# Patient Record
Sex: FEMALE | Race: WHITE | NOT HISPANIC OR LATINO | Employment: FULL TIME | URBAN - METROPOLITAN AREA
[De-identification: names, ages, dates, MRNs, and addresses within clinical notes are randomized per-mention and may not be internally consistent; named-entity substitution may affect disease eponyms.]

---

## 2017-04-17 ENCOUNTER — OFFICE VISIT (OUTPATIENT)
Dept: FAMILY MEDICINE | Facility: CLINIC | Age: 27
End: 2017-04-17
Payer: COMMERCIAL

## 2017-04-17 VITALS
TEMPERATURE: 98.1 F | RESPIRATION RATE: 16 BRPM | SYSTOLIC BLOOD PRESSURE: 121 MMHG | BODY MASS INDEX: 27.49 KG/M2 | HEIGHT: 64 IN | DIASTOLIC BLOOD PRESSURE: 83 MMHG | HEART RATE: 86 BPM | WEIGHT: 161 LBS

## 2017-04-17 DIAGNOSIS — Z23 NEED FOR TDAP VACCINATION: ICD-10-CM

## 2017-04-17 DIAGNOSIS — F41.9 ANXIETY: ICD-10-CM

## 2017-04-17 DIAGNOSIS — Z00.00 ROUTINE GENERAL MEDICAL EXAMINATION AT A HEALTH CARE FACILITY: Primary | ICD-10-CM

## 2017-04-17 DIAGNOSIS — G43.109 MIGRAINE WITH AURA AND WITHOUT STATUS MIGRAINOSUS, NOT INTRACTABLE: ICD-10-CM

## 2017-04-17 DIAGNOSIS — F32.0 MAJOR DEPRESSIVE DISORDER, SINGLE EPISODE, MILD (H): ICD-10-CM

## 2017-04-17 LAB
ANION GAP SERPL CALCULATED.3IONS-SCNC: 7 MMOL/L (ref 3–14)
BASOPHILS # BLD AUTO: 0 10E9/L (ref 0–0.2)
BASOPHILS NFR BLD AUTO: 0.1 %
BUN SERPL-MCNC: 10 MG/DL (ref 7–30)
CALCIUM SERPL-MCNC: 9.3 MG/DL (ref 8.5–10.1)
CHLORIDE SERPL-SCNC: 107 MMOL/L (ref 94–109)
CO2 SERPL-SCNC: 27 MMOL/L (ref 20–32)
CREAT SERPL-MCNC: 0.77 MG/DL (ref 0.52–1.04)
DIFFERENTIAL METHOD BLD: NORMAL
EOSINOPHIL # BLD AUTO: 0.1 10E9/L (ref 0–0.7)
EOSINOPHIL NFR BLD AUTO: 1.1 %
ERYTHROCYTE [DISTWIDTH] IN BLOOD BY AUTOMATED COUNT: 12.8 % (ref 10–15)
GFR SERPL CREATININE-BSD FRML MDRD: 89 ML/MIN/1.7M2
GLUCOSE SERPL-MCNC: 96 MG/DL (ref 70–99)
HCT VFR BLD AUTO: 39.2 % (ref 35–47)
HGB BLD-MCNC: 13.9 G/DL (ref 11.7–15.7)
LYMPHOCYTES # BLD AUTO: 1.7 10E9/L (ref 0.8–5.3)
LYMPHOCYTES NFR BLD AUTO: 20.7 %
MCH RBC QN AUTO: 30.8 PG (ref 26.5–33)
MCHC RBC AUTO-ENTMCNC: 35.5 G/DL (ref 31.5–36.5)
MCV RBC AUTO: 87 FL (ref 78–100)
MONOCYTES # BLD AUTO: 0.6 10E9/L (ref 0–1.3)
MONOCYTES NFR BLD AUTO: 6.6 %
NEUTROPHILS # BLD AUTO: 6 10E9/L (ref 1.6–8.3)
NEUTROPHILS NFR BLD AUTO: 71.5 %
PLATELET # BLD AUTO: 162 10E9/L (ref 150–450)
POTASSIUM SERPL-SCNC: 3.7 MMOL/L (ref 3.4–5.3)
RBC # BLD AUTO: 4.52 10E12/L (ref 3.8–5.2)
SODIUM SERPL-SCNC: 141 MMOL/L (ref 133–144)
WBC # BLD AUTO: 8.3 10E9/L (ref 4–11)

## 2017-04-17 PROCEDURE — 80048 BASIC METABOLIC PNL TOTAL CA: CPT | Performed by: FAMILY MEDICINE

## 2017-04-17 PROCEDURE — 90715 TDAP VACCINE 7 YRS/> IM: CPT | Performed by: FAMILY MEDICINE

## 2017-04-17 PROCEDURE — 99213 OFFICE O/P EST LOW 20 MIN: CPT | Mod: 25 | Performed by: FAMILY MEDICINE

## 2017-04-17 PROCEDURE — 36415 COLL VENOUS BLD VENIPUNCTURE: CPT | Performed by: FAMILY MEDICINE

## 2017-04-17 PROCEDURE — 90471 IMMUNIZATION ADMIN: CPT | Performed by: FAMILY MEDICINE

## 2017-04-17 PROCEDURE — 85025 COMPLETE CBC W/AUTO DIFF WBC: CPT | Performed by: FAMILY MEDICINE

## 2017-04-17 PROCEDURE — 99385 PREV VISIT NEW AGE 18-39: CPT | Mod: 25 | Performed by: FAMILY MEDICINE

## 2017-04-17 RX ORDER — SUMATRIPTAN 25 MG/1
25-50 TABLET, FILM COATED ORAL
Qty: 18 TABLET | Refills: 3 | Status: SHIPPED | OUTPATIENT
Start: 2017-04-17 | End: 2019-05-31

## 2017-04-17 RX ORDER — CITALOPRAM HYDROBROMIDE 20 MG/1
TABLET ORAL
Qty: 30 TABLET | Refills: 0 | Status: SHIPPED | OUTPATIENT
Start: 2017-04-17 | End: 2017-05-19

## 2017-04-17 ASSESSMENT — ANXIETY QUESTIONNAIRES
2. NOT BEING ABLE TO STOP OR CONTROL WORRYING: SEVERAL DAYS
7. FEELING AFRAID AS IF SOMETHING AWFUL MIGHT HAPPEN: SEVERAL DAYS
3. WORRYING TOO MUCH ABOUT DIFFERENT THINGS: SEVERAL DAYS
IF YOU CHECKED OFF ANY PROBLEMS ON THIS QUESTIONNAIRE, HOW DIFFICULT HAVE THESE PROBLEMS MADE IT FOR YOU TO DO YOUR WORK, TAKE CARE OF THINGS AT HOME, OR GET ALONG WITH OTHER PEOPLE: SOMEWHAT DIFFICULT
5. BEING SO RESTLESS THAT IT IS HARD TO SIT STILL: SEVERAL DAYS
GAD7 TOTAL SCORE: 9
6. BECOMING EASILY ANNOYED OR IRRITABLE: MORE THAN HALF THE DAYS
1. FEELING NERVOUS, ANXIOUS, OR ON EDGE: MORE THAN HALF THE DAYS

## 2017-04-17 ASSESSMENT — PATIENT HEALTH QUESTIONNAIRE - PHQ9: 5. POOR APPETITE OR OVEREATING: SEVERAL DAYS

## 2017-04-17 NOTE — PATIENT INSTRUCTIONS
Follow up in 1 month or sooner if needed    Preventive Health Recommendations  Female Ages 26 - 39  Yearly exam:   See your health care provider every year in order to    Review health changes.     Discuss preventive care.      Review your medicines if you your doctor has prescribed any.    Until age 30: Get a Pap test every three years (more often if you have had an abnormal result).    After age 30: Talk to your doctor about whether you should have a Pap test every 3 years or have a Pap test with HPV screening every 5 years.   You do not need a Pap test if your uterus was removed (hysterectomy) and you have not had cancer.  You should be tested each year for STDs (sexually transmitted diseases), if you're at risk.   Talk to your provider about how often to have your cholesterol checked.  If you are at risk for diabetes, you should have a diabetes test (fasting glucose).  Shots: Get a flu shot each year. Get a tetanus shot every 10 years.   Nutrition:     Eat at least 5 servings of fruits and vegetables each day.    Eat whole-grain bread, whole-wheat pasta and brown rice instead of white grains and rice.    Talk to your provider about Calcium and Vitamin D.     Lifestyle    Exercise at least 150 minutes a week (30 minutes a day, 5 days of the week). This will help you control your weight and prevent disease.    Limit alcohol to one drink per day.    No smoking.     Wear sunscreen to prevent skin cancer.    See your dentist every six months for an exam and cleaning.

## 2017-04-17 NOTE — NURSING NOTE
"Chief Complaint   Patient presents with     Physical     PE/PAP--pt is fasting this AM       Initial /83 (BP Location: Right arm, Patient Position: Chair, Cuff Size: Adult Regular)  Pulse 86  Temp 98.1  F (36.7  C) (Oral)  Resp 16  Ht 5' 4\" (1.626 m)  Wt 161 lb (73 kg)  LMP 04/05/2017  Breastfeeding? No  BMI 27.64 kg/m2 Estimated body mass index is 27.64 kg/(m^2) as calculated from the following:    Height as of this encounter: 5' 4\" (1.626 m).    Weight as of this encounter: 161 lb (73 kg).  Medication Reconciliation: complete     Ginna Polo/JUAN CARLOS  [place]---[facility]        "

## 2017-04-17 NOTE — LETTER
My Migraine Action Plan      Date: 4/17/2017     My Name: Christina Ornelas   YOB: 1990  My Pharmacy:    [facility] [address] [place] [facility] [address] [facility] [address] [place] [facility] [address]  [facility] [address] [place], [place] - [address]       My (Preventative) Control Medicine: N/A        My Rescue Medicine: imitrex   My Doctor: Jeannette Reddy     My Clinic: [address] [address] [phone]        GREEN ZONE = Good Control    My headache plan is working.   I can do what I need to do.           I WILL:     ? Keep managing my triggers.  ? Write in my migraine diary each time I have a headache.  ? Keep taking my preventive (controller) medicine daily.  ? Take my relief and rescue medicine as needed.             YELLOW ZONE = Not Enough Control    My headache plan isn t always working.   My headaches keep me from doing   some of the things I need to do.       I WILL:     ? Set goals to control my triggers and act on them.  ? Write in my migraine diary each time I have a headache and review it for                      patterns or new triggers.  ? Keep taking my preventive (controller) medicine daily.  ? Take my relief and rescue medicine as needed.  ? Call my doctor or clinic at if I stay in the Yellow Zone.             RED ZONE = Poor or No Control    My headache plan has  failed. I can t do anything  when I have one. My  medicines aren t working.           I WILL:   ? Set goals to control my triggers and act on them.  ? Write in my migraine diary each time I have a headache and review it for                      patterns or new triggers.  ? Keep taking my preventive (controller) medicine daily.  ? Take my relief and rescue medicine as needed.  ? Call my doctor or clinic or go to urgent care or an ER if I m having the worst                  headache of my  life.  ? Call my doctor or clinic or go to urgent care or an ER if my medicine doesn t work.  ? Let my doctor or clinic know within 2 weeks if I have gone to an urgent care or             emergency department.          Provider specific instructions:  ***

## 2017-04-17 NOTE — PROGRESS NOTES
SUBJECTIVE:     CC: Christina Ornelas is an 26 year old woman who presents for preventive health visit.     Healthy Habits:    Do you get at least three servings of calcium containing foods daily (dairy, green leafy vegetables, etc.)? yes    Amount of exercise or daily activities, outside of work: 0-1 day(s) per week    Problems taking medications regularly not applicable    Medication side effects: No    Have you had an eye exam in the past two years? yes    Do you see a dentist twice per year? yes    Do you have sleep apnea, excessive snoring or daytime drowsiness?no    Other:  IUD- 1 month ago.   Last pap- sometime last year and it was [facility].     Anxiety/depression  Per patient, used to take citalopram and lorazepam to help with her mood.   Has not needed it in a few years but feels like she needs to get back on it.   Currently helping take care of her boyfriend's kids and states that along with work is making her more anxious.   Has a history of ADHD and used to be on Adderall - last filled in 2014 and states she would like to get back on it as sometimes her boss wants her to get projects done very quickly.          Today's PHQ-2 Score:   PHQ-2 ( 1999 Pfizer) 3/3/2014 12/31/2012   Q1: Little interest or pleasure in doing things 0 0   Q2: Feeling down, depressed or hopeless 0 0   PHQ-2 Score 0 0       Abuse: Current or Past(Physical, Sexual or Emotional)- No  Do you feel safe in your environment - Yes    Social History   Substance Use Topics     Smoking status: Former Smoker     Types: Cigarettes     Quit date: 6/1/2011     Smokeless tobacco: Never Used     Alcohol use No      Comment: once A WEEK, 2-3 beers at times      The patient does not drink >3 drinks per day nor >7 drinks per week.    Recent Labs   Lab Test  11/14/11   1211  11/23/09   1204   CHOL  189  166   HDL  63  57   LDL  99  92   TRIG  136  84   CHOLHDLRATIO  3.0  2.9       Reviewed orders with patient.  Reviewed health  "maintenance and updated orders accordingly - Yes    Mammo Decision Support:  Mammogram not appropriate for this patient based on age.    Pertinent mammograms are reviewed under the imaging tab.  History of abnormal Pap smear: NO - age 21-29 PAP every 3 years recommended    Reviewed and updated as needed this visit by clinical staff  Tobacco  Allergies  Med Hx  Surg Hx  Fam Hx  Soc Hx        Reviewed and updated as needed this visit by Provider            ROS:  C: NEGATIVE for fever, chills, change in weight  I: NEGATIVE for worrisome rashes, moles or lesions  E: NEGATIVE for vision changes or irritation  ENT: NEGATIVE for ear, mouth and throat problems  R: NEGATIVE for significant cough or SOB  B: NEGATIVE for masses, tenderness or discharge  CV: NEGATIVE for chest pain, palpitations or peripheral edema  GI: NEGATIVE for nausea, abdominal pain, heartburn, or change in bowel habits  : NEGATIVE for unusual urinary or vaginal symptoms. Periods are regular.  M: NEGATIVE for significant arthralgias or myalgia  N: NEGATIVE for weakness, dizziness or paresthesias  PSYCHIATRIC: POSITIVE forHx anxiety and Hx depression    Problem list, Medication list, Allergies, and Medical/Social/Surgical histories reviewed in [facility] and updated as appropriate.  OBJECTIVE:     /83 (BP Location: Right arm, Patient Position: Chair, Cuff Size: Adult Regular)  Pulse 86  Temp 98.1  F (36.7  C) (Oral)  Resp 16  Ht 5' 4\" (1.626 m)  Wt 161 lb (73 kg)  LMP 04/05/2017  Breastfeeding? No  BMI 27.64 kg/m2  EXAM:  GENERAL: healthy, alert and no distress  EYES: Eyes grossly normal to inspection, PERRL and conjunctivae and sclerae normal  HENT: ear canals and TM's normal, nose and mouth without ulcers or lesions  RESP: lungs clear to auscultation - no rales, rhonchi or wheezes  CV: regular rate and rhythm, normal S1 S2, no S3 or S4, no murmur, click or rub, no peripheral edema and peripheral pulses strong  ABDOMEN: soft, nontender, no " "hepatosplenomegaly, no masses and bowel sounds normal   (female): deferred  MS: no gross musculoskeletal defects noted, no edema  NEURO: Normal strength and tone, mentation intact and speech normal  PSYCH: mentation appears normal, affect normal/bright    ASSESSMENT/PLAN:     1. Routine general medical examination at a health care facility  - up to date on pap.   - CBC with platelets differential  - Basic metabolic panel    2. Major depressive disorder, single episode, mild (H)  - will restart on celexa. Will start on 10 mg and titrate up. Patient to follow up in 1 month or sooner if needed. Discussed CBT but patient not ready for that at this time.   - citalopram (CELEXA) 20 MG tablet; Take 1/2 tablet (10 mg) for 1-2 weeks, then increase to 1 tablet orally daily  Dispense: 30 tablet; Refill: 0    3. Anxiety  - see above   - citalopram (CELEXA) 20 MG tablet; Take 1/2 tablet (10 mg) for 1-2 weeks, then increase to 1 tablet orally daily  Dispense: 30 tablet; Refill: 0    4. Migraine with aura and without status migrainosus, not intractable  - will refill imitrex   - SUMAtriptan (IMITREX) 25 MG tablet; Take 1-2 tablets (25-50 mg) by mouth at onset of headache for migraine May repeat dose in 2 hours.  Do not exceed 200 mg in 24 hours  Dispense: 18 tablet; Refill: 3    5. Need for Tdap vaccination  - TDAP VACCINE (ADACEL)    COUNSELING:   Reviewed preventive health counseling, as reflected in patient instructions       Regular exercise       Healthy diet/nutrition         reports that she quit smoking about 5 years ago. Her smoking use included Cigarettes. She has never used smokeless tobacco.    Estimated body mass index is 27.64 kg/(m^2) as calculated from the following:    Height as of this encounter: 5' 4\" (1.626 m).    Weight as of this encounter: 161 lb (73 kg).   Weight management plan: Discussed healthy diet and exercise guidelines and patient will follow up in 12 months in clinic to re-evaluate.    Counseling " Resources:  ATP IV Guidelines  Pooled Cohorts Equation Calculator  Breast Cancer Risk Calculator  FRAX Risk Assessment  ICSI Preventive Guidelines  Dietary Guidelines for Americans, 2010  USDA's MyPlate  ASA Prophylaxis  Lung CA Screening    Sheila Ruff MD  [facility]

## 2017-04-17 NOTE — MR AVS SNAPSHOT
After Visit Summary   4/17/2017    Christina Ornelas    MRN: [id number]           Patient Information     Date Of Birth          1990        Visit Information        Provider Department      4/17/2017 8:30 AM Sheila Ruff MD [facility]        Today's Diagnoses     Major depressive disorder, single episode, mild (H)    -  1    Routine general medical examination at a health care facility        Anxiety        Migraine with aura and without status migrainosus, not intractable          Care Instructions      Follow up in 1 month or sooner if needed    Preventive Health Recommendations  Female Ages 26 - 39  Yearly exam:   See your health care provider every year in order to    Review health changes.     Discuss preventive care.      Review your medicines if you your doctor has prescribed any.    Until age 30: Get a Pap test every three years (more often if you have had an abnormal result).    After age 30: Talk to your doctor about whether you should have a Pap test every 3 years or have a Pap test with HPV screening every 5 years.   You do not need a Pap test if your uterus was removed (hysterectomy) and you have not had cancer.  You should be tested each year for STDs (sexually transmitted diseases), if you're at risk.   Talk to your provider about how often to have your cholesterol checked.  If you are at risk for diabetes, you should have a diabetes test (fasting glucose).  Shots: Get a flu shot each year. Get a tetanus shot every 10 years.   Nutrition:     Eat at least 5 servings of fruits and vegetables each day.    Eat whole-grain bread, whole-wheat pasta and brown rice instead of white grains and rice.    Talk to your provider about Calcium and Vitamin D.     Lifestyle    Exercise at least 150 minutes a week (30 minutes a day, 5 days of the week). This will help you control your weight and prevent disease.    Limit alcohol to one drink per day.    No  "smoking.     Wear sunscreen to prevent skin cancer.    See your dentist every six months for an exam and cleaning.          Follow-ups after your visit        Who to contact     If you have questions or need follow up information about today's clinic visit or your schedule please contact [facility] directly at [phone].  Normal or non-critical lab and imaging results will be communicated to you by MyChart, letter or phone within 4 business days after the clinic has received the results. If you do not hear from us within 7 days, please contact the clinic through [employer] or phone. If you have a critical or abnormal lab result, we will notify you by phone as soon as possible.  Submit refill requests through [email or web address] or call your pharmacy and they will forward the refill request to us. Please allow 3 business days for your refill to be completed.          Additional Information About Your Visit        MyChart Information     [email or web address] lets you send messages to your doctor, view your test results, renew your prescriptions, schedule appointments and more. To sign up, go to [email or web address] . Click on \"Log in\" on the left side of the screen, which will take you to the Welcome page. Then click on \"Sign up Now\" on the right side of the page.     You will be asked to enter the access code listed below, as well as some personal information. Please follow the directions to create your username and password.     Your access code is: [email or web address]  Expires: 2017  9:08 AM     Your access code will  in 90 days. If you need help or a new code, please call your [facility] or [phone].        Care EveryWhere ID     This is your Care EveryWhere ID. This could be used by other organizations to access your [facility] medical records  [id number]        Your Vitals Were     Pulse Temperature Respirations Height Last Period Breastfeeding?    86 98.1  F (36.7  C) (Oral) 16 5' 4\" (1.626 m) " 04/05/2017 No    BMI (Body Mass Index)                   27.64 kg/m2            Blood Pressure from Last 3 Encounters:   04/17/17 121/83   03/03/14 108/70   03/16/13 120/80    Weight from Last 3 Encounters:   04/17/17 161 lb (73 kg)   03/03/14 152 lb (68.9 kg)   03/16/13 163 lb (73.9 kg)              We Performed the Following     Basic metabolic panel     CBC with platelets differential          Today's Medication Changes          These changes are accurate as of: 4/17/17  9:08 AM.  If you have any questions, ask your nurse or doctor.               Start taking these medicines.        Dose/Directions    citalopram 20 MG tablet   Commonly known as:  celeXA   Used for:  Major depressive disorder, single episode, mild (H), Anxiety   Started by:  Sheila Ruff MD        Take 1/2 tablet (10 mg) for 1-2 weeks, then increase to 1 tablet orally daily   Quantity:  30 tablet   Refills:  0            Where to get your medicines      These medications were sent to [facility] [address] [place] [address] 42  AT [address] [facility] [address]     Phone:  [phone]     citalopram 20 MG tablet    SUMAtriptan 25 MG tablet                Primary Care Provider Office Phone # Fax #    Jeannette Reddy [phone]       [address] [address]        Thank you!     Thank you for choosing [facility]  for your care. Our goal is always to provide you with excellent care. Hearing back from our patients is one way we can continue to improve our services. Please take a few minutes to complete the written survey that you may receive in the mail after your visit with us. Thank you!             Your Updated Medication List - Protect others around you: Learn how to safely use, store and throw away your medicines at www.disposemymeds.org.          This list is accurate as of: 4/17/17  9:08 AM.  Always  use your most recent med list.                   Brand Name Dispense Instructions for use    amphetamine-dextroamphetamine 20 MG per tablet    ADDERALL    31 tablet    Take 1 tablet (20 mg) by mouth daily (with breakfast)       citalopram 20 MG tablet    celeXA    30 tablet    Take 1/2 tablet (10 mg) for 1-2 weeks, then increase to 1 tablet orally daily       levonorgestrel 20 MCG/24HR IUD    MIRENA    1 each    1 each (20 mcg) by Intrauterine route once for 1 dose       SUMAtriptan 25 MG tablet    IMITREX    18 tablet    Take 1-2 tablets (25-50 mg) by mouth at onset of headache for migraine May repeat dose in 2 hours.  Do not exceed 200 mg in 24 hours

## 2017-04-17 NOTE — LETTER
[facility]  [address] [place], [address]  [phone]      April 18, 2017    Christina Ornelas  [address] [facility] [address] [address]          Dear Christina,    The results of your recent tests were normal.  Enclosed is a copy of the results.  It was a pleasure to see you at your last appointment.  Results for orders placed or performed in visit on 04/17/17   CBC with platelets differential   Result Value Ref Range    WBC 8.3 4.0 - 11.0 10e9/L    RBC Count 4.52 3.8 - 5.2 10e12/L    Hemoglobin 13.9 11.7 - 15.7 g/dL    Hematocrit 39.2 35.0 - 47.0 %    MCV 87 78 - 100 fl    MCH 30.8 26.5 - 33.0 pg    MCHC 35.5 31.5 - 36.5 g/dL    RDW 12.8 10.0 - 15.0 %    Platelet Count 162 150 - 450 10e9/L    Diff Method Automated Method     % Neutrophils 71.5 %    % Lymphocytes 20.7 %    % Monocytes 6.6 %    % Eosinophils 1.1 %    % Basophils 0.1 %    Absolute Neutrophil 6.0 1.6 - 8.3 10e9/L    Absolute Lymphocytes 1.7 0.8 - 5.3 10e9/L    Absolute Monocytes 0.6 0.0 - 1.3 10e9/L    Absolute Eosinophils 0.1 0.0 - 0.7 10e9/L    Absolute Basophils 0.0 0.0 - 0.2 10e9/L   Basic metabolic panel   Result Value Ref Range    Sodium 141 133 - 144 mmol/L    Potassium 3.7 3.4 - 5.3 mmol/L    Chloride 107 94 - 109 mmol/L    Carbon Dioxide 27 20 - 32 mmol/L    Anion Gap 7 3 - 14 mmol/L    Glucose 96 70 - 99 mg/dL    Urea Nitrogen 10 7 - 30 mg/dL    Creatinine 0.77 0.52 - 1.04 mg/dL    GFR Estimate 89 >60 mL/min/1.7m2    GFR Estimate If Black >90   GFR Calc   >60 mL/min/1.7m2    Calcium 9.3 8.5 - 10.1 mg/dL       If you have any questions or concerns, please call myself or my nurse at [phone].          Sincerely,    Sheila Ruff MD  [id number]

## 2017-04-17 NOTE — Clinical Note
Please abstract the following data from this visit with this patient into the appropriate field in Epic:  Pap smear done on this date: 2016 (approximately), by this group: [facility], results were wnl.

## 2017-04-18 ASSESSMENT — PATIENT HEALTH QUESTIONNAIRE - PHQ9: SUM OF ALL RESPONSES TO PHQ QUESTIONS 1-9: 3

## 2017-04-18 ASSESSMENT — ANXIETY QUESTIONNAIRES: GAD7 TOTAL SCORE: 9

## 2017-04-18 NOTE — PROGRESS NOTES
Please send patient a letter to let them know results are normal.    Labs from your recent visit are all normal.   For further questions or concerns please let us know.     Sincerely,    Dr. Driver

## 2017-05-19 ENCOUNTER — OFFICE VISIT (OUTPATIENT)
Dept: FAMILY MEDICINE | Facility: CLINIC | Age: 27
End: 2017-05-19
Payer: COMMERCIAL

## 2017-05-19 VITALS
DIASTOLIC BLOOD PRESSURE: 74 MMHG | SYSTOLIC BLOOD PRESSURE: 122 MMHG | HEIGHT: 64 IN | RESPIRATION RATE: 20 BRPM | HEART RATE: 72 BPM | TEMPERATURE: 98.3 F | WEIGHT: 160 LBS | BODY MASS INDEX: 27.31 KG/M2

## 2017-05-19 DIAGNOSIS — F41.9 ANXIETY: ICD-10-CM

## 2017-05-19 DIAGNOSIS — F32.0 MAJOR DEPRESSIVE DISORDER, SINGLE EPISODE, MILD (H): ICD-10-CM

## 2017-05-19 PROCEDURE — 99213 OFFICE O/P EST LOW 20 MIN: CPT | Performed by: FAMILY MEDICINE

## 2017-05-19 RX ORDER — CITALOPRAM HYDROBROMIDE 20 MG/1
TABLET ORAL
Qty: 30 TABLET | Refills: 3 | Status: SHIPPED | OUTPATIENT
Start: 2017-05-19 | End: 2018-05-11

## 2017-05-19 ASSESSMENT — ANXIETY QUESTIONNAIRES
GAD7 TOTAL SCORE: 3
1. FEELING NERVOUS, ANXIOUS, OR ON EDGE: SEVERAL DAYS
2. NOT BEING ABLE TO STOP OR CONTROL WORRYING: NOT AT ALL
5. BEING SO RESTLESS THAT IT IS HARD TO SIT STILL: NOT AT ALL
IF YOU CHECKED OFF ANY PROBLEMS ON THIS QUESTIONNAIRE, HOW DIFFICULT HAVE THESE PROBLEMS MADE IT FOR YOU TO DO YOUR WORK, TAKE CARE OF THINGS AT HOME, OR GET ALONG WITH OTHER PEOPLE: SOMEWHAT DIFFICULT
7. FEELING AFRAID AS IF SOMETHING AWFUL MIGHT HAPPEN: NOT AT ALL
6. BECOMING EASILY ANNOYED OR IRRITABLE: SEVERAL DAYS
3. WORRYING TOO MUCH ABOUT DIFFERENT THINGS: SEVERAL DAYS

## 2017-05-19 ASSESSMENT — PATIENT HEALTH QUESTIONNAIRE - PHQ9: 5. POOR APPETITE OR OVEREATING: NOT AT ALL

## 2017-05-19 NOTE — NURSING NOTE
"Chief Complaint   Patient presents with     Depression     f/u meds, depression/anxiety       Initial /74 (BP Location: Right arm, Patient Position: Chair, Cuff Size: Adult Regular)  Pulse 72  Temp 98.3  F (36.8  C) (Oral)  Resp 20  Ht 5' 4\" (1.626 m)  Wt 160 lb (72.6 kg)  Breastfeeding? No  BMI 27.46 kg/m2 Estimated body mass index is 27.46 kg/(m^2) as calculated from the following:    Height as of this encounter: 5' 4\" (1.626 m).    Weight as of this encounter: 160 lb (72.6 kg).  Medication Reconciliation: complete     Ginna Polo/JUAN CARLOS  [place]---[facility]      "

## 2017-05-19 NOTE — MR AVS SNAPSHOT
"              After Visit Summary   2017    Christina Ornelas    MRN: [id number]           Patient Information     Date Of Birth          1990        Visit Information        Provider Department      2017 10:00 AM Sheila Ruff MD [facility]        Today's Diagnoses     Major depressive disorder, single episode, mild (H)        Anxiety          Care Instructions    Follow up in 3 months or sooner if needed        Follow-ups after your visit        Who to contact     If you have questions or need follow up information about today's clinic visit or your schedule please contact [facility] directly at [phone].  Normal or non-critical lab and imaging results will be communicated to you by MyChart, letter or phone within 4 business days after the clinic has received the results. If you do not hear from us within 7 days, please contact the clinic through MyChart or phone. If you have a critical or abnormal lab result, we will notify you by phone as soon as possible.  Submit refill requests through [employer] or call your pharmacy and they will forward the refill request to us. Please allow 3 business days for your refill to be completed.          Additional Information About Your Visit        MyChart Information     [employer] lets you send messages to your doctor, view your test results, renew your prescriptions, schedule appointments and more. To sign up, go to [email or web address] . Click on \"Log in\" on the left side of the screen, which will take you to the Welcome page. Then click on \"Sign up Now\" on the right side of the page.     You will be asked to enter the access code listed below, as well as some personal information. Please follow the directions to create your username and password.     Your access code is: [email or web address]  Expires: 2017  9:08 AM     Your access code will  in 90 days. If you need help or a new code, please call " "your [facility] or [phone].        Care EveryWhere ID     This is your Care EveryWhere ID. This could be used by other organizations to access your [place] medical records  [id number]        Your Vitals Were     Pulse Temperature Respirations Height Breastfeeding? BMI (Body Mass Index)    72 98.3  F (36.8  C) (Oral) 20 5' 4\" (1.626 m) No 27.46 kg/m2       Blood Pressure from Last 3 Encounters:   05/19/17 122/74   04/17/17 121/83   03/03/14 108/70    Weight from Last 3 Encounters:   05/19/17 160 lb (72.6 kg)   04/17/17 161 lb (73 kg)   03/03/14 152 lb (68.9 kg)              Today, you had the following     No orders found for display         Today's Medication Changes          These changes are accurate as of: 5/19/17 10:16 AM.  If you have any questions, ask your nurse or doctor.               These medicines have changed or have updated prescriptions.        Dose/Directions    citalopram 20 MG tablet   Commonly known as:  celeXA   This may have changed:  additional instructions   Used for:  Major depressive disorder, single episode, mild (H), Anxiety   Changed by:  Sheila Ruff MD        1 tablet orally daily   Quantity:  30 tablet   Refills:  3            Where to get your medicines      These medications were sent to [facility] [address] [place] [address] [address] [facility] [address]     Phone:  [phone]     citalopram 20 MG tablet                Primary Care Provider Office Phone # Fax #    Sheila Ruff [phone]       [facility] [address]  [facility] [address]        Thank you!     Thank you for choosing [facility]  for your care. Our goal is always to provide you with excellent care. Hearing back from our patients is one way we can continue to improve our services. Please take a few minutes to complete the written survey that " you may receive in the mail after your visit with us. Thank you!             Your Updated Medication List - Protect others around you: Learn how to safely use, store and throw away your medicines at www.disposemymeds.org.          This list is accurate as of: 5/19/17 10:16 AM.  Always use your most recent med list.                   Brand Name Dispense Instructions for use    citalopram 20 MG tablet    celeXA    30 tablet    1 tablet orally daily       levonorgestrel 20 MCG/24HR IUD    MIRENA    1 each    1 each (20 mcg) by Intrauterine route once for 1 dose       SUMAtriptan 25 MG tablet    IMITREX    18 tablet    Take 1-2 tablets (25-50 mg) by mouth at onset of headache for migraine May repeat dose in 2 hours.  Do not exceed 200 mg in 24 hours

## 2017-05-19 NOTE — PROGRESS NOTES
SUBJECTIVE:                                                    Christina Ornelas is a 26 year old female who presents to clinic today for the following health issues:        Depression and Anxiety Follow-Up    Status since last visit: going well     Other associated symptoms:None    Complicating factors:     Significant life event: No     Current substance abuse: None    PHQ-9 SCORE 11/23/2009 11/14/2011 4/17/2017   Total Score 1 0 -   Total Score - - 3     ITALO-7 SCORE 4/17/2017   Total Score 9        PHQ-9  English      PHQ-9   Any Language     GAD7       Patient states med is going well. Denies any side effects. Would like to continue with this medication.     Problem list and histories reviewed & adjusted, as indicated.  Additional history: as documented    Patient Active Problem List   Diagnosis     LGSIL on Pap smear     Temporomandibular joint disorder     Attention deficit disorder     CARDIOVASCULAR SCREENING; LDL GOAL LESS THAN 160     Contraception     Dysmenorrhea     Past Surgical History:   Procedure Laterality Date     HC TOOTH EXTRACTION W/FORCEP      wisdom teeth      SURGICAL HISTORY OF -   2003    rt elbow surgery x2 for fracture       Social History   Substance Use Topics     Smoking status: Former Smoker     Types: Cigarettes     Quit date: 6/1/2011     Smokeless tobacco: Never Used     Alcohol use No      Comment: once A WEEK, 2-3 beers at times      Family History   Problem Relation Age of Onset     Hypertension Mother      Lipids Mother      Depression Mother      Hypertension Father      CANCER Maternal Grandmother      ovarian cancer, dxed in her 60's -70's ?     Hypertension Maternal Grandmother      Arthritis Maternal Grandmother      DIABETES Maternal Grandfather      Hypertension Maternal Grandfather      HEART DISEASE Maternal Grandfather      not sure about details     Breast Cancer No family hx of      Cancer - colorectal No family hx of            Reviewed and updated as needed  "this visit by clinical staff  Tobacco  Allergies  Meds       Reviewed and updated as needed this visit by Provider         ROS:  Constitutional, psych systems are negative, except as otherwise noted.    OBJECTIVE:                                                    /74 (BP Location: Right arm, Patient Position: Chair, Cuff Size: Adult Regular)  Pulse 72  Temp 98.3  F (36.8  C) (Oral)  Resp 20  Ht 5' 4\" (1.626 m)  Wt 160 lb (72.6 kg)  Breastfeeding? No  BMI 27.46 kg/m2  Body mass index is 27.46 kg/(m^2).  GENERAL: healthy, alert and no distress  RESP: lungs clear to auscultation - no rales, rhonchi or wheezes  CV: regular rate and rhythm, normal S1 S2, no S3 or S4, no murmur, click or rub, no peripheral edema and peripheral pulses strong  PSYCH: mentation appears normal, affect normal/bright    Diagnostic Test Results:  none      ASSESSMENT/PLAN:                                                        1. Major depressive disorder, single episode, mild (H)  - will continue with celexa at 20 mg and follow up in 3 months. Patient does not want to start counseling at this time.   - citalopram (CELEXA) 20 MG tablet; 1 tablet orally daily  Dispense: 30 tablet; Refill: 3    2. Anxiety  - see above   - citalopram (CELEXA) 20 MG tablet; 1 tablet orally daily  Dispense: 30 tablet; Refill: 3    See Patient Instructions    Sheila Ruff MD  [facility]    "

## 2017-05-20 ASSESSMENT — ANXIETY QUESTIONNAIRES: GAD7 TOTAL SCORE: 3

## 2017-05-20 ASSESSMENT — PATIENT HEALTH QUESTIONNAIRE - PHQ9: SUM OF ALL RESPONSES TO PHQ QUESTIONS 1-9: 3

## 2017-06-07 ENCOUNTER — TELEPHONE (OUTPATIENT)
Dept: FAMILY MEDICINE | Facility: CLINIC | Age: 27
End: 2017-06-07

## 2017-06-07 NOTE — TELEPHONE ENCOUNTER
Panel Management Review      Patient has the following on her problem list:     Depression / Dysthymia review  PHQ-9 SCORE 11/14/2011 4/17/2017 5/19/2017   Total Score 0 - -   Total Score - 3 3      Patient is due for:  None      Composite cancer screening  Chart review shows that this patient is due/due soon for the following None  Summary:    Patient is due/failing the following:   Problem List- needed Depression and Anxiety Added    Action needed:   No patient contact needed, chart updated.    Type of outreach:    See above    Questions for provider review:    None                                                                                                                                    Marilou Laughlin CMA       Chart Closed

## 2018-01-05 DIAGNOSIS — F41.9 ANXIETY: ICD-10-CM

## 2018-01-05 DIAGNOSIS — F32.0 MAJOR DEPRESSIVE DISORDER, SINGLE EPISODE, MILD (H): ICD-10-CM

## 2018-01-05 NOTE — TELEPHONE ENCOUNTER
Requested Prescriptions   Pending Prescriptions Disp Refills     citalopram (CELEXA) 20 MG tablet [Pharmacy Med Name: CITALOPRAM 20MG TABLETS]  Last Written Prescription Date: 5/19/17  Last Fill Quantity: 30,  # refills: 3   Last Office Visit with [facility], [facility] or [facility] prescribing provider:  Delma 5/19/17   Future Office Visit:    30 tablet 0     Sig: TAKE 1 TABLET BY MOUTH DAILY    SSRIs Protocol Failed    1/5/2018 11:25 AM   .    Failed - PHQ-9 score less than 5 in past 6 months    The PHQ-9 criteria is meant to fail. It requires a PHQ-9 score review         Failed - Recent (6 mo) or future visit with authorizing provider's specialty    Patient had office visit in the last 6 months or has a visit in the next 30 days with authorizing provider.  See chart review.            Passed - Patient is age 18 or older       Passed - No active pregnancy on record       Passed - No positive pregnancy test in last 12 months

## 2018-01-05 NOTE — LETTER
[facility]  [address] [place], [address]  [phone]        January 11, 2018    Christina Ornelas                                                                                                                           [address]  [facility] [address]            We recently received a fax from your pharmacy requesting a refill of Citalopram.  I tried to contact you by phone but have not heard back from you.  Your refill has been declined until we see you for a med check.  Please call the clinic at [phone] to schedule your appointment.     Taking care of your health is important to us and ongoing visits with your provider are vital to your care.  We look forward to seeing you in the near future.     Sincerely,     [facility]

## 2018-01-08 RX ORDER — CITALOPRAM HYDROBROMIDE 20 MG/1
TABLET ORAL
Qty: 14 TABLET | Refills: 0 | OUTPATIENT
Start: 2018-01-08

## 2018-01-08 NOTE — TELEPHONE ENCOUNTER
"Dr. Spear-3 month visit was due 8/19/17.  4 month RX 5/19/17 so not taking as ordered.  Sent to provider.  Please advise.  Natasha Mitchell RN    5/19/17  ASSESSMENT/PLAN:            1. Major depressive disorder, single episode, mild (H)  - will continue with celexa at 20 mg and follow up in 3 months. Patient does not want to start counseling at this time.   - citalopram (CELEXA) 20 MG tablet; 1 tablet orally daily  Dispense: 30 tablet; Refill: 3     2. Anxiety  - see above   - citalopram (CELEXA) 20 MG tablet; 1 tablet orally daily  Dispense: 30 tablet; Refill: 3     See Patient Instructions     Sheila Ruff MD  [facility]         Nursing Note   Ginna Polo CMA (Medical Assistant)           Chief Complaint   Patient presents with     Depression       f/u meds, depression/anxiety         Initial /74 (BP Location: Right arm, Patient Position: Chair, Cuff Size: Adult Regular)  Pulse 72  Temp 98.3  F (36.8  C) (Oral)  Resp 20  Ht 5' 4\" (1.626 m)  Wt 160 lb (72.6 kg)  Breastfeeding? No  BMI 27.46 kg/m2 Estimated body mass index is 27.46 kg/(m^2) as calculated from the following:    Height as of this encounter: 5' 4\" (1.626 m).    Weight as of this encounter: 160 lb (72.6 kg).  Medication Reconciliation: complete      Ginna Polo/JUAN CARLOS  [place]---[facility]            Instructions   Follow up in 3 months or sooner if needed          "

## 2018-01-09 NOTE — TELEPHONE ENCOUNTER
Will call patient after 8 am.  Natasha Mitchell RN    Refused by: Travis Spear MD  Refusal reason: Patient needs appointment

## 2018-01-17 NOTE — TELEPHONE ENCOUNTER
Christina Ornelas is a 27 year old female returning call.  Informed.    We asked if she has been taking daily?  She replied yes, last Rx filled one month ago.  Asks why we would stop it now? States she was not told needs follow up.   We reviewed Dr. OROZCO 5/19/17 visit note with the patient:   1. Major depressive disorder, single episode, mild (H)  - will continue with celexa at 20 mg and follow up in 3 months. Patient does not want to start counseling at this time.   - citalopram (CELEXA) 20 MG tablet; 1 tablet orally daily  Dispense: 30 tablet; Refill: 3  We offered to schedule an appointment.  Declined.  She asks for telephone visit.  Informed OV recommended.   Pt will consider her options.     After the call we called [facility] pharmacist.   Last dispensed #30 on 11/7/17   The previous dispense was in August , pharm yan appear pt taking sporadically.   Reid Cintron, RN

## 2018-05-11 ENCOUNTER — OFFICE VISIT (OUTPATIENT)
Dept: FAMILY MEDICINE | Facility: CLINIC | Age: 28
End: 2018-05-11
Payer: COMMERCIAL

## 2018-05-11 VITALS
HEIGHT: 64 IN | BODY MASS INDEX: 30.13 KG/M2 | TEMPERATURE: 98.2 F | SYSTOLIC BLOOD PRESSURE: 119 MMHG | WEIGHT: 176.5 LBS | HEART RATE: 71 BPM | OXYGEN SATURATION: 97 % | DIASTOLIC BLOOD PRESSURE: 80 MMHG

## 2018-05-11 DIAGNOSIS — F41.9 ANXIETY: ICD-10-CM

## 2018-05-11 DIAGNOSIS — G47.00 PERSISTENT INSOMNIA: ICD-10-CM

## 2018-05-11 DIAGNOSIS — Z00.00 ROUTINE GENERAL MEDICAL EXAMINATION AT A HEALTH CARE FACILITY: Primary | ICD-10-CM

## 2018-05-11 DIAGNOSIS — L70.9 ACNE, UNSPECIFIED ACNE TYPE: ICD-10-CM

## 2018-05-11 DIAGNOSIS — E66.09 CLASS 1 OBESITY DUE TO EXCESS CALORIES WITHOUT SERIOUS COMORBIDITY WITH BODY MASS INDEX (BMI) OF 31.0 TO 31.9 IN ADULT: ICD-10-CM

## 2018-05-11 DIAGNOSIS — E66.811 CLASS 1 OBESITY DUE TO EXCESS CALORIES WITHOUT SERIOUS COMORBIDITY WITH BODY MASS INDEX (BMI) OF 31.0 TO 31.9 IN ADULT: ICD-10-CM

## 2018-05-11 DIAGNOSIS — F32.0 MAJOR DEPRESSIVE DISORDER, SINGLE EPISODE, MILD (H): ICD-10-CM

## 2018-05-11 PROCEDURE — 99395 PREV VISIT EST AGE 18-39: CPT | Performed by: PHYSICIAN ASSISTANT

## 2018-05-11 PROCEDURE — G0145 SCR C/V CYTO,THINLAYER,RESCR: HCPCS | Performed by: PHYSICIAN ASSISTANT

## 2018-05-11 PROCEDURE — 99213 OFFICE O/P EST LOW 20 MIN: CPT | Mod: 25 | Performed by: PHYSICIAN ASSISTANT

## 2018-05-11 RX ORDER — CLINDAMYCIN PHOSPHATE 10 UG/ML
LOTION TOPICAL 2 TIMES DAILY
Qty: 60 ML | Refills: 1 | Status: ON HOLD | OUTPATIENT
Start: 2018-05-11 | End: 2020-11-13

## 2018-05-11 RX ORDER — PHENTERMINE HYDROCHLORIDE 30 MG/1
30 CAPSULE ORAL EVERY MORNING
Qty: 30 CAPSULE | Refills: 1 | Status: SHIPPED | OUTPATIENT
Start: 2018-05-11 | End: 2018-07-11

## 2018-05-11 RX ORDER — CITALOPRAM HYDROBROMIDE 20 MG/1
TABLET ORAL
Qty: 90 TABLET | Refills: 3 | Status: SHIPPED | OUTPATIENT
Start: 2018-05-11 | End: 2018-09-28

## 2018-05-11 RX ORDER — TRAZODONE HYDROCHLORIDE 50 MG/1
50-100 TABLET, FILM COATED ORAL
Qty: 60 TABLET | Refills: 3 | Status: SHIPPED | OUTPATIENT
Start: 2018-05-11 | End: 2018-09-28

## 2018-05-11 NOTE — PATIENT INSTRUCTIONS
(Z00.00) Routine general medical examination at a health care facility  (primary encounter diagnosis)  Comment:   Plan:     (F32.0) Major depressive disorder, single episode, mild (H)  Comment:   Plan: citalopram (CELEXA) 20 MG tablet            (F41.9) Anxiety  Comment:   Plan: citalopram (CELEXA) 20 MG tablet, OFFICE/OUTPT         VISIT,EST,LEVL III            (G47.00) Persistent insomnia  Comment:   Plan: traZODone (DESYREL) 50 MG tablet, OFFICE/OUTPT         VISIT,EST,LEVL III            (L70.9) Acne, unspecified acne type  Comment:   Plan: clindamycin (CLEOCIN-T) 1 % lotion,         OFFICE/OUTPT VISIT,EST,LEVL III                        Preventive Health Recommendations  Female Ages 26 - 39  Yearly exam:   See your health care provider every year in order to    Review health changes.     Discuss preventive care.      Review your medicines if you your doctor has prescribed any.    Until age 30: Get a Pap test every three years (more often if you have had an abnormal result).    After age 30: Talk to your doctor about whether you should have a Pap test every 3 years or have a Pap test with HPV screening every 5 years.   You do not need a Pap test if your uterus was removed (hysterectomy) and you have not had cancer.  You should be tested each year for STDs (sexually transmitted diseases), if you're at risk.   Talk to your provider about how often to have your cholesterol checked.  If you are at risk for diabetes, you should have a diabetes test (fasting glucose).  Shots: Get a flu shot each year. Get a tetanus shot every 10 years.   Nutrition:     Eat at least 5 servings of fruits and vegetables each day.    Eat whole-grain bread, whole-wheat pasta and brown rice instead of white grains and rice.    Talk to your provider about Calcium and Vitamin D.     Lifestyle    Exercise at least 150 minutes a week (30 minutes a day, 5 days of the week). This will help you control your weight and prevent disease.    Limit  alcohol to one drink per day.    No smoking.     Wear sunscreen to prevent skin cancer.    See your dentist every six months for an exam and cleaning.

## 2018-05-11 NOTE — LETTER
May 17, 2018      Christina ELIA Johnson  [address]  [place] [address]    Dear ,      I am happy to inform you that your recent cervical cancer screening test (PAP smear) was normal.      Preventative screenings such as this help to ensure your health for years to come. You should repeat a pap smear in 3 years, unless otherwise directed.      You will still need to return to the clinic every year for your annual exam and other preventive tests.     Please contact the clinic at [phone] if you have further questions.       Sincerely,      Ramona Ann Aaseby-Aguilera, PA-C/dima

## 2018-05-11 NOTE — MR AVS SNAPSHOT
After Visit Summary   5/11/2018    Christina Ornelas    MRN: [id number]           Patient Information     Date Of Birth          1990        Visit Information        Provider Department      5/11/2018 2:30 PM Aaseby-Aguilera, Ramona Ann, PA-C [facility]        Today's Diagnoses     Routine general medical examination at a health care facility    -  1    Major depressive disorder, single episode, mild (H)        Anxiety        Persistent insomnia        Acne, unspecified acne type        Class 1 obesity due to excess calories without serious comorbidity with body mass index (BMI) of 31.0 to 31.9 in adult          Care Instructions      (Z00.00) Routine general medical examination at a health care facility  (primary encounter diagnosis)  Comment:   Plan:     (F32.0) Major depressive disorder, single episode, mild (H)  Comment:   Plan: citalopram (CELEXA) 20 MG tablet            (F41.9) Anxiety  Comment:   Plan: citalopram (CELEXA) 20 MG tablet, OFFICE/OUTPT         VISIT,EST,LEVL III            (G47.00) Persistent insomnia  Comment:   Plan: traZODone (DESYREL) 50 MG tablet, OFFICE/OUTPT         VISIT,EST,LEVL III            (L70.9) Acne, unspecified acne type  Comment:   Plan: clindamycin (CLEOCIN-T) 1 % lotion,         OFFICE/OUTPT VISIT,EST,LEVL III                        Preventive Health Recommendations  Female Ages 26 - 39  Yearly exam:   See your health care provider every year in order to    Review health changes.     Discuss preventive care.      Review your medicines if you your doctor has prescribed any.    Until age 30: Get a Pap test every three years (more often if you have had an abnormal result).    After age 30: Talk to your doctor about whether you should have a Pap test every 3 years or have a Pap test with HPV screening every 5 years.   You do not need a Pap test if your uterus was removed (hysterectomy) and you have not had cancer.  You should be tested each  "year for STDs (sexually transmitted diseases), if you're at risk.   Talk to your provider about how often to have your cholesterol checked.  If you are at risk for diabetes, you should have a diabetes test (fasting glucose).  Shots: Get a flu shot each year. Get a tetanus shot every 10 years.   Nutrition:     Eat at least 5 servings of fruits and vegetables each day.    Eat whole-grain bread, whole-wheat pasta and brown rice instead of white grains and rice.    Talk to your provider about Calcium and Vitamin D.     Lifestyle    Exercise at least 150 minutes a week (30 minutes a day, 5 days of the week). This will help you control your weight and prevent disease.    Limit alcohol to one drink per day.    No smoking.     Wear sunscreen to prevent skin cancer.    See your dentist every six months for an exam and cleaning.            Follow-ups after your visit        Who to contact     If you have questions or need follow up information about today's clinic visit or your schedule please contact [facility] directly at [phone].  Normal or non-critical lab and imaging results will be communicated to you by MyChart, letter or phone within 4 business days after the clinic has received the results. If you do not hear from us within 7 days, please contact the clinic through Taggoh[employer] or phone. If you have a critical or abnormal lab result, we will notify you by phone as soon as possible.  Submit refill requests through Weebly or call your pharmacy and they will forward the refill request to us. Please allow 3 business days for your refill to be completed.          Additional Information About Your Visit        Weebly Information     Weebly lets you send messages to your doctor, view your test results, renew your prescriptions, schedule appointments and more. To sign up, go to [email or web address] . Click on \"Log in\" on the left side of the screen, which will take you to the Welcome page. Then click " "on \"Sign up Now\" on the right side of the page.     You will be asked to enter the access code listed below, as well as some personal information. Please follow the directions to create your username and password.     Your access code is: [email or web address]  Expires: 2018  3:09 PM     Your access code will  in 90 days. If you need help or a new code, please call your [place] clinic or [phone].        Care EveryWhere ID     This is your Care EveryWhere ID. This could be used by other organizations to access your [place] medical records  [id number]        Your Vitals Were     Pulse Temperature Height Pulse Oximetry BMI (Body Mass Index)       71 98.2  F (36.8  C) (Oral) 5' 4\" (1.626 m) 97% 30.3 kg/m2        Blood Pressure from Last 3 Encounters:   18 119/80   17 122/74   17 121/83    Weight from Last 3 Encounters:   18 176 lb 8 oz (80.1 kg)   17 160 lb (72.6 kg)   17 161 lb (73 kg)              We Performed the Following     OFFICE/OUTPT VISIT,EST,LEVL III          Today's Medication Changes          These changes are accurate as of 18  3:09 PM.  If you have any questions, ask your nurse or doctor.               Start taking these medicines.        Dose/Directions    clindamycin 1 % lotion   Commonly known as:  CLEOCIN-T   Used for:  Acne, unspecified acne type   Started by:  Aaseby-Aguilera, Ramona Ann, PA-C        Apply topically 2 times daily   Quantity:  60 mL   Refills:  1       phentermine 30 MG capsule   Used for:  Class 1 obesity due to excess calories without serious comorbidity with body mass index (BMI) of 31.0 to 31.9 in adult   Started by:  Aaseby-Aguilera, Ramona Ann, PA-C        Dose:  30 mg   Take 1 capsule (30 mg) by mouth every morning   Quantity:  30 capsule   Refills:  1       traZODone 50 MG tablet   Commonly known as:  DESYREL   Used for:  Persistent insomnia   Started by:  Aaseby-Aguilera, Ramona Ann, PA-C        Dose:   mg   Take 1-2 " tablets ( mg) by mouth nightly as needed for sleep   Quantity:  60 tablet   Refills:  3            Where to get your medicines      These medications were sent to [facility] [id number] [facility] [address]  [address] [facility] [address] [facility] [facility] [address]     Phone:  [phone]     citalopram 20 MG tablet    clindamycin 1 % lotion    traZODone 50 MG tablet         Some of these will need a paper prescription and others can be bought over the counter.  Ask your nurse if you have questions.     Bring a paper prescription for each of these medications     phentermine 30 MG capsule                Primary Care Provider Office Phone # Fax #    Ramona Ann Aaseby-Aguilera, PA-C [phone] [id number]  [facility] [address]        Equal Access to Services     CARMELINA CARPENTER : Hadii raina ocasio hadasho Soomaali, waaxda luqadaha, qaybta kaalmada adeegyada, waxay dannielle hayhola gipson . So [facility] [phone].    ATENCIÓN: Si habla español, tiene a fox disposición servicios gratuitos de asistencia lingüística. Uriel al [phone].    We comply with applicable federal civil rights laws and [place] laws. We do not discriminate on the basis of race, color, national origin, age, disability, sex, sexual orientation, or gender identity.            Thank you!     Thank you for choosing [facility]  for your care. Our goal is always to provide you with excellent care. Hearing back from our patients is one way we can continue to improve our services. Please take a few minutes to complete the written survey that you may receive in the mail after your visit with us. Thank you!             Your Updated Medication List - Protect others around you: Learn how to safely use, store and throw away your medicines at www.disposemymeds.org.          This list is accurate as of 5/11/18  3:09 PM.  Always use your most recent med list.                    Brand Name Dispense Instructions for use Diagnosis    citalopram 20 MG tablet    celeXA    90 tablet    1 tablet orally daily    Major depressive disorder, single episode, mild (H), Anxiety       clindamycin 1 % lotion    CLEOCIN-T    60 mL    Apply topically 2 times daily    Acne, unspecified acne type       levonorgestrel 20 MCG/24HR IUD    MIRENA    1 each    1 each (20 mcg) by Intrauterine route once for 1 dose        phentermine 30 MG capsule     30 capsule    Take 1 capsule (30 mg) by mouth every morning    Class 1 obesity due to excess calories without serious comorbidity with body mass index (BMI) of 31.0 to 31.9 in adult       SUMAtriptan 25 MG tablet    IMITREX    18 tablet    Take 1-2 tablets (25-50 mg) by mouth at onset of headache for migraine May repeat dose in 2 hours.  Do not exceed 200 mg in 24 hours    Migraine with aura and without status migrainosus, not intractable       traZODone 50 MG tablet    DESYREL    60 tablet    Take 1-2 tablets ( mg) by mouth nightly as needed for sleep    Persistent insomnia

## 2018-05-11 NOTE — PROGRESS NOTES
SUBJECTIVE:   CC: Christina Ornelas is an 27 year old woman who presents for preventive health visit.     Physical   Annual:     Getting at least 3 servings of Calcium per day::  Yes    Bi-annual eye exam::  Yes    Dental care twice a year::  Yes    Sleep apnea or symptoms of sleep apnea::  Daytime drowsiness    Diet::  Regular (no restrictions)    Frequency of exercise::  None    Taking medications regularly::  Yes    Medication side effects::  Not applicable    Additional concerns today::  No            Also, she has additional complaints of has a difficult time staying asleep.  No issues getting to sleep.  Wakes up around 3 and cant get back to sleep.  .  Also, she has additional complaints of acne: had tried different over the counter medications and not helpful. Is getting marries and would like to get face cleared up.   .  Also, she has additional complaints of overwieght: would like to try phentermine for weightloss. .          Today's PHQ-2 Score:   PHQ-2 ( 1999 Pfizer) 5/11/2018   Q1: Little interest or pleasure in doing things 0   Q2: Feeling down, depressed or hopeless 0   PHQ-2 Score 0   Q1: Little interest or pleasure in doing things Not at all   Q2: Feeling down, depressed or hopeless Not at all   PHQ-2 Score 0       Abuse: Current or Past(Physical, Sexual or Emotional)- No  Do you feel safe in your environment - Yes    Social History   Substance Use Topics     Smoking status: Former Smoker     Types: Cigarettes     Quit date: 6/1/2011     Smokeless tobacco: Never Used     Alcohol use No      Comment: once A WEEK, 2-3 beers at times      Alcohol Use 5/11/2018   If you drink alcohol do you typically have greater than 3 drinks per day OR greater than 7 drinks per week? No       Reviewed orders with patient.  Reviewed health maintenance and updated orders accordingly - Yes  BP Readings from Last 3 Encounters:   05/11/18 119/80   05/19/17 122/74   04/17/17 121/83    Wt Readings from Last 3 Encounters:    05/11/18 176 lb 8 oz (80.1 kg)   05/19/17 160 lb (72.6 kg)   04/17/17 161 lb (73 kg)                  Current Outpatient Prescriptions   Medication Sig Dispense Refill     citalopram (CELEXA) 20 MG tablet 1 tablet orally daily 90 tablet 3     clindamycin (CLEOCIN-T) 1 % lotion Apply topically 2 times daily 60 mL 1     levonorgestrel (MIRENA) 20 MCG/24HR IUD 1 each (20 mcg) by Intrauterine route once for 1 dose 1 each 0     phentermine 30 MG capsule Take 1 capsule (30 mg) by mouth every morning 30 capsule 1     SUMAtriptan (IMITREX) 25 MG tablet Take 1-2 tablets (25-50 mg) by mouth at onset of headache for migraine May repeat dose in 2 hours.  Do not exceed 200 mg in 24 hours 18 tablet 3     traZODone (DESYREL) 50 MG tablet Take 1-2 tablets ( mg) by mouth nightly as needed for sleep 60 tablet 3     Recent Labs   Lab Test  04/17/17   0912  11/14/11   1211   LDL   --   99   HDL   --   63   TRIG   --   136   CR  0.77   --    GFRESTIMATED  89   --    GFRESTBLACK  >90   GFR Calc     --    POTASSIUM  3.7   --    TSH   --   1.06        Mammogram not appropriate for this patient based on age.    Pertinent mammograms are reviewed under the imaging tab.  History of abnormal Pap smear: NO - age 21-29 PAP every 3 years recommended    Reviewed and updated as needed this visit by clinical staff         Reviewed and updated as needed this visit by Provider            Review of Systems  CONSTITUTIONAL: NEGATIVE for fever, chills, change in weight  INTEGUMENTARU/SKIN: NEGATIVE for worrisome rashes, moles or lesions  EYES: NEGATIVE for vision changes or irritation  ENT: NEGATIVE for ear, mouth and throat problems  RESP: NEGATIVE for significant cough or SOB  BREAST: NEGATIVE for masses, tenderness or discharge  CV: NEGATIVE for chest pain, palpitations or peripheral edema  GI: NEGATIVE for nausea, abdominal pain, heartburn, or change in bowel habits  : NEGATIVE for unusual urinary or vaginal symptoms. Periods  are regular.  MUSCULOSKELETAL: NEGATIVE for significant arthralgias or myalgia  NEURO: NEGATIVE for weakness, dizziness or paresthesias  PSYCHIATRIC: NEGATIVE for changes in mood or affect     OBJECTIVE:   There were no vitals taken for this visit.  Physical Exam  GENERAL: healthy, alert and no distress  EYES: Eyes grossly normal to inspection, PERRL and conjunctivae and sclerae normal  HENT: ear canals and TM's normal, nose and mouth without ulcers or lesions  NECK: no adenopathy, no asymmetry, masses, or scars and thyroid normal to palpation  RESP: lungs clear to auscultation - no rales, rhonchi or wheezes  BREAST: normal without masses, tenderness or nipple discharge and no palpable axillary masses or adenopathy  CV: regular rate and rhythm, normal S1 S2, no S3 or S4, no murmur, click or rub, no peripheral edema and peripheral pulses strong  ABDOMEN: soft, nontender, no hepatosplenomegaly, no masses and bowel sounds normal   (female): normal female external genitalia, normal urethral meatus, vaginal mucosa pink, moist, well rugated, and normal cervix/adnexa/uterus without masses or discharge  MS: no gross musculoskeletal defects noted, no edema  SKIN: no suspicious lesions or rashes: acne on face and back   NEURO: Normal strength and tone, mentation intact and speech normal  PSYCH: mentation appears normal, affect normal/bright    ASSESSMENT/PLAN:   1. Routine general medical examination at a health care facility    - Pap imaged thin layer screen reflex to HPV if ASCUS - recommend age 25 - 29    2. Major depressive disorder, single episode, mild (H)    - citalopram (CELEXA) 20 MG tablet; 1 tablet orally daily  Dispense: 90 tablet; Refill: 3    3. Anxiety    - citalopram (CELEXA) 20 MG tablet; 1 tablet orally daily  Dispense: 90 tablet; Refill: 3  - OFFICE/OUTPT VISIT,EST,LEVL III    4. Persistent insomnia    - traZODone (DESYREL) 50 MG tablet; Take 1-2 tablets ( mg) by mouth nightly as needed for sleep   "Dispense: 60 tablet; Refill: 3  - OFFICE/OUTPT VISIT,EST,LEVL III    5. Acne, unspecified acne type    - clindamycin (CLEOCIN-T) 1 % lotion; Apply topically 2 times daily  Dispense: 60 mL; Refill: 1  - OFFICE/OUTPT VISIT,EST,LEVL III    6. Class 1 obesity due to excess calories without serious comorbidity with body mass index (BMI) of 31.0 to 31.9 in adult    - phentermine 30 MG capsule; Take 1 capsule (30 mg) by mouth every morning  Dispense: 30 capsule; Refill: 1    COUNSELING:  Reviewed preventive health counseling, as reflected in patient instructions       Regular exercise       Healthy diet/nutrition       Vision screening       Hearing screening         reports that she quit smoking about 6 years ago. Her smoking use included Cigarettes. She has never used smokeless tobacco.    Estimated body mass index is 27.46 kg/(m^2) as calculated from the following:    Height as of 5/19/17: 5' 4\" (1.626 m).    Weight as of 5/19/17: 160 lb (72.6 kg).       Counseling Resources:  ATP IV Guidelines  Pooled Cohorts Equation Calculator  Breast Cancer Risk Calculator  FRAX Risk Assessment  ICSI Preventive Guidelines  Dietary Guidelines for Americans, 2010  [employer]'s MyPlate  ASA Prophylaxis  Lung CA Screening    Ramona Ann Aaseby-Aguilera, PA-C  [facility]  Answers for HPI/ROS submitted by the patient on 5/11/2018   PHQ-2 Score: 0      Patient Instructions     (Z00.00) Routine general medical examination at a health care facility  (primary encounter diagnosis)  Comment:   Plan:     (F32.0) Major depressive disorder, single episode, mild (H)  Comment: stab;le  Plan: citalopram (CELEXA) 20 MG tablet            (F41.9) Anxiety  Comment: stable  Plan: citalopram (CELEXA) 20 MG tablet, OFFICE/OUTPT         VISIT,EST,LEVL III            (G47.00) Persistent insomnia  Comment: well try trazodone: 1-2 tablets approximately 1 hour before bedtime. Please follow-up if symptoms fail to resolve or worsen    Plan: traZODone " (DESYREL) 50 MG tablet, OFFICE/OUTPT         VISIT,EST,LEVL III            (L70.9) Acne, unspecified acne type  Comment: does not want an oral medication,  Well try topical.  Please follow-up if symptoms fail to resolve or worsen    Plan: clindamycin (CLEOCIN-T) 1 % lotion,         OFFICE/OUTPT VISIT,EST,LEVL III            Obeseity: wants to try phentermine: Risks, benefits, side effects and intended purposes discussed.    Advised 1 month and then follow-up.               Preventive Health Recommendations  Female Ages 26 - 39  Yearly exam:   See your health care provider every year in order to    Review health changes.     Discuss preventive care.      Review your medicines if you your doctor has prescribed any.    Until age 30: Get a Pap test every three years (more often if you have had an abnormal result).    After age 30: Talk to your doctor about whether you should have a Pap test every 3 years or have a Pap test with HPV screening every 5 years.   You do not need a Pap test if your uterus was removed (hysterectomy) and you have not had cancer.  You should be tested each year for STDs (sexually transmitted diseases), if you're at risk.   Talk to your provider about how often to have your cholesterol checked.  If you are at risk for diabetes, you should have a diabetes test (fasting glucose).  Shots: Get a flu shot each year. Get a tetanus shot every 10 years.   Nutrition:     Eat at least 5 servings of fruits and vegetables each day.    Eat whole-grain bread, whole-wheat pasta and brown rice instead of white grains and rice.    Talk to your provider about Calcium and Vitamin D.     Lifestyle    Exercise at least 150 minutes a week (30 minutes a day, 5 days of the week). This will help you control your weight and prevent disease.    Limit alcohol to one drink per day.    No smoking.     Wear sunscreen to prevent skin cancer.    See your dentist every six months for an exam and cleaning.

## 2018-05-12 ASSESSMENT — PATIENT HEALTH QUESTIONNAIRE - PHQ9: SUM OF ALL RESPONSES TO PHQ QUESTIONS 1-9: 5

## 2018-05-15 DIAGNOSIS — F98.8 ATTENTION DEFICIT DISORDER: ICD-10-CM

## 2018-05-15 LAB
COPATH REPORT: NORMAL
PAP: NORMAL

## 2018-05-15 NOTE — TELEPHONE ENCOUNTER
Controlled Substance Refill Request for   Problem List Complete:  No     PROVIDER TO CONSIDER COMPLETION OF PROBLEM LIST AND OVERVIEW/CONTROLLED SUBSTANCE AGREEMENT    Last Written Prescription Date:  Patient states last summer but I do not see this  Last Fill Quantity:  ,   # refills:     Last Office Visit with [facility] primary care provider: 05/11/2018    Future Office visit:     Controlled substance agreement on file: No.     Processing:  Patient will  in clinic   checked in past 6 months?  No, route to VANESSA Bonds

## 2018-05-16 RX ORDER — DEXTROAMPHETAMINE SACCHARATE, AMPHETAMINE ASPARTATE, DEXTROAMPHETAMINE SULFATE AND AMPHETAMINE SULFATE 5; 5; 5; 5 MG/1; MG/1; MG/1; MG/1
TABLET ORAL
Qty: 31 TABLET | Refills: 0 | OUTPATIENT
Start: 2018-05-16

## 2018-07-11 DIAGNOSIS — E66.09 CLASS 1 OBESITY DUE TO EXCESS CALORIES WITHOUT SERIOUS COMORBIDITY WITH BODY MASS INDEX (BMI) OF 31.0 TO 31.9 IN ADULT: ICD-10-CM

## 2018-07-11 DIAGNOSIS — E66.811 CLASS 1 OBESITY DUE TO EXCESS CALORIES WITHOUT SERIOUS COMORBIDITY WITH BODY MASS INDEX (BMI) OF 31.0 TO 31.9 IN ADULT: ICD-10-CM

## 2018-07-11 NOTE — TELEPHONE ENCOUNTER
Controlled Substance Refill Request for phentermine 30 MG capsule  Problem List Complete:  No     PROVIDER TO CONSIDER COMPLETION OF PROBLEM LIST AND OVERVIEW/CONTROLLED SUBSTANCE AGREEMENT    Last Written Prescription Date:  05/11/2018  Last Fill Quantity: 30 capsule,   # refills: 1    Last Office Visit with [facility] primary care provider: 05/11/2018    Future Office visit:     Controlled substance agreement on file: No.     Processing:  Fax Rx to [facility] pharmacy   checked in past 3 months?  No, route to VANESSA HEREDIA

## 2018-07-11 NOTE — TELEPHONE ENCOUNTER
[place] reviewed:  reviewed- no concerns    [place]  [email or web address]

## 2018-07-12 RX ORDER — PHENTERMINE HYDROCHLORIDE 30 MG/1
CAPSULE ORAL
Qty: 30 CAPSULE | Refills: 0 | Status: SHIPPED | OUTPATIENT
Start: 2018-07-12 | End: 2018-09-28

## 2018-07-12 NOTE — TELEPHONE ENCOUNTER
Rx approved, fax to the [facility] will notified patient when prescription is ready to be picked up.   Kassandra Nesbitt

## 2018-09-28 ENCOUNTER — OFFICE VISIT (OUTPATIENT)
Dept: PEDIATRICS | Facility: CLINIC | Age: 28
End: 2018-09-28
Payer: COMMERCIAL

## 2018-09-28 VITALS
SYSTOLIC BLOOD PRESSURE: 123 MMHG | HEART RATE: 95 BPM | TEMPERATURE: 98.6 F | DIASTOLIC BLOOD PRESSURE: 77 MMHG | BODY MASS INDEX: 27.48 KG/M2 | WEIGHT: 160.1 LBS

## 2018-09-28 DIAGNOSIS — Z30.432 ENCOUNTER FOR IUD REMOVAL: Primary | ICD-10-CM

## 2018-09-28 PROCEDURE — 58301 REMOVE INTRAUTERINE DEVICE: CPT | Performed by: NURSE PRACTITIONER

## 2018-09-28 RX ORDER — CITALOPRAM HYDROBROMIDE 20 MG/1
TABLET ORAL
Qty: 90 TABLET | Refills: 3 | Status: CANCELLED | OUTPATIENT
Start: 2018-09-28

## 2018-09-28 RX ORDER — PHENTERMINE HYDROCHLORIDE 30 MG/1
CAPSULE ORAL
Qty: 30 CAPSULE | Refills: 0 | Status: CANCELLED | OUTPATIENT
Start: 2018-09-28

## 2018-09-28 RX ORDER — TRAZODONE HYDROCHLORIDE 50 MG/1
50-100 TABLET, FILM COATED ORAL
Qty: 60 TABLET | Refills: 3 | Status: CANCELLED | OUTPATIENT
Start: 2018-09-28

## 2018-09-28 ASSESSMENT — ANXIETY QUESTIONNAIRES
1. FEELING NERVOUS, ANXIOUS, OR ON EDGE: NOT AT ALL
IF YOU CHECKED OFF ANY PROBLEMS ON THIS QUESTIONNAIRE, HOW DIFFICULT HAVE THESE PROBLEMS MADE IT FOR YOU TO DO YOUR WORK, TAKE CARE OF THINGS AT HOME, OR GET ALONG WITH OTHER PEOPLE: NOT DIFFICULT AT ALL
2. NOT BEING ABLE TO STOP OR CONTROL WORRYING: NOT AT ALL
GAD7 TOTAL SCORE: 0
3. WORRYING TOO MUCH ABOUT DIFFERENT THINGS: NOT AT ALL
6. BECOMING EASILY ANNOYED OR IRRITABLE: NOT AT ALL
5. BEING SO RESTLESS THAT IT IS HARD TO SIT STILL: NOT AT ALL
7. FEELING AFRAID AS IF SOMETHING AWFUL MIGHT HAPPEN: NOT AT ALL

## 2018-09-28 ASSESSMENT — PATIENT HEALTH QUESTIONNAIRE - PHQ9: 5. POOR APPETITE OR OVEREATING: NOT AT ALL

## 2018-09-28 NOTE — MR AVS SNAPSHOT
"              After Visit Summary   2018    Christina Ornelas    MRN: [id number]           Patient Information     Date Of Birth          1990        Visit Information        Provider Department      2018 9:00 AM Marilyn Harmon APRN CNP [facility]        Today's Diagnoses     Encounter for IUD removal    -  1       Follow-ups after your visit        Follow-up notes from your care team     Return in about 6 months (around 3/28/2019) for Annual Preventative Exam.      Who to contact     If you have questions or need follow up information about today's clinic visit or your schedule please contact [facility] directly at [phone].  Normal or non-critical lab and imaging results will be communicated to you by MyChart, letter or phone within 4 business days after the clinic has received the results. If you do not hear from us within 7 days, please contact the clinic through MyChart or phone. If you have a critical or abnormal lab result, we will notify you by phone as soon as possible.  Submit refill requests through [employer] or call your pharmacy and they will forward the refill request to us. Please allow 3 business days for your refill to be completed.          Additional Information About Your Visit        MyChart Information     [employer] lets you send messages to your doctor, view your test results, renew your prescriptions, schedule appointments and more. To sign up, go to [email or web address] . Click on \"Log in\" on the left side of the screen, which will take you to the Welcome page. Then click on \"Sign up Now\" on the right side of the page.     You will be asked to enter the access code listed below, as well as some personal information. Please follow the directions to create your username and password.     Your access code is: [email or web address]  Expires: 2018 10:19 AM     Your access code will  in 90 days. If you need help or a new code, please call " your [place] clinic or [phone].        Care EveryWhere ID     This is your Care EveryWhere ID. This could be used by other organizations to access your [place] medical records  [id number]        Your Vitals Were     Pulse Temperature BMI (Body Mass Index)             95 98.6  F (37  C) (Tympanic) 27.48 kg/m2          Blood Pressure from Last 3 Encounters:   09/28/18 123/77   05/11/18 119/80   05/19/17 122/74    Weight from Last 3 Encounters:   09/28/18 160 lb 1.6 oz (72.6 kg)   05/11/18 176 lb 8 oz (80.1 kg)   05/19/17 160 lb (72.6 kg)              We Performed the Following     REMOVE INTRAUTERINE DEVICE        Primary Care Provider Office Phone # Fax #    Ramona Ann Aaseby-Aguilera, PA-C [phone] [address] [address] [facility] [address]        Equal Access to Services     CARMELINA CARPENTER : Hadii aad ku hadasho Soomaali, waaxda luqadaha, qaybta kaalmada adeegyada, waxay dannielle gipson . So [facility] [phone].    ATENCIÓN: Si umala espdaisy, tiene a fox disposición servicios gratuitos de asistencia lingüística. Llame al [phone].    We comply with applicable federal civil rights laws and [place] laws. We do not discriminate on the basis of race, color, national origin, age, disability, sex, sexual orientation, or gender identity.            Thank you!     Thank you for choosing [facility]  for your care. Our goal is always to provide you with excellent care. Hearing back from our patients is one way we can continue to improve our services. Please take a few minutes to complete the written survey that you may receive in the mail after your visit with us. Thank you!             Your Updated Medication List - Protect others around you: Learn how to safely use, store and throw away your medicines at www.disposemymeds.org.          This list is accurate as of 9/28/18 10:19 AM.  Always use your most recent med list.                   Brand Name Dispense  Instructions for use Diagnosis    clindamycin 1 % lotion    CLEOCIN-T    60 mL    Apply topically 2 times daily    Acne, unspecified acne type       SUMAtriptan 25 MG tablet    IMITREX    18 tablet    Take 1-2 tablets (25-50 mg) by mouth at onset of headache for migraine May repeat dose in 2 hours.  Do not exceed 200 mg in 24 hours    Migraine with aura and without status migrainosus, not intractable

## 2018-09-28 NOTE — PROGRESS NOTES
SUBJECTIVE:  Christina Ornelas, a 27 year old female scheduled an appointment to discuss the following issues:  Encounter for IUD removal  Has had mirena in place x 1 yr, getting  in 2 month, pregnancy seeking    Medical, social, surgical, and family histories reviewed.      OBJECTIVE:  /77  Pulse 95  Temp 98.6  F (37  C) (Tympanic)  Wt 160 lb 1.6 oz (72.6 kg)  BMI 27.48 kg/m2  EXAM:  GENERAL APPEARANCE: healthy, alert and no distress  : normal cervix, adnexae, and uterus without masses or discharge and IUD strings visualized in os    ASSESSMENT / PLAN:  (Z30.432) Encounter for IUD removal  (primary encounter diagnosis)  Comment: ring forceps used to remove IUD intact without resistance. Encouraged prenatal vit, and take home UPT if no menses in 1 month.   Plan: REMOVE INTRAUTERINE DEVICE              There are no Patient Instructions on file for this visit.

## 2018-09-29 ASSESSMENT — ANXIETY QUESTIONNAIRES: GAD7 TOTAL SCORE: 0

## 2018-09-29 ASSESSMENT — PATIENT HEALTH QUESTIONNAIRE - PHQ9: SUM OF ALL RESPONSES TO PHQ QUESTIONS 1-9: 0

## 2018-11-07 ENCOUNTER — OFFICE VISIT (OUTPATIENT)
Dept: URGENT CARE | Facility: URGENT CARE | Age: 28
End: 2018-11-07
Payer: COMMERCIAL

## 2018-11-07 VITALS
DIASTOLIC BLOOD PRESSURE: 60 MMHG | TEMPERATURE: 98.9 F | OXYGEN SATURATION: 99 % | HEART RATE: 75 BPM | SYSTOLIC BLOOD PRESSURE: 112 MMHG

## 2018-11-07 DIAGNOSIS — S61.452A DOG BITE OF LEFT HAND, INITIAL ENCOUNTER: ICD-10-CM

## 2018-11-07 DIAGNOSIS — S61.412A LACERATION OF LEFT HAND WITHOUT FOREIGN BODY, INITIAL ENCOUNTER: Primary | ICD-10-CM
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
## 2018-11-07 PROCEDURE — 99213 OFFICE O/P EST LOW 20 MIN: CPT | Mod: 25 | Performed by: FAMILY MEDICINE

## 2018-11-07 PROCEDURE — 12002 RPR S/N/AX/GEN/TRNK2.6-7.5CM: CPT | Performed by: FAMILY MEDICINE

## 2018-11-07 RX ORDER — TRAZODONE HYDROCHLORIDE 50 MG/1
TABLET, FILM COATED ORAL
Refills: 3 | COMMUNITY
Start: 2018-05-11 | End: 2019-05-31

## 2018-11-07 RX ORDER — CITALOPRAM HYDROBROMIDE 20 MG/1
TABLET ORAL
Refills: 3 | COMMUNITY
Start: 2018-05-11 | End: 2019-05-31

## 2018-11-07 RX ORDER — METRONIDAZOLE 500 MG/1
500 TABLET ORAL 3 TIMES DAILY
Qty: 30 TABLET | Refills: 0 | Status: SHIPPED | OUTPATIENT
Start: 2018-11-07 | End: 2018-11-17

## 2018-11-07 RX ORDER — DOXYCYCLINE 100 MG/1
100 CAPSULE ORAL 2 TIMES DAILY
Qty: 20 CAPSULE | Refills: 0 | Status: SHIPPED | OUTPATIENT
Start: 2018-11-07 | End: 2018-11-17

## 2018-11-07 NOTE — PATIENT INSTRUCTIONS
Take full course of antibiotic for dog bite to prevent infection.  No alcohol with Flagyl antibiotic  Okay for tylenol and ibuprofen for discomfort.  Keep hand elevated    Suture removal in 7-10 days.                     Wound Closure and Wound Care  What is wound closure?   Wounds heal more quickly and with less risk of infection and scarring when the wound is cleaned and the wound edges are held together (closed). Scrapes, scratches, puncture wounds, and shallow cuts may need only cleaning, ointment, and a bandage. Some cuts may need to be closed with tape strips called Steri-Strips or tissue adhesive liquid (skin glue). If a cut or surgical incision is deep, very long, jagged, or under a lot of tension (such as a cut over a joint), stitches (also called sutures) or staples may be needed to close the wound.   How do I take care of my wound and sutures?   If you get an accidental cut, put pressure on the wound with a gauze pad or clean cloth right away to stop the bleeding. Then gently but thoroughly wash it with soap and cool water. Soapy water can be used around, but not in the cut. Try to remove all dirt and debris but do not scrub vigorously. If you decide to get medical treatment, cover the wound and apply pressure as needed to control bleeding while traveling to your healthcare provider's office, urgent care clinic, or emergency room.   After a wound is closed by your healthcare provider, the wound and the area around it must be kept clean and dry. The care of a stapled wound is similar to the care of a sutured wound. There are minor differences in caring for a wound closed with skin glue.   Do not let a wound closed with stitches or staples get wet for the first 24 hours. After 24 hours, you can shower or you can clean the wound with hydrogen peroxide or gently wash it with soap and warm water twice a day.   If your wound was closed with skin glue, keep the wound dry for the first 4 hours after the skin glue  "was put on. After the first 4 hours, you may occasionally and briefly wet the wound in the shower. You can clean the wound with hydrogen peroxide or gently wash it with soap and water twice a day.   If your wound is closed with Steri-Strips, they may be more likely to separate if they get wet. Keep them dry for the first few days while you're in the shower or bath.   Do not soak or scrub the wound. Do not take a bath, go swimming, or use a hot tub.   If recommended by your healthcare provider, you may put a small amount of antibiotic ointment on the wound each time you clean it. This can prevent infection. It will also help keep bandages from sticking to the wound. If a rash appears, stop using the ointment. If your wound is closed with skin glue, do not put liquid, antibiotic ointment, or any other product on the wound while the adhesive is in place. It may loosen the film before the wound is healed.   Make sure the wound is kept dry between washings. After showering or bathing, gently pat the wound dry with a soft towel.   Your healthcare provider may recommend that you cover the wound with gauze or a new, bandage to keep it from getting dirty. Be sure to keep the bandage dry. Put on a new bandage after cleaning the wound of if the old one gets dirty or wet.   Your healthcare provider may recommend leaving the wound \"open to air\" and not covered by a bandage while you sleep to help speed up the healing process. If the wound was closed with skin glue, you do not need a bandage.   For the first 1 or 2 days keep the area propped up higher than your heart. This will help lessen your pain and any swelling.   Protect the wound from repeat injury until the skin has had time to heal.   Protect the wound from a lot of exposure to sunlight or tanning lamps while skin glue is in place. Wounds exposed to the sun can become red, while scars that have not been exposed to the sun usually turn white after a period of time.   Do " not scratch, rub, or pick at your stitches, staples, or skin glue. This may cause them to loosen before the wound is healed.   Avoid activities that will make you sweat a lot until the skin glue has naturally fallen off or the stitches or staples have been removed.   Any wound can become infected. When you are cleaning your wound, look for these signs of infection:   increased redness   red streaks   increased swelling   increased pain or tenderness   pus or other drainage   warmth in the area of the wound   fever.   Contact your provider if you see any signs of infection.   If your wound was accidental, be sure to ask if a tetanus booster is needed. Treatment of accidental wounds may include taking an oral antibiotic to help prevent infection. Be sure to take the medicine until it is completely gone. Do not stop taking it just because the wound looks like it is healing well.   When are stitches, staples, or other types of wound closures removed?   Steri-Strips are usually left on until they fall off. If they have not fallen off after 2 weeks, they should be removed. Skin glue usually falls off on its own in 5 to 10 days.   For deep cuts the first stitches are placed under the skin. These stitches are made of materials that dissolve and do not need to be removed. Sutures or staples on the surface of the skin need to be removed by your healthcare provider 5 to 14 days after they are put in. The length of time depends on where the cut is. Sutures in wounds on the face usually can be removed after 5 to 7 days. In areas of high stress, such as hands, knees, or elbows, the sutures must stay in 10 to 14 days. Your provider will tell you when you should come to the office for removal of your sutures or staples. Do NOT remove sutures or staples yourself unless your provider instructs you to do so. Staples are removed using a special tool. If you don't have the tool, don't try to remove the staples.   When should I call my  healthcare provider?   Some swelling, redness, and pain are common with all wounds and normally go away as the wound heals.   Call your provider right away if:   You start to have any signs or symptoms of infection. These include:   Your skin is redder or more painful.   You have red streaks from the wound going toward your heart.   The wound area is very warm to touch.   You have pus or other fluid coming from the wound area.   You have a fever higher than 101.5? F (38.6? C).   You have chills, nausea, vomiting, or muscle aches.   The wound seems to be opening up or you notice any drainage.   The wound bleeds for more than 10 minutes.   The stitches or staples are loose.   The skin glue is loosening before it is supposed to.   You have any symptoms that worry you.     Published by [employer].  This content is reviewed periodically and is subject to change as new health information becomes available. The information is intended to inform and educate and is not a replacement for medical evaluation, advice, diagnosis or treatment by a healthcare professional.   Written by Benjamin Bell MD.   ? 2010 [employer] and/or its affiliates. All Rights Reserved.   Copyright   [employer] 2011

## 2018-11-07 NOTE — PROGRESS NOTES
SUBJECTIVE:     Chief Complaint   Patient presents with     Urgent Care     Laceration     dog bite x 30 minutes ago on left hand     Christina Ornelas is a 27 year old female who presents to the clinic with a laceration on the left hand sustained 1 hour(s) ago.  This is a non-work related and accidental injury.  Dogs were fighting with each other, she intervene and ended up gotten bitten.  Rabies vaccination UTD.  Patient is getting  next month.  Mechanism of injury: dog bite.    Associated symptoms: Denies numbness, weakness, or loss of function  Last tetanus booster within 10 years: yes, 2017    EXAM:   The patient appears today in alert,no apparent distress distress  VITALS: /60 (BP Location: Right arm, Patient Position: Chair, Cuff Size: Adult Regular)  Pulse 75  Temp 98.9  F (37.2  C) (Tympanic)  SpO2 99%    Size of laceration: 3 centimeters (dorsum hand 1 cm, thenar 2 cm)  Characteristics of the laceration: bleeding- mild, puncture and straight  Tendon function intact: yes  Sensation to light touch intact: yes  Pulses intact: yes  Picture included in patient's chart: no    Assessment:     Dog bite of left hand, initial encounter  Laceration of left hand without foreign body, initial encounter    PLAN:  PROCEDURE NOTE::  Wound was locally injected with 6 cc's of Lidocaine 1% with epinephrine  Good anesthesia was obtained  Prepped and draped in the usual sterile fashion  Wound cleaned with betadine/saline solution  Wound cleaned with Shur-Clens  Laceration was closed using 8 4-0 nylon interrupted sutures (2 dorsum, 6 on thenar)    After care instructions:  Keep wound clean and dry for the next 24-48 hours  Sutures out in 7-10 days  Signs of infection discussed today  Apply anti-bacterial ointment for 5 days  Discussed the probability of scarring    RX Doxycyline 100 mg BID X10 days and RX Flagyl 500 mg TID X10 days given to prevent infection due to bite on hand.  No alcohol while on  Flagyl.    Return to clinic if no resolution of symptoms.    Quinton Cameron MD  November 7, 2018 5:42 PM

## 2018-11-07 NOTE — MR AVS SNAPSHOT
After Visit Summary   11/7/2018    Christina Ornelas    MRN: [id number]           Patient Information     Date Of Birth          1990        Visit Information        Provider Department      11/7/2018 3:50 PM Quinton Cameron MD [facility]        Today's Diagnoses     Laceration of left hand without foreign body, initial encounter    -  1    Dog bite of left hand, initial encounter          Care Instructions    Take full course of antibiotic for dog bite to prevent infection.  No alcohol with Flagyl antibiotic  Okay for tylenol and ibuprofen for discomfort.  Keep hand elevated    Suture removal in 7-10 days.                     Wound Closure and Wound Care  What is wound closure?   Wounds heal more quickly and with less risk of infection and scarring when the wound is cleaned and the wound edges are held together (closed). Scrapes, scratches, puncture wounds, and shallow cuts may need only cleaning, ointment, and a bandage. Some cuts may need to be closed with tape strips called Steri-Strips or tissue adhesive liquid (skin glue). If a cut or surgical incision is deep, very long, jagged, or under a lot of tension (such as a cut over a joint), stitches (also called sutures) or staples may be needed to close the wound.   How do I take care of my wound and sutures?   If you get an accidental cut, put pressure on the wound with a gauze pad or clean cloth right away to stop the bleeding. Then gently but thoroughly wash it with soap and cool water. Soapy water can be used around, but not in the cut. Try to remove all dirt and debris but do not scrub vigorously. If you decide to get medical treatment, cover the wound and apply pressure as needed to control bleeding while traveling to your healthcare provider's office, urgent care clinic, or emergency room.   After a wound is closed by your healthcare provider, the wound and the area around it must be kept clean and dry. The care of a  "stapled wound is similar to the care of a sutured wound. There are minor differences in caring for a wound closed with skin glue.   Do not let a wound closed with stitches or staples get wet for the first 24 hours. After 24 hours, you can shower or you can clean the wound with hydrogen peroxide or gently wash it with soap and warm water twice a day.   If your wound was closed with skin glue, keep the wound dry for the first 4 hours after the skin glue was put on. After the first 4 hours, you may occasionally and briefly wet the wound in the shower. You can clean the wound with hydrogen peroxide or gently wash it with soap and water twice a day.   If your wound is closed with Steri-Strips, they may be more likely to separate if they get wet. Keep them dry for the first few days while you're in the shower or bath.   Do not soak or scrub the wound. Do not take a bath, go swimming, or use a hot tub.   If recommended by your healthcare provider, you may put a small amount of antibiotic ointment on the wound each time you clean it. This can prevent infection. It will also help keep bandages from sticking to the wound. If a rash appears, stop using the ointment. If your wound is closed with skin glue, do not put liquid, antibiotic ointment, or any other product on the wound while the adhesive is in place. It may loosen the film before the wound is healed.   Make sure the wound is kept dry between washings. After showering or bathing, gently pat the wound dry with a soft towel.   Your healthcare provider may recommend that you cover the wound with gauze or a new, bandage to keep it from getting dirty. Be sure to keep the bandage dry. Put on a new bandage after cleaning the wound of if the old one gets dirty or wet.   Your healthcare provider may recommend leaving the wound \"open to air\" and not covered by a bandage while you sleep to help speed up the healing process. If the wound was closed with skin glue, you do not need " a bandage.   For the first 1 or 2 days keep the area propped up higher than your heart. This will help lessen your pain and any swelling.   Protect the wound from repeat injury until the skin has had time to heal.   Protect the wound from a lot of exposure to sunlight or tanning lamps while skin glue is in place. Wounds exposed to the sun can become red, while scars that have not been exposed to the sun usually turn white after a period of time.   Do not scratch, rub, or pick at your stitches, staples, or skin glue. This may cause them to loosen before the wound is healed.   Avoid activities that will make you sweat a lot until the skin glue has naturally fallen off or the stitches or staples have been removed.   Any wound can become infected. When you are cleaning your wound, look for these signs of infection:   increased redness   red streaks   increased swelling   increased pain or tenderness   pus or other drainage   warmth in the area of the wound   fever.   Contact your provider if you see any signs of infection.   If your wound was accidental, be sure to ask if a tetanus booster is needed. Treatment of accidental wounds may include taking an oral antibiotic to help prevent infection. Be sure to take the medicine until it is completely gone. Do not stop taking it just because the wound looks like it is healing well.   When are stitches, staples, or other types of wound closures removed?   Steri-Strips are usually left on until they fall off. If they have not fallen off after 2 weeks, they should be removed. Skin glue usually falls off on its own in 5 to 10 days.   For deep cuts the first stitches are placed under the skin. These stitches are made of materials that dissolve and do not need to be removed. Sutures or staples on the surface of the skin need to be removed by your healthcare provider 5 to 14 days after they are put in. The length of time depends on where the cut is. Sutures in wounds on the face  usually can be removed after 5 to 7 days. In areas of high stress, such as hands, knees, or elbows, the sutures must stay in 10 to 14 days. Your provider will tell you when you should come to the office for removal of your sutures or staples. Do NOT remove sutures or staples yourself unless your provider instructs you to do so. Staples are removed using a special tool. If you don't have the tool, don't try to remove the staples.   When should I call my healthcare provider?   Some swelling, redness, and pain are common with all wounds and normally go away as the wound heals.   Call your provider right away if:   You start to have any signs or symptoms of infection. These include:   Your skin is redder or more painful.   You have red streaks from the wound going toward your heart.   The wound area is very warm to touch.   You have pus or other fluid coming from the wound area.   You have a fever higher than 101.5? F (38.6? C).   You have chills, nausea, vomiting, or muscle aches.   The wound seems to be opening up or you notice any drainage.   The wound bleeds for more than 10 minutes.   The stitches or staples are loose.   The skin glue is loosening before it is supposed to.   You have any symptoms that worry you.     Published by [employer].  This content is reviewed periodically and is subject to change as new health information becomes available. The information is intended to inform and educate and is not a replacement for medical evaluation, advice, diagnosis or treatment by a healthcare professional.   Written by Benjamin Bell MD.   ? 2010 [employer] and/or its affiliates. All Rights Reserved.   Copyright   [employer] 2011                Follow-ups after your visit        Who to contact     If you have questions or need follow up information about today's clinic visit or your schedule please contact [facility] directly at [phone].  Normal or non-critical lab and imaging  "results will be communicated to you by MyChart, letter or phone within 4 business days after the clinic has received the results. If you do not hear from us within 7 days, please contact the clinic through [employer] or phone. If you have a critical or abnormal lab result, we will notify you by phone as soon as possible.  Submit refill requests through [employer] or call your pharmacy and they will forward the refill request to us. Please allow 3 business days for your refill to be completed.          Additional Information About Your Visit        [employer] Information     [employer] lets you send messages to your doctor, view your test results, renew your prescriptions, schedule appointments and more. To sign up, go to [email or web address] . Click on \"Log in\" on the left side of the screen, which will take you to the Welcome page. Then click on \"Sign up Now\" on the right side of the page.     You will be asked to enter the access code listed below, as well as some personal information. Please follow the directions to create your username and password.     Your access code is: [email or web address]  Expires: 2018  9:19 AM     Your access code will  in 90 days. If you need help or a new code, please call your [facility] clinic or [phone].        Care EveryWhere ID     This is your Care EveryWhere ID. This could be used by other organizations to access your [facility] medical records  [id number]        Your Vitals Were     Pulse Temperature Pulse Oximetry             75 98.9  F (37.2  C) (Tympanic) 99%          Blood Pressure from Last 3 Encounters:   18 112/60   18 123/77   18 119/80    Weight from Last 3 Encounters:   18 160 lb 1.6 oz (72.6 kg)   18 176 lb 8 oz (80.1 kg)   17 160 lb (72.6 kg)              We Performed the Following     Between 2.6cm - 7.5 cm          Today's Medication Changes          These changes are accurate as of 18  4:58 PM.  If you have any questions, " ask your nurse or doctor.               Start taking these medicines.        Dose/Directions    doxycycline 100 MG capsule   Commonly known as:  VIBRAMYCIN   Used for:  Dog bite of left hand, initial encounter        Dose:  100 mg   Take 1 capsule (100 mg) by mouth 2 times daily for 10 days   Quantity:  20 capsule   Refills:  0       metroNIDAZOLE 500 MG tablet   Commonly known as:  FLAGYL   Used for:  Dog bite of left hand, initial encounter        Dose:  500 mg   Take 1 tablet (500 mg) by mouth 3 times daily for 10 days   Quantity:  30 tablet   Refills:  0            Where to get your medicines      These medications were sent to [facility] [place] - [place] - [address]   [address]  [address], [place] [address]     Phone:  [phone]     doxycycline 100 MG capsule    metroNIDAZOLE 500 MG tablet                Primary Care Provider Office Phone # Fax #    Alisson Ann Aaseby-Aguilera, PA-C [phone] [address] [address]        Equal Access to Services     [facility]: Hadii raina ku hadasho Soomaali, waaxda luqadaha, qaybta kaalmada adeegyada, waxay mioin hayhola gipson . So [facility] [phone].    ATENCIÓN: Si umala espdaisy, tiene a fox disposición servicios gratuitos de asistencia lingüística. Llame al [phone].    We comply with applicable federal civil rights laws and [place] laws. We do not discriminate on the basis of race, color, national origin, age, disability, sex, sexual orientation, or gender identity.            Thank you!     Thank you for choosing [facility]  for your care. Our goal is always to provide you with excellent care. Hearing back from our patients is one way we can continue to improve our services. Please take a few minutes to complete the written survey that you may receive in the mail after your visit with us. Thank you!             Your Updated Medication List - Protect others  around you: Learn how to safely use, store and throw away your medicines at www.disposemymeds.org.          This list is accurate as of 11/7/18  4:58 PM.  Always use your most recent med list.                   Brand Name Dispense Instructions for use Diagnosis    citalopram 20 MG tablet    celeXA     TK 1 T PO D        clindamycin 1 % lotion    CLEOCIN-T    60 mL    Apply topically 2 times daily    Acne, unspecified acne type       doxycycline 100 MG capsule    VIBRAMYCIN    20 capsule    Take 1 capsule (100 mg) by mouth 2 times daily for 10 days    Dog bite of left hand, initial encounter       metroNIDAZOLE 500 MG tablet    FLAGYL    30 tablet    Take 1 tablet (500 mg) by mouth 3 times daily for 10 days    Dog bite of left hand, initial encounter       SUMAtriptan 25 MG tablet    IMITREX    18 tablet    Take 1-2 tablets (25-50 mg) by mouth at onset of headache for migraine May repeat dose in 2 hours.  Do not exceed 200 mg in 24 hours    Migraine with aura and without status migrainosus, not intractable       traZODone 50 MG tablet    DESYREL

## 2019-05-28 ENCOUNTER — PRENATAL OFFICE VISIT (OUTPATIENT)
Dept: NURSING | Facility: CLINIC | Age: 29
End: 2019-05-28
Payer: COMMERCIAL

## 2019-05-28 VITALS
BODY MASS INDEX: 30.22 KG/M2 | WEIGHT: 177 LBS | DIASTOLIC BLOOD PRESSURE: 62 MMHG | HEART RATE: 76 BPM | HEIGHT: 64 IN | SYSTOLIC BLOOD PRESSURE: 120 MMHG

## 2019-05-28 DIAGNOSIS — Z34.01 ENCOUNTER FOR SUPERVISION OF NORMAL FIRST PREGNANCY IN FIRST TRIMESTER: Primary | ICD-10-CM

## 2019-05-28 LAB
ABO + RH BLD: NORMAL
ABO + RH BLD: NORMAL
BLD GP AB SCN SERPL QL: NORMAL
BLOOD BANK CMNT PATIENT-IMP: NORMAL
ERYTHROCYTE [DISTWIDTH] IN BLOOD BY AUTOMATED COUNT: 12.2 % (ref 10–15)
HCT VFR BLD AUTO: 37.8 % (ref 35–47)
HGB BLD-MCNC: 13.5 G/DL (ref 11.7–15.7)
MCH RBC QN AUTO: 31 PG (ref 26.5–33)
MCHC RBC AUTO-ENTMCNC: 35.7 G/DL (ref 31.5–36.5)
MCV RBC AUTO: 87 FL (ref 78–100)
PLATELET # BLD AUTO: 168 10E9/L (ref 150–450)
RBC # BLD AUTO: 4.35 10E12/L (ref 3.8–5.2)
SPECIMEN EXP DATE BLD: NORMAL
WBC # BLD AUTO: 11.1 10E9/L (ref 4–11)

## 2019-05-28 PROCEDURE — 87591 N.GONORRHOEAE DNA AMP PROB: CPT | Performed by: OBSTETRICS & GYNECOLOGY

## 2019-05-28 PROCEDURE — 87491 CHLMYD TRACH DNA AMP PROBE: CPT | Performed by: OBSTETRICS & GYNECOLOGY

## 2019-05-28 PROCEDURE — 86762 RUBELLA ANTIBODY: CPT | Performed by: OBSTETRICS & GYNECOLOGY

## 2019-05-28 PROCEDURE — 85027 COMPLETE CBC AUTOMATED: CPT | Performed by: OBSTETRICS & GYNECOLOGY

## 2019-05-28 PROCEDURE — 87389 HIV-1 AG W/HIV-1&-2 AB AG IA: CPT | Performed by: OBSTETRICS & GYNECOLOGY

## 2019-05-28 PROCEDURE — 86780 TREPONEMA PALLIDUM: CPT | Performed by: OBSTETRICS & GYNECOLOGY

## 2019-05-28 PROCEDURE — 86900 BLOOD TYPING SEROLOGIC ABO: CPT | Performed by: OBSTETRICS & GYNECOLOGY

## 2019-05-28 PROCEDURE — 86901 BLOOD TYPING SEROLOGIC RH(D): CPT | Performed by: OBSTETRICS & GYNECOLOGY

## 2019-05-28 PROCEDURE — 86850 RBC ANTIBODY SCREEN: CPT | Performed by: OBSTETRICS & GYNECOLOGY

## 2019-05-28 PROCEDURE — 87086 URINE CULTURE/COLONY COUNT: CPT | Performed by: OBSTETRICS & GYNECOLOGY

## 2019-05-28 PROCEDURE — 99207 ZZC NO CHARGE NURSE ONLY: CPT

## 2019-05-28 PROCEDURE — 87340 HEPATITIS B SURFACE AG IA: CPT | Performed by: OBSTETRICS & GYNECOLOGY

## 2019-05-28 PROCEDURE — 36415 COLL VENOUS BLD VENIPUNCTURE: CPT | Performed by: OBSTETRICS & GYNECOLOGY

## 2019-05-28 RX ORDER — CHLORAL HYDRATE 500 MG
1 CAPSULE ORAL DAILY
Qty: 90 CAPSULE | Refills: 3 | COMMUNITY
Start: 2019-05-28 | End: 2021-03-22

## 2019-05-28 RX ORDER — PRENATAL VIT/IRON FUM/FOLIC AC 27MG-0.8MG
1 TABLET ORAL DAILY
Qty: 90 TABLET | Refills: 3 | Status: SHIPPED | OUTPATIENT
Start: 2019-05-28

## 2019-05-28 SDOH — ECONOMIC STABILITY: INCOME INSECURITY: HOW HARD IS IT FOR YOU TO PAY FOR THE VERY BASICS LIKE FOOD, HOUSING, MEDICAL CARE, AND HEATING?: NOT HARD AT ALL

## 2019-05-28 SDOH — ECONOMIC STABILITY: TRANSPORTATION INSECURITY
IN THE PAST 12 MONTHS, HAS LACK OF TRANSPORTATION KEPT YOU FROM MEETINGS, WORK, OR FROM GETTING THINGS NEEDED FOR DAILY LIVING?: NO

## 2019-05-28 SDOH — ECONOMIC STABILITY: TRANSPORTATION INSECURITY
IN THE PAST 12 MONTHS, HAS THE LACK OF TRANSPORTATION KEPT YOU FROM MEDICAL APPOINTMENTS OR FROM GETTING MEDICATIONS?: NO

## 2019-05-28 SDOH — ECONOMIC STABILITY: FOOD INSECURITY: WITHIN THE PAST 12 MONTHS, YOU WORRIED THAT YOUR FOOD WOULD RUN OUT BEFORE YOU GOT MONEY TO BUY MORE.: NEVER TRUE

## 2019-05-28 SDOH — ECONOMIC STABILITY: FOOD INSECURITY: WITHIN THE PAST 12 MONTHS, THE FOOD YOU BOUGHT JUST DIDN'T LAST AND YOU DIDN'T HAVE MONEY TO GET MORE.: NEVER TRUE

## 2019-05-28 ASSESSMENT — MIFFLIN-ST. JEOR: SCORE: 1507.87

## 2019-05-28 NOTE — PROGRESS NOTES
"Chief Complaint   Patient presents with     Prenatal Care     New Prenatal Nurse Visit   8w2d  Estimated Date of Delivery: 2020      Initial /62 (BP Location: Right arm, Cuff Size: Adult Large)   Pulse 76   Ht 1.626 m (5' 4\")   Wt 80.3 kg (177 lb)   LMP 2019 (Exact Date)   BMI 30.38 kg/m   Estimated body mass index is 30.38 kg/m  as calculated from the following:    Height as of this encounter: 1.626 m (5' 4\").    Weight as of this encounter: 80.3 kg (177 lb).  BP completed using cuff size: large    Questioned patient about current smoking habits.  Pt. quit smoking some time ago.    Prenatal book and folder (containing standard educational hand-outs and brochures) given to patient. Information in folder reviewed. Questions answered. Brochure given on optional screening available to assess chromosomal anomalies. Pt advised to call the clinic if she has any questions or concerns related to her pregnancy. Prenatal labs obtained. New prenatal visit scheduled on 19 with Dr Roblero.        Lab Results   Component Value Date    PAP NIL 2018           Patient supplied answers from flow sheet for:  Prenatal OB Questionnaire.  Past Medical History  Diabetes?: No  Hypertension : No  Heart disease, mitral valve prolapse or rheumatic fever?: No  An autoimmune disease such as lupus or rheumatoid arthritis?: No  Kidney disease or urinary tract infection?: No  Epilepsy, seizures or spells?: No  Migraine headaches?: (!) Yes  A stroke or loss of function or sensation?: No  Any other neurological problems?: No  Have you ever been treated for depression?: (!) Yes  Are you having problems with crying spells or loss of self-esteem?: No  Have you ever required psychiatric care?: No  Have you ever had hepatitis, liver disease or jaundice?: No  Have you been treated for blood clots in your veins, deep vein thromosis, inflammation in the veins, thrombosis, phlebitis, pulmonary embolism or " varicosities?: No  Have you had excessive bleeding after surgery or dental work?: No  Do you bleed more than other women after a cut or scratch?: No  Do you have a history of anemia?: No  Have you ever had thyroid problems or taken thyroid medication?: No   Do you have any endocrine problems?: No  Have you ever been in a major accident or suffered serious trauma?: No  Within the last year, has anyone hit, slapped, kicked or otherwise hurt you?: No  In the last year, has anyone forced you to have sex when you didn't want to?: No    Past Medical History 2   Have you ever received a blood transfusion?: No  Would you refuse a blood transfusion if a doctor judged it to be medically necessary?: No   If you answered Yes, would you rather die than receive a blood transfusion?: No  If you answered Yes, is this for Protestant reasons?: No  Does anyone in your home smoke?: (!) Yes(FOB smokes outside the home)  Do you use tobacco products?: No  Do you drink beer, wine or hard liquor?: No  Do you use any of the following: marijuana, speed, cocaine, heroin, hallucinogens or other drugs?: No   Is your blood type Rh negative?: No  Have you ever had abnormal antibodies in your blood?: No  Have you ever had asthma?: No  Have you ever had tuberculosis?: No  Do you have any allergies to drugs or over-the-counter medications?: (!) Yes(PCN)  Allergies: Dust Mites, Aspartame, Ethanol, Venlafaxine, Hydrochloride, Sertraline: No  Have you had any breast problems?: No  Have you ever ?: No  Have you had any gynecological surgical procedures such as cervical conization, a LEEP procedure, laser treatment, cryosurgery of the cervix or a dilation and curettage, etc?: No  Have you ever had any other surgical procedures?: (!) Yes(see surgical history)  Have you been hospitalized for a nonsurgical reason excluding normal delivery?: No  Have you ever had any anesthetic complications?: No  Have you ever had an abnormal pap smear?: (!)  Yes    Past Medical History (Continued)  Do you have a history of abnormalities of the uterus?: No  Did your mother take DRE or any other hormones when she was pregnant with you?: No  Did it take you more than a year to become pregnant?: No  Have you ever been evaluated or treated for infertility?: No  Is there a history of medical problems in your family, which you feel may be important to this pregnancy?: No  Do you have any other problems we have not asked about which you feel may be important to this pregnancy?: No    Symptoms since last menstrual period  Do you have any of the following symptoms: abdominal pain, blood in stools or urine, chest pain, shortness of breath, coughing or vomiting up blood, your heart racing or skipping beats, nausea and vomiting, pain on urination or vaginal discharge or bleed: (!) Yes(nausea, fatigue, scott)  Current medications, including over-the-counter medications, you are using? (If not applicable answer none): see med list  Will the patient be 35 years old or older at the time of delivery?: No    Has the patient, baby's father or anyone in either family had:  Thalassemia (Italian, Greek, Mediterranean or  background only) and an MCV result less than 80?: No  Neural tube defect such as meningomyelocele, spina bifida or anencephaly?: No  Congenital heart defect?: No  Down's Syndrome?: No  Tamir-Sachs disease (Voodoo, Cajun, Emirati-[place])?: No  Sickle cell disease or trait ()?: No  Hemophilia or other inherited problems of blood?: No  Muscular dystrophy?: No  Cystic fibrosis?: No  Bell's chorea?: No  Mental retardation/autism?: No  If yes, was the person tested for fragile X?: No  Any other inherited genetic or chromosomal disorder?: No  Maternal metabolic disorder (e.g Insulin-dependent diabetes, PKU)?: No  A child with birth defects not listed above?: No  Recurrent pregnancy loss or stillbirth?: No   Has the patient had any medications/street drugs/alcohol  since her last menstrual period?: (!) Yes(see med list)  Does the patient or baby's father have any other genetic risks?: No    Infection History   Do you object to being tested for Hepatitis B?: No  Do you object to being tested for HIV?: No   Do you feel that you are at high risk for coming in contact with the AIDS virus?: No  Have you ever been treated for tuberculosis?: No  Have you ever had a positive skin test for tuberculosis?: No  Do you live with someone who has tuberculosis?: No  Have you ever been exposed to tuberculosis?: No  Do you have genital herpes?: No  Does your partner have genital herpes?: (!) Yes  Have you had a viral illness since your last period?: No  Have you ever had gonorrhea, chlamydia, syphilis, venereal warts, trichomoniasis, pelvic inflammatory disease or any other sexually transmitted disease?: No  Do you know if you are a genital group B streptococcus carrier?: Unknown  Have you had chicken pox/varicella?: (!) Yes   Have you been vaccinated against chicken Pox?: No  Have you had any other infectious diseases?: (!) Yes([place])    Eda Rivera RN

## 2019-05-29 LAB
BACTERIA SPEC CULT: NORMAL
C TRACH DNA SPEC QL NAA+PROBE: NEGATIVE
HBV SURFACE AG SERPL QL IA: NONREACTIVE
HIV 1+2 AB+HIV1 P24 AG SERPL QL IA: NONREACTIVE
N GONORRHOEA DNA SPEC QL NAA+PROBE: NEGATIVE
RUBV IGG SERPL IA-ACNC: 24 IU/ML
SPECIMEN SOURCE: NORMAL
T PALLIDUM AB SER QL: NONREACTIVE

## 2019-05-31 ENCOUNTER — OFFICE VISIT (OUTPATIENT)
Dept: OBGYN | Facility: CLINIC | Age: 29
End: 2019-05-31
Payer: COMMERCIAL

## 2019-05-31 VITALS — HEIGHT: 64 IN | WEIGHT: 177 LBS | BODY MASS INDEX: 30.22 KG/M2

## 2019-05-31 DIAGNOSIS — O03.9 MISCARRIAGE: Primary | ICD-10-CM

## 2019-05-31 DIAGNOSIS — Z34.01 ENCOUNTER FOR SUPERVISION OF NORMAL FIRST PREGNANCY IN FIRST TRIMESTER: ICD-10-CM

## 2019-05-31 PROCEDURE — 99203 OFFICE O/P NEW LOW 30 MIN: CPT | Performed by: OBSTETRICS & GYNECOLOGY

## 2019-05-31 ASSESSMENT — MIFFLIN-ST. JEOR: SCORE: 1507.87

## 2019-05-31 NOTE — NURSING NOTE
"Chief Complaint   Patient presents with     Ultrasound     Non viable pregnancy on U/S       Initial Ht 1.626 m (5' 4\")   Wt 80.3 kg (177 lb)   LMP 2019 (Exact Date)   Breastfeeding? No   BMI 30.38 kg/m   Estimated body mass index is 30.38 kg/m  as calculated from the following:    Height as of this encounter: 1.626 m (5' 4\").    Weight as of this encounter: 80.3 kg (177 lb).  BP completed using cuff size: regular    Questioned patient about current smoking habits.  Pt. has never smoked.          The following HM Due: NONE    Qiana Patterson CMA      "

## 2019-05-31 NOTE — PROGRESS NOTES
Early Pregnancy Loss OBGYN Note  H&P    SUBJECTIVE:                                                   Christina Ornelas is a 28 year old female who presents to clinic today for newly diagnosed missed . LMP 3/31/2019. She is sure of this date based on phone application tracking. This was a desired and planned pregnancy. Based on LMP, patient should be 8w5d. She is measuring 7w6d by ultrasound, gestational sac reported to be 3.9 x 1.8 x 3.3cm with no yolk or embryo.  (Final Report pending).     Reassured her that the loss could not have been stopped by her actions or any other persons actions. She has not been experiencing any bleeding, pain or cramping.       Problem list and histories reviewed & adjusted, as indicated.  Additional history: as documented.    Patient Active Problem List   Diagnosis     Temporomandibular joint disorder     Attention deficit disorder     CARDIOVASCULAR SCREENING; LDL GOAL LESS THAN 160     Contraception     Dysmenorrhea     Major depressive disorder, single episode, mild (H)     Anxiety     Migraine with aura and without status migrainosus, not intractable     Obesity     Past Surgical History:   Procedure Laterality Date     HC TOOTH EXTRACTION W/FORCEP      wisdom teeth      SURGICAL HISTORY OF -       rt elbow surgery x2 for fracture      Social History     Tobacco Use     Smoking status: Former Smoker     Types: Cigarettes     Last attempt to quit: 2011     Years since quittin.0     Smokeless tobacco: Never Used   Substance Use Topics     Alcohol use: No     Comment: once A WEEK, 2-3 beers at times       Problem (# of Occurrences) Relation (Name,Age of Onset)    Arthritis (1) Maternal Grandmother    Cancer (1) Maternal Grandmother: ovarian cancer, dxed in her 60's -70's ?    Depression (1) Mother    Diabetes (1) Maternal Grandfather    Heart Disease (1) Maternal Grandfather: not sure about details    Hypertension (4) Mother, Father, Maternal Grandmother,  "Maternal Grandfather    Lipids (1) Mother       Negative family history of: Breast Cancer, Cancer - colorectal              Current Outpatient Medications on File Prior to Visit:  clindamycin (CLEOCIN-T) 1 % lotion Apply topically 2 times daily   fish oil-omega-3 fatty acids 1000 MG capsule Take 1 capsule (1 g) by mouth daily   Prenatal Vit-Fe Fumarate-FA (PRENATAL MULTIVITAMIN W/IRON) 27-0.8 MG tablet Take 1 tablet by mouth daily   citalopram (CELEXA) 20 MG tablet TK 1 T PO D   SUMAtriptan (IMITREX) 25 MG tablet Take 1-2 tablets (25-50 mg) by mouth at onset of headache for migraine May repeat dose in 2 hours.  Do not exceed 200 mg in 24 hours (Patient not taking: Reported on 2019)   traZODone (DESYREL) 50 MG tablet      No current facility-administered medications on file prior to visit.   Allergies   Allergen Reactions     Amoxicillin Rash     Penicillin [Penicillins] Hives       ROS:  12 point ROS negative except as noted above in HPI, including Gen., Resp., CV, GI &  system review.    OBJECTIVE:   Ht 1.626 m (5' 4\")   Wt 80.3 kg (177 lb)   LMP 2019 (Exact Date)   Breastfeeding? No   BMI 30.38 kg/m      Exam:  Constitutional:  Appearance: Well nourished, well developed alert, in no acute distress  Extremities: normal      In-Clinic Test Results:  No results found for this or any previous visit (from the past 24 hour(s)).    ASSESSMENT/PLAN:                                                        ICD-10-CM    1. Miscarriage O03.9    Missed  -- MSD >25mm with no embryo    Discussed miscarriages and expressed condolences. We discussed that pregnancy loss occurs in 10% of all clinically recognized pregnancies. The frequency increases with advanced maternal age. We discussed causes of miscarriage and that >50% are due to fetal chromosome abnormalities.  She understands that nothing can be done to prevent a miscarriage from occuring.     Discussed management options:     Expectant management. " This is a reasonable option for women in their 1st trimester. Discussed that in the absence of signs of infection, waiting 2-4 weeks is a reasonable option and that up to 80% of missed abortions will spontaneously pass by 8 weeks. Limited data that there are higher success rates in symptomatic women. Discussed unpredictable timing.     Medical management with misoprostol. Medical management decreases the time to expulsion  increases the rate of complete expulsion, and makes timing more predictable.  Discussed that about 70% will complete miscarriage within 3 days with a single dose and 84% of women will complete miscarriage with a second dose.       For both options above: Discussed what she could expect in terms of painful cramping and bleeding more significant than a period.     Surgical management. Offered and discussed suction dilation and curettage procedure. Counseled patient that surgical evacuation results in faster and more predictable complete evacuation compared to expectant and medical management. Discussed that it is an outpatient procedure, is associated with less blood loss than other 2 options, is more predictable in terms of planning, physically will be the most comfortable but does have risks of uterine perforation, cervical laceration, Asherman's syndrome, infection. With any procedure, hemorrhage and infection can occur. Plan to administer a single 200-mg dose of doxycycline orally 1 hour before surgical management of early pregnancy loss to prevent postoperative infection. Discussed that pregnancy tissue would be evaluated by pathology. She has concerns about insurance coverage and was encouraged to contact her insurance to see if it would be covered. Discussed that this could be scheduled tomorrow while I am on call if she desires. Patient wants to the surgery ASAP and desires to be scheduled tomorrow AM.        Patient desires surgical management with a Suction Dilation and Curretage.    Blood  type: B. Rhogam is not indicated.    Contraception plan: Desires to try for pregnancy. Will await one normal menses before trying to become pregnant again.       Will await final read of ultrasound prior to proceeding.   30 mins spent with Over 50% of this appointment was spent in counseling regarding missed  and its management.      Laura Oro DO  [facility]

## 2019-06-01 ENCOUNTER — ANESTHESIA (OUTPATIENT)
Dept: SURGERY | Facility: CLINIC | Age: 29
End: 2019-06-01

## 2019-06-01 ENCOUNTER — ANESTHESIA EVENT (OUTPATIENT)
Dept: SURGERY | Facility: CLINIC | Age: 29
End: 2019-06-01

## 2019-06-01 ENCOUNTER — NURSE TRIAGE (OUTPATIENT)
Dept: NURSING | Facility: CLINIC | Age: 29
End: 2019-06-01

## 2019-06-01 DIAGNOSIS — O03.9 MISCARRIAGE: Primary | ICD-10-CM

## 2019-06-01 RX ORDER — MISOPROSTOL 200 UG/1
800 TABLET ORAL EVERY 12 HOURS
Qty: 8 TABLET | Refills: 0 | Status: SHIPPED | OUTPATIENT
Start: 2019-06-01 | End: 2019-06-02

## 2019-06-01 NOTE — TELEPHONE ENCOUNTER
Patient wants the pill rather than surgery for miscarriage. I paged the on call HCP, 7:19 a.m. Dr Laura Oro,  to talk with the patient directly. I advised the patient to call back if they have not heard from the on call HCP within 30 minutes.  Juliet Suh RN-[facility]

## 2019-06-01 NOTE — PROGRESS NOTES
Patient called: She no longer wants to proceed with surgery this AM. She would like medical management.   Discussed the following with Bren regarding medical management with misoprostol. Medical management decreases the time to expulsion  increases the rate of complete expulsion, and makes timing more predictable.  Recommend 800mcg vaginally. Sublingual is also an option however this increases the chance of diarrhea. May repeat 12 hours later (no earlier than 3 hours, no later than 7 days). Discussed that about 70% will complete miscarriage within 3 days with a single dose and 85% of women will complete miscarriage with a second dose.  Discussed what to expect in terms of painful cramping and bleeding more significant than a period. She should expect 2 hours of heavy bleeding, passing tissue and clots. Recommend ibuprofen 800mg q6h for pain and Tylenol. Recommend another adult is home with her when she is bleeding and miscarrying. Counseled patient to seek medical attention if heavy bleeding (overflowing two large pads an hour) persists beyond 2 hours, if severe pain, if fever/chills or foul smelling discharge, or if syncopal.  Discussed that suction D&C may need to be performed if incomplete miscarriage or if excessive bleeding with either expectant management or misoprostol. A follow up ultrasound can be performed to document absence of the gestational sac, although this is not required. Alternatively, following serum HCG levels can be performed. Patient reported symptoms is also appropriate with follow-up urine pregnancy test at home in 2-3 weeks.     Rx sent. Surgery cancelled. HCG levels ordered. Perform Monday or Tues and repeat in one week.    Laura Oro, DO  OB/GYN

## 2019-06-01 NOTE — ANESTHESIA PREPROCEDURE EVALUATION
Anesthesia Pre-Procedure Evaluation    Patient: Christina Ornelas   MRN: [id number] : 1990          Preoperative Diagnosis: missed AB    Procedure(s):  DILATION AND CURETTAGE, UTERUS, USING SUCTION    Past Medical History:   Diagnosis Date     Infectious mononucleosis      LSIL (low grade squamous intraepithelial lesion) on Pap smear 2007    colp - WNL     Migraines      NONSPECIFIC MEDICAL HISTORY     rt elbow fracturem snowboarding     NONSPECIFIC MEDICAL HISTORY 9 yrs old    bilat wrist fractures due to injury     Past Surgical History:   Procedure Laterality Date     HC TOOTH EXTRACTION W/FORCEP      wisdom teeth      SURGICAL HISTORY OF -       rt elbow surgery x2 for fracture     Anesthesia Evaluation     . Pt has had prior anesthetic. Type: General    No history of anesthetic complications          ROS/MED HX    ENT/Pulmonary:  - neg pulmonary ROS     Neurologic:     (+)migraines,     Cardiovascular:  - neg cardiovascular ROS       METS/Exercise Tolerance:     Hematologic:  - neg hematologic  ROS       Musculoskeletal:  - neg musculoskeletal ROS       GI/Hepatic:  - neg GI/hepatic ROS       Renal/Genitourinary:  - ROS Renal section negative       Endo: Comment: Class 1    (+) Obesity, .      Psychiatric:     (+) psychiatric history anxiety and depression      Infectious Disease:  - neg infectious disease ROS       Malignancy:      - no malignancy   Other: Comment: Missed Ab   (+) Possibly pregnant                         Physical Exam  Normal systems: cardiovascular, pulmonary and dental    Airway   Mallampati: II  TM distance: >3 FB  Neck ROM: full    Dental     Cardiovascular   Rhythm and rate: regular and normal      Pulmonary    breath sounds clear to auscultation    Other findings: Lab Test        19                       1125          0912          1211          WBC          11.1*        8.3          7.2           HGB          13.5         13.9        "  13.1          MCV          87           87           87            PLT          168          162          156            Lab Test        04/17/17 11/14/11                       0912          1211          NA           141           --           POTASSIUM    3.7           --           CHLORIDE     107           --           CO2          27            --           BUN          10            --           CR           0.77          --           ANIONGAP     7             --           RANDAL          9.3           --           GLC          96           86                        Lab Results   Component Value Date    WBC 11.1 (H) 05/28/2019    HGB 13.5 05/28/2019    HCT 37.8 05/28/2019     05/28/2019     04/17/2017    POTASSIUM 3.7 04/17/2017    CHLORIDE 107 04/17/2017    CO2 27 04/17/2017    BUN 10 04/17/2017    CR 0.77 04/17/2017    GLC 96 04/17/2017    RANDAL 9.3 04/17/2017    TSH 1.06 11/14/2011       Preop Vitals  BP Readings from Last 3 Encounters:   05/28/19 120/62   11/07/18 112/60   09/28/18 123/77    Pulse Readings from Last 3 Encounters:   05/28/19 76   11/07/18 75   09/28/18 95      Resp Readings from Last 3 Encounters:   05/19/17 20   04/17/17 16    SpO2 Readings from Last 3 Encounters:   11/07/18 99%   05/11/18 97%   03/03/14 97%      Temp Readings from Last 1 Encounters:   11/07/18 98.9  F (37.2  C) (Tympanic)    Ht Readings from Last 1 Encounters:   05/31/19 1.626 m (5' 4\")      Wt Readings from Last 1 Encounters:   05/31/19 80.3 kg (177 lb)    Estimated body mass index is 30.38 kg/m  as calculated from the following:    Height as of this encounter: 1.626 m (5' 4\").    Weight as of this encounter: 80.3 kg (177 lb).       Anesthesia Plan      History & Physical Review  History and physical reviewed and following examination; no interval change.    ASA Status:  2 .    NPO Status:  > 8 hours    Plan for General and LMA with Intravenous induction. Maintenance will be Balanced.    PONV prophylaxis:  " Ondansetron (or other 5HT-3) and Dexamethasone or Solumedrol       Postoperative Care  Postoperative pain management:  IV analgesics, Oral pain medications and Multi-modal analgesia.      Consents  Anesthetic plan, risks, benefits and alternatives discussed with:  Patient.  Use of blood products discussed: No .   .                 Jonathan Koch MD                    .

## 2019-06-03 NOTE — RESULT ENCOUNTER NOTE
Results discussed directly with patient in clinic. Further details documented in the note.     Homa Roblero MD

## 2019-06-04 DIAGNOSIS — O03.9 MISCARRIAGE: ICD-10-CM

## 2019-06-04 LAB — B-HCG SERPL-ACNC: 4231 IU/L (ref 0–5)

## 2019-06-04 PROCEDURE — 84702 CHORIONIC GONADOTROPIN TEST: CPT | Performed by: OBSTETRICS & GYNECOLOGY

## 2019-06-04 PROCEDURE — 36415 COLL VENOUS BLD VENIPUNCTURE: CPT | Performed by: OBSTETRICS & GYNECOLOGY

## 2019-06-11 DIAGNOSIS — O03.9 MISCARRIAGE: ICD-10-CM

## 2019-06-11 LAB — B-HCG SERPL-ACNC: 686 IU/L (ref 0–5)

## 2019-06-11 PROCEDURE — 36415 COLL VENOUS BLD VENIPUNCTURE: CPT | Performed by: OBSTETRICS & GYNECOLOGY

## 2019-06-11 PROCEDURE — 84702 CHORIONIC GONADOTROPIN TEST: CPT | Performed by: OBSTETRICS & GYNECOLOGY

## 2019-06-13 DIAGNOSIS — O03.9 MISCARRIAGE: Primary | ICD-10-CM

## 2019-06-20 DIAGNOSIS — O03.9 MISCARRIAGE: ICD-10-CM

## 2019-06-20 DIAGNOSIS — O03.9 MISCARRIAGE: Primary | ICD-10-CM

## 2019-06-20 LAB — B-HCG SERPL-ACNC: 149 IU/L (ref 0–5)

## 2019-06-20 PROCEDURE — 84702 CHORIONIC GONADOTROPIN TEST: CPT | Performed by: OBSTETRICS & GYNECOLOGY

## 2019-06-20 PROCEDURE — 36415 COLL VENOUS BLD VENIPUNCTURE: CPT | Performed by: OBSTETRICS & GYNECOLOGY

## 2019-07-01 DIAGNOSIS — O03.9 MISCARRIAGE: Primary | ICD-10-CM

## 2019-07-01 DIAGNOSIS — O03.9 MISCARRIAGE: ICD-10-CM

## 2019-07-01 LAB — B-HCG SERPL-ACNC: 39 IU/L (ref 0–5)

## 2019-07-01 PROCEDURE — 36415 COLL VENOUS BLD VENIPUNCTURE: CPT | Performed by: OBSTETRICS & GYNECOLOGY

## 2019-07-01 PROCEDURE — 84702 CHORIONIC GONADOTROPIN TEST: CPT | Performed by: OBSTETRICS & GYNECOLOGY

## 2019-07-08 DIAGNOSIS — O03.9 MISCARRIAGE: ICD-10-CM

## 2019-07-08 DIAGNOSIS — O03.4 INCOMPLETE ABORTION: Primary | ICD-10-CM

## 2019-07-08 LAB — B-HCG SERPL-ACNC: 10 IU/L (ref 0–5)

## 2019-07-08 PROCEDURE — 84702 CHORIONIC GONADOTROPIN TEST: CPT | Performed by: OBSTETRICS & GYNECOLOGY

## 2019-07-08 PROCEDURE — 36415 COLL VENOUS BLD VENIPUNCTURE: CPT | Performed by: OBSTETRICS & GYNECOLOGY

## 2019-07-08 NOTE — RESULT ENCOUNTER NOTE
In reviewing the patient's chart it appears as though this patient has had a spontaneous AB.  She is blood group be Rh+.  Please ask her to repeat a quantitative beta-hCG in 1 week and to abstain from intercourse until her quantitative beta hCGs have returned to negative.  Please ask her to call for any heavy bleeding pain or symptoms of concern  Andrei So MD FACOG

## 2019-07-19 DIAGNOSIS — O03.4 INCOMPLETE ABORTION: ICD-10-CM

## 2019-07-19 LAB — B-HCG SERPL-ACNC: 2 IU/L (ref 0–5)

## 2019-07-19 PROCEDURE — 36415 COLL VENOUS BLD VENIPUNCTURE: CPT | Performed by: OBSTETRICS & GYNECOLOGY

## 2019-07-19 PROCEDURE — 84702 CHORIONIC GONADOTROPIN TEST: CPT | Performed by: OBSTETRICS & GYNECOLOGY

## 2019-07-21 NOTE — RESULT ENCOUNTER NOTE
Natali, all your results are within normal limits.  We can accept the most recent quant B HCG level as negative.  Please let us know if you have any questions  Andrei So M.D.

## 2019-09-30 ENCOUNTER — HEALTH MAINTENANCE LETTER (OUTPATIENT)
Age: 29
End: 2019-09-30

## 2020-01-14 ENCOUNTER — OFFICE VISIT (OUTPATIENT)
Dept: PEDIATRICS | Facility: CLINIC | Age: 30
End: 2020-01-14
Payer: COMMERCIAL

## 2020-01-14 VITALS
RESPIRATION RATE: 12 BRPM | OXYGEN SATURATION: 99 % | TEMPERATURE: 98.1 F | WEIGHT: 173.4 LBS | DIASTOLIC BLOOD PRESSURE: 68 MMHG | SYSTOLIC BLOOD PRESSURE: 112 MMHG | HEART RATE: 94 BPM | BODY MASS INDEX: 29.6 KG/M2 | HEIGHT: 64 IN

## 2020-01-14 DIAGNOSIS — Z00.00 ROUTINE GENERAL MEDICAL EXAMINATION AT A HEALTH CARE FACILITY: Primary | ICD-10-CM

## 2020-01-14 DIAGNOSIS — Z23 NEED FOR PROPHYLACTIC VACCINATION AND INOCULATION AGAINST INFLUENZA: ICD-10-CM

## 2020-01-14 DIAGNOSIS — F32.0 MAJOR DEPRESSIVE DISORDER, SINGLE EPISODE, MILD (H): ICD-10-CM

## 2020-01-14 DIAGNOSIS — N97.9 FEMALE INFERTILITY: ICD-10-CM

## 2020-01-14 DIAGNOSIS — G43.109 MIGRAINE WITH AURA AND WITHOUT STATUS MIGRAINOSUS, NOT INTRACTABLE: ICD-10-CM

## 2020-01-14 DIAGNOSIS — N94.6 DYSMENORRHEA: ICD-10-CM

## 2020-01-14 PROCEDURE — 99395 PREV VISIT EST AGE 18-39: CPT | Mod: 25 | Performed by: NURSE PRACTITIONER

## 2020-01-14 PROCEDURE — 36415 COLL VENOUS BLD VENIPUNCTURE: CPT | Performed by: NURSE PRACTITIONER

## 2020-01-14 PROCEDURE — 80048 BASIC METABOLIC PNL TOTAL CA: CPT | Performed by: NURSE PRACTITIONER

## 2020-01-14 PROCEDURE — 90686 IIV4 VACC NO PRSV 0.5 ML IM: CPT | Performed by: NURSE PRACTITIONER

## 2020-01-14 PROCEDURE — 90471 IMMUNIZATION ADMIN: CPT | Performed by: NURSE PRACTITIONER

## 2020-01-14 PROCEDURE — 84443 ASSAY THYROID STIM HORMONE: CPT | Performed by: NURSE PRACTITIONER

## 2020-01-14 SDOH — HEALTH STABILITY: MENTAL HEALTH: HOW MANY STANDARD DRINKS CONTAINING ALCOHOL DO YOU HAVE ON A TYPICAL DAY?: 1 OR 2

## 2020-01-14 SDOH — HEALTH STABILITY: MENTAL HEALTH: HOW OFTEN DO YOU HAVE A DRINK CONTAINING ALCOHOL?: MONTHLY OR LESS

## 2020-01-14 ASSESSMENT — PATIENT HEALTH QUESTIONNAIRE - PHQ9
5. POOR APPETITE OR OVEREATING: NOT AT ALL
SUM OF ALL RESPONSES TO PHQ QUESTIONS 1-9: 2

## 2020-01-14 ASSESSMENT — ANXIETY QUESTIONNAIRES
7. FEELING AFRAID AS IF SOMETHING AWFUL MIGHT HAPPEN: NOT AT ALL
5. BEING SO RESTLESS THAT IT IS HARD TO SIT STILL: NOT AT ALL
IF YOU CHECKED OFF ANY PROBLEMS ON THIS QUESTIONNAIRE, HOW DIFFICULT HAVE THESE PROBLEMS MADE IT FOR YOU TO DO YOUR WORK, TAKE CARE OF THINGS AT HOME, OR GET ALONG WITH OTHER PEOPLE: NOT DIFFICULT AT ALL
6. BECOMING EASILY ANNOYED OR IRRITABLE: NOT AT ALL
GAD7 TOTAL SCORE: 0
1. FEELING NERVOUS, ANXIOUS, OR ON EDGE: NOT AT ALL
2. NOT BEING ABLE TO STOP OR CONTROL WORRYING: NOT AT ALL
3. WORRYING TOO MUCH ABOUT DIFFERENT THINGS: NOT AT ALL

## 2020-01-14 ASSESSMENT — ENCOUNTER SYMPTOMS
NAUSEA: 0
DIARRHEA: 0
DIZZINESS: 0
PARESTHESIAS: 0
CONSTIPATION: 0
HEMATURIA: 0
PALPITATIONS: 0
CHILLS: 0
SORE THROAT: 0
MYALGIAS: 0
DYSURIA: 0
JOINT SWELLING: 0
SHORTNESS OF BREATH: 0
FEVER: 0
ABDOMINAL PAIN: 0
FREQUENCY: 0
WEAKNESS: 0
ARTHRALGIAS: 0
HEMATOCHEZIA: 0
HEADACHES: 0
EYE PAIN: 0
NERVOUS/ANXIOUS: 0
HEARTBURN: 0
COUGH: 0

## 2020-01-14 ASSESSMENT — MIFFLIN-ST. JEOR: SCORE: 1486.54

## 2020-01-14 NOTE — PROGRESS NOTES
SUBJECTIVE:   CC: Christina Ornelas is an 29 year old woman who presents for preventive health visit.     Healthy Habits:     Getting at least 3 servings of Calcium per day:  Yes    Bi-annual eye exam:  Yes    Dental care twice a year:  Yes    Sleep apnea or symptoms of sleep apnea:  None    Diet:  Regular (no restrictions)    Frequency of exercise:  1 day/week    Duration of exercise:  Less than 15 minutes    Taking medications regularly:  Yes    Medication side effects:  Not applicable and None    PHQ-2 Total Score: 0    Additional concerns today:  Yes    She states her and her  having been trying to conceive for 2 years  IUD removed 18  She miscarried at 8w5d 19  No other pregnancies  She is frustrating as they are hoping to conceive  She tracks ovulation and participates in groups online  She is taking a prenatal  No drug use  No family history of infertility or PCOS  Mom tried for 1 year before she got pregnant with her younger brother   She is hoping I will prescribe clomid  She has never been prescribed this medication before    She has a past history of MDD and anxiety  Was on celexa, has not been on it for over 1 year  Currently in remission    PMH of migraines. Has been over 1 year since last migraine        Today's PHQ-2 Score:   PHQ-2 (  [employer]) 2020   Q1: Little interest or pleasure in doing things 0   Q2: Feeling down, depressed or hopeless 0   PHQ-2 Score 0   Q1: Little interest or pleasure in doing things Not at all   Q2: Feeling down, depressed or hopeless Not at all   PHQ-2 Score 0       Abuse: Current or Past(Physical, Sexual or Emotional)- No  Do you feel safe in your environment? Yes        Social History     Tobacco Use     Smoking status: Former Smoker     Types: Cigarettes     Last attempt to quit: 2011     Years since quittin.6     Smokeless tobacco: Never Used   Substance Use Topics     Alcohol use: Yes     Frequency: Monthly or less     Drinks per  session: 1 or 2     If you drink alcohol do you typically have >3 drinks per day or >7 drinks per week? No    Alcohol Use 2020   Prescreen: >3 drinks/day or >7 drinks/week? No   Prescreen: >3 drinks/day or >7 drinks/week? -       Reviewed orders with patient.  Reviewed health maintenance and updated orders accordingly - Yes  Lab work is in process  Labs reviewed in EPIC  BP Readings from Last 3 Encounters:   20 112/68   19 120/62   18 112/60    Wt Readings from Last 3 Encounters:   20 78.7 kg (173 lb 6.4 oz)   19 80.3 kg (177 lb)   19 80.3 kg (177 lb)                  Patient Active Problem List   Diagnosis     Temporomandibular joint disorder     Attention deficit disorder     CARDIOVASCULAR SCREENING; LDL GOAL LESS THAN 160     Contraception     Dysmenorrhea     Major depressive disorder, single episode, mild (H)     Anxiety     Migraine with aura and without status migrainosus, not intractable     Obesity     Past Surgical History:   Procedure Laterality Date     HC TOOTH EXTRACTION W/FORCEP      wisdom teeth      SURGICAL HISTORY OF -       rt elbow surgery x2 for fracture       Social History     Tobacco Use     Smoking status: Former Smoker     Types: Cigarettes     Last attempt to quit: 2011     Years since quittin.6     Smokeless tobacco: Never Used   Substance Use Topics     Alcohol use: Yes     Frequency: Monthly or less     Drinks per session: 1 or 2     Family History   Problem Relation Age of Onset     Hypertension Mother      Lipids Mother      Depression Mother      Hypertension Father      Diabetes Father         diet controlled after weight loss     Liver Disease Father         non-alcoholic fatty liver     Cancer Maternal Grandmother         ovarian cancer, dxed in her 60's -70's ?     Hypertension Maternal Grandmother      Arthritis Maternal Grandmother      Diabetes Maternal Grandfather      Hypertension Maternal Grandfather      Heart Disease  "Maternal Grandfather         not sure about details     Breast Cancer No family hx of      Cancer - colorectal No family hx of            Mammogram not appropriate for this patient based on age.    Pertinent mammograms are reviewed under the imaging tab.  History of abnormal Pap smear: NO - age 30- 65 PAP every 3 years recommended  PAP / HPV 5/11/2018 3/3/2014 12/31/2012   PAP NIL NIL NIL     Reviewed and updated as needed this visit by clinical staff  Tobacco  Allergies  Meds  Soc Hx        Reviewed and updated as needed this visit by Provider        Past Medical History:   Diagnosis Date     Infectious mononucleosis 5/06     LSIL (low grade squamous intraepithelial lesion) on Pap smear 6/2007    colp - WNL     Migraines      NONSPECIFIC MEDICAL HISTORY     rt elbow fracturem snowboarding     NONSPECIFIC MEDICAL HISTORY 9 yrs old    bilat wrist fractures due to injury        Review of Systems   Constitutional: Negative for chills and fever.   HENT: Negative for congestion, ear pain, hearing loss and sore throat.    Eyes: Negative for pain and visual disturbance.   Respiratory: Negative for cough and shortness of breath.    Cardiovascular: Negative for chest pain, palpitations and peripheral edema.   Gastrointestinal: Negative for abdominal pain, constipation, diarrhea, heartburn, hematochezia and nausea.   Genitourinary: Negative for dysuria, frequency, genital sores, hematuria and urgency.   Musculoskeletal: Negative for arthralgias, joint swelling and myalgias.   Skin: Negative for rash.   Neurological: Negative for dizziness, weakness, headaches and paresthesias.   Psychiatric/Behavioral: Negative for mood changes. The patient is not nervous/anxious.         OBJECTIVE:   /68 (BP Location: Right arm, Patient Position: Chair, Cuff Size: Adult Regular)   Pulse 94   Temp 98.1  F (36.7  C) (Oral)   Resp 12   Ht 1.626 m (5' 4\")   Wt 78.7 kg (173 lb 6.4 oz)   LMP 03/31/2019 (Exact Date)   SpO2 99%   " BMI 29.76 kg/m    Physical Exam  GENERAL: healthy, alert and no distress  EYES: Eyes grossly normal to inspection, PERRL and conjunctivae and sclerae normal  HENT: ear canals and TM's normal, nose and mouth without ulcers or lesions  NECK: no adenopathy, no asymmetry, masses, or scars and thyroid normal to palpation  RESP: lungs clear to auscultation - no rales, rhonchi or wheezes  BREAST: normal without masses, tenderness or nipple discharge and no palpable axillary masses or adenopathy  CV: regular rate and rhythm, normal S1 S2, no S3 or S4, no murmur, click or rub, no peripheral edema and peripheral pulses strong  ABDOMEN: soft, nontender, no hepatosplenomegaly, no masses and bowel sounds normal  MS: no gross musculoskeletal defects noted, no edema  SKIN: no suspicious lesions or rashes  NEURO: Normal strength and tone, mentation intact and speech normal  PSYCH: mentation appears normal, affect normal/bright    Diagnostic Test Results:  Labs reviewed in Epic    ASSESSMENT/PLAN:   1. Routine general medical examination at a health care facility  - Basic metabolic panel  - TSH with free T4 reflex    2. Need for prophylactic vaccination and inoculation against influenza  - INFLUENZA VACCINE IM > 6 MONTHS VALENT IIV4 [00534]  - Vaccine Administration, Initial [27101]    3. Migraine with aura and without status migrainosus, not intractable  - Stable    4. Dysmenorrhea    5. Major depressive disorder, single episode, mild (H)  - Stable. Currently in remission    6. Female infertility  - Needs to see OB or midwife. She was initially upset about this as she wanted me to prescribe clomid. We discussed indication for clomid and that she has not had any testing regarding ovulation and that further insight would come from OB. She later understood  - OB/GYN REFERRAL    COUNSELING:  Reviewed preventive health counseling, as reflected in patient instructions       Regular exercise       Healthy diet/nutrition        "Immunizations    Vaccinated for: Influenza             Family planning    Estimated body mass index is 29.76 kg/m  as calculated from the following:    Height as of this encounter: 1.626 m (5' 4\").    Weight as of this encounter: 78.7 kg (173 lb 6.4 oz).         reports that she quit smoking about 8 years ago. Her smoking use included cigarettes. She has never used smokeless tobacco.      Counseling Resources:  ATP IV Guidelines  Pooled Cohorts Equation Calculator  Breast Cancer Risk Calculator  FRAX Risk Assessment  ICSI Preventive Guidelines  Dietary Guidelines for Americans, 2010  USDA's MyPlate  ASA Prophylaxis  Lung CA Screening    BON[facility]  "

## 2020-01-14 NOTE — LETTER
My Migraine Action Plan      Date: 1/14/2020     My Name: Christina Ornelas   YOB: 1990  My Pharmacy:    [facility] [place] [place] - [address] [id number] - [place], [place] - [address]  AT [address]     My Preventive Medicine(s):  none  My Rescue Medicine(s):  imitrex My Doctor: Aaseby-Aguilera, Ramona Ann     My Clinic: [facility] ([facility]  [address]  [address]  [place] [id number] [phone]        GREEN ZONE = Good Control    My headache plan is working.   I can do what I need to do.         I WILL:     ? Keep managing my triggers.  ? Write in my migraine diary each time I have a headache.  ? Keep taking my preventive medicine daily.  ? Take my rescue medicine as needed.             YELLOW ZONE = Not Enough Control    My headache plan isn t always working.   My headaches keep me from doing   some of the things I need to do.   I WILL:     ? Set goals to control my triggers and act on them.  ? Write in my migraine diary each time I have a headache and review it for                     patterns or new triggers.  ? Keep taking my preventive medicine daily.  ? Take my rescue medicine as needed.  l Make sure I stay hydrated.  ? Call my provider or clinic if my rescue medication did not help after taking it on             at least two separate headache days  ? Call my provider or clinic if I need to use my rescue medicine more than an                  average of 2 times per week over the course of one month.               RED ZONE = Poor or No Control    My headache plan has  failed. I can t do anything  when I have one. My  medicines aren t working.   I WILL:   ? Keep taking my preventive medicine daily.  ? Make sure I stay hydrated.  ? Call my provider or clinic if my medicines are not helping or if I am not able to              keep fluids down because of nausea.  ? Let my provider or clinic know within  2 weeks if I have gone to an urgent care or          emergency department.          Provider specific instructions:      Do not take over the counter pain relievers more than 14 days per month. This includes NSAIDS (Ibuprofen, Motrin, Advil, Naproxen, Aleve, etc), acetaminophen (Tylenol), aspirin, and Excedrin.     If you use a triptan medicine (sumatriptan, rizatriptan, frovatriptan, zolmitriptan, eletriptan etc) take it as soon as you notice the migraine starting. You can take a second dose 2 hours after the first dose if you still have any migraine symptoms.    It is fine to take 600 mg of ibuprofen (Advil) or 440 mg of naproxen (Aleve) with the triptan medicine.  Try not to use triptan medicines more than 2 days a week.    If you use your rescue medicine more than 2 times per week over the course of 1 month, set up an appointment with your provider to talk about starting a medication to prevent migraines.               Other Things I Can Do to Help My Migraines   Track Migraines and Triggers     Keep a headache journal of your symptoms. Try to track how often you have a headache, how long it lasts and what medicines you used. You can also track severity of headache.    You can use a smart phone adis: My Migraine [place]. The information that you track in the adis can be uploaded for your provider through [employer].     You can also track your migraines on paper. The clinic can print a copy of the Migraine Diary for you. Link: [email or web address]      Lifestyle Changes 1. Try to drink enough water each day (48-70 fluid ounces a day)  2. Limit caffeine intact-one caffeinated beverage a day  3. Eat regular meals  4. Try to get cardiovascular exercise (walking, swimming, biking) 4-5 times a week  5. Try to have a routine sleep schedule going to bed at the same time each night and getting up the same time each morning with enough time to sleep in between  6. Sometimes foods can trigger migraines you can try to  eliminate them if you think they make symptoms worse: dairy, gluten, nuts (cashews, almonds), artificial sweeteners, artificial colors, high sugar content foods, certain alcohol, chocolate, and preservatives like MSG and nitrates.      Other Therapies  Talk to your doctor about adding other therapies to your headache treatment plan including:   - Cognitive behavioral therapy  - Biofeedback  - Practice therapeutic techniques at home such as Progressive Relaxation and Deep Breathing    Research suggests that combining one or more of these therapies with medication can be helpful to reduce the number and severity of migraines.     Learn More To learn more about headaches you can go to the following websites:  https://americanmigrainefoundation.org/   http://www.headaches.org/

## 2020-01-15 LAB
ANION GAP SERPL CALCULATED.3IONS-SCNC: 9 MMOL/L (ref 3–14)
BUN SERPL-MCNC: 11 MG/DL (ref 7–30)
CALCIUM SERPL-MCNC: 9.4 MG/DL (ref 8.5–10.1)
CHLORIDE SERPL-SCNC: 108 MMOL/L (ref 94–109)
CO2 SERPL-SCNC: 22 MMOL/L (ref 20–32)
CREAT SERPL-MCNC: 0.74 MG/DL (ref 0.52–1.04)
GFR SERPL CREATININE-BSD FRML MDRD: >90 ML/MIN/{1.73_M2}
GLUCOSE SERPL-MCNC: 91 MG/DL (ref 70–99)
POTASSIUM SERPL-SCNC: 3.8 MMOL/L (ref 3.4–5.3)
SODIUM SERPL-SCNC: 139 MMOL/L (ref 133–144)
TSH SERPL DL<=0.005 MIU/L-ACNC: 1.41 MU/L (ref 0.4–4)

## 2020-01-15 ASSESSMENT — ANXIETY QUESTIONNAIRES: GAD7 TOTAL SCORE: 0

## 2020-01-16 NOTE — RESULT ENCOUNTER NOTE
Christina Ornelas,    Your lab results have been released to [employer].   Your labs look good. Thyroid function and basic metabolic panel (looks at kidney function and electrolytes) are within normal ranges. It was nice meeting you in clinic. I recommend scheduling with OB (or midwife) as we discussed at your visit.     Please call the clinic if you have any concerns [phone]    [facility]

## 2020-04-22 LAB
HBV SURFACE AG SERPL QL IA: NEGATIVE
HIV 1+2 AB+HIV1 P24 AG SERPL QL IA: NONREACTIVE
RUBELLA ANTIBODY IGG QUANTITATIVE: NORMAL IU/ML
TREPONEMA ANTIBODIES: NONREACTIVE

## 2020-10-23 LAB — GROUP B STREP PCR: NEGATIVE

## 2020-11-11 ENCOUNTER — ANESTHESIA EVENT (OUTPATIENT)
Dept: OBGYN | Facility: CLINIC | Age: 30
End: 2020-11-11
Payer: COMMERCIAL

## 2020-11-11 ENCOUNTER — HOSPITAL ENCOUNTER (INPATIENT)
Facility: CLINIC | Age: 30
LOS: 2 days | Discharge: HOME HEALTH | End: 2020-11-13
Attending: OBSTETRICS & GYNECOLOGY | Admitting: OBSTETRICS & GYNECOLOGY
Payer: COMMERCIAL

## 2020-11-11 ENCOUNTER — ANESTHESIA (OUTPATIENT)
Dept: OBGYN | Facility: CLINIC | Age: 30
End: 2020-11-11
Payer: COMMERCIAL

## 2020-11-11 LAB
ABO + RH BLD: NORMAL
ABO + RH BLD: NORMAL
ALBUMIN UR-MCNC: 20 MG/DL
ALT SERPL W P-5'-P-CCNC: 15 U/L (ref 0–50)
ANION GAP SERPL CALCULATED.3IONS-SCNC: 11 MMOL/L (ref 3–14)
APPEARANCE UR: ABNORMAL
AST SERPL W P-5'-P-CCNC: 19 U/L (ref 0–45)
BACTERIA #/AREA URNS HPF: ABNORMAL /HPF
BILIRUB UR QL STRIP: NEGATIVE
BLD GP AB SCN SERPL QL: NORMAL
BLOOD BANK CMNT PATIENT-IMP: NORMAL
BUN SERPL-MCNC: 9 MG/DL (ref 7–30)
CALCIUM SERPL-MCNC: 8.8 MG/DL (ref 8.5–10.1)
CHLORIDE SERPL-SCNC: 107 MMOL/L (ref 94–109)
CO2 SERPL-SCNC: 20 MMOL/L (ref 20–32)
COLOR UR AUTO: YELLOW
CREAT SERPL-MCNC: 0.58 MG/DL (ref 0.52–1.04)
CREAT UR-MCNC: 95 MG/DL
ERYTHROCYTE [DISTWIDTH] IN BLOOD BY AUTOMATED COUNT: 13.2 % (ref 10–15)
GFR SERPL CREATININE-BSD FRML MDRD: >90 ML/MIN/{1.73_M2}
GLUCOSE SERPL-MCNC: 86 MG/DL (ref 70–99)
GLUCOSE UR STRIP-MCNC: NEGATIVE MG/DL
HCT VFR BLD AUTO: 34.2 % (ref 35–47)
HGB BLD-MCNC: 11.3 G/DL (ref 11.7–15.7)
HGB UR QL STRIP: ABNORMAL
KETONES UR STRIP-MCNC: NEGATIVE MG/DL
LABORATORY COMMENT REPORT: NORMAL
LEUKOCYTE ESTERASE UR QL STRIP: ABNORMAL
MCH RBC QN AUTO: 29.9 PG (ref 26.5–33)
MCHC RBC AUTO-ENTMCNC: 33 G/DL (ref 31.5–36.5)
MCV RBC AUTO: 91 FL (ref 78–100)
MUCOUS THREADS #/AREA URNS LPF: PRESENT /LPF
NITRATE UR QL: NEGATIVE
PH UR STRIP: 6 PH (ref 5–7)
PLATELET # BLD AUTO: 134 10E9/L (ref 150–450)
POTASSIUM SERPL-SCNC: 3.8 MMOL/L (ref 3.4–5.3)
PROT UR-MCNC: 0.27 G/L
PROT/CREAT 24H UR: 0.29 G/G CR (ref 0–0.2)
RBC # BLD AUTO: 3.78 10E12/L (ref 3.8–5.2)
RBC #/AREA URNS AUTO: 120 /HPF (ref 0–2)
SARS-COV-2 RNA SPEC QL NAA+PROBE: NEGATIVE
SARS-COV-2 RNA SPEC QL NAA+PROBE: NORMAL
SODIUM SERPL-SCNC: 138 MMOL/L (ref 133–144)
SOURCE: ABNORMAL
SP GR UR STRIP: 1.02 (ref 1–1.03)
SPECIMEN EXP DATE BLD: NORMAL
SPECIMEN SOURCE: NORMAL
SPECIMEN SOURCE: NORMAL
SQUAMOUS #/AREA URNS AUTO: 15 /HPF (ref 0–1)
URATE SERPL-MCNC: 4.2 MG/DL (ref 2.6–6)
UROBILINOGEN UR STRIP-MCNC: NORMAL MG/DL (ref 0–2)
WBC # BLD AUTO: 9.4 10E9/L (ref 4–11)
WBC #/AREA URNS AUTO: 19 /HPF (ref 0–5)

## 2020-11-11 PROCEDURE — 370N000003 HC ANESTHESIA WARD SERVICE

## 2020-11-11 PROCEDURE — 81001 URINALYSIS AUTO W/SCOPE: CPT | Performed by: OBSTETRICS & GYNECOLOGY

## 2020-11-11 PROCEDURE — 0KQM0ZZ REPAIR PERINEUM MUSCLE, OPEN APPROACH: ICD-10-PCS | Performed by: OBSTETRICS & GYNECOLOGY

## 2020-11-11 PROCEDURE — 84550 ASSAY OF BLOOD/URIC ACID: CPT | Performed by: OBSTETRICS & GYNECOLOGY

## 2020-11-11 PROCEDURE — 00HU33Z INSERTION OF INFUSION DEVICE INTO SPINAL CANAL, PERCUTANEOUS APPROACH: ICD-10-PCS | Performed by: OBSTETRICS & GYNECOLOGY

## 2020-11-11 PROCEDURE — 3E0P7VZ INTRODUCTION OF HORMONE INTO FEMALE REPRODUCTIVE, VIA NATURAL OR ARTIFICIAL OPENING: ICD-10-PCS | Performed by: OBSTETRICS & GYNECOLOGY

## 2020-11-11 PROCEDURE — 80048 BASIC METABOLIC PNL TOTAL CA: CPT | Performed by: OBSTETRICS & GYNECOLOGY

## 2020-11-11 PROCEDURE — 87086 URINE CULTURE/COLONY COUNT: CPT | Performed by: OBSTETRICS & GYNECOLOGY

## 2020-11-11 PROCEDURE — 86780 TREPONEMA PALLIDUM: CPT | Performed by: OBSTETRICS & GYNECOLOGY

## 2020-11-11 PROCEDURE — 85027 COMPLETE CBC AUTOMATED: CPT | Performed by: OBSTETRICS & GYNECOLOGY

## 2020-11-11 PROCEDURE — 86901 BLOOD TYPING SEROLOGIC RH(D): CPT | Performed by: OBSTETRICS & GYNECOLOGY

## 2020-11-11 PROCEDURE — 84450 TRANSFERASE (AST) (SGOT): CPT | Performed by: OBSTETRICS & GYNECOLOGY

## 2020-11-11 PROCEDURE — U0003 INFECTIOUS AGENT DETECTION BY NUCLEIC ACID (DNA OR RNA); SEVERE ACUTE RESPIRATORY SYNDROME CORONAVIRUS 2 (SARS-COV-2) (CORONAVIRUS DISEASE [COVID-19]), AMPLIFIED PROBE TECHNIQUE, MAKING USE OF HIGH THROUGHPUT TECHNOLOGIES AS DESCRIBED BY CMS-2020-01-R: HCPCS | Performed by: OBSTETRICS & GYNECOLOGY

## 2020-11-11 PROCEDURE — 120N000001 HC R&B MED SURG/OB

## 2020-11-11 PROCEDURE — 999N000011 HC STATISTIC ANESTHESIA CASE

## 2020-11-11 PROCEDURE — 84460 ALANINE AMINO (ALT) (SGPT): CPT | Performed by: OBSTETRICS & GYNECOLOGY

## 2020-11-11 PROCEDURE — 84156 ASSAY OF PROTEIN URINE: CPT | Performed by: OBSTETRICS & GYNECOLOGY

## 2020-11-11 PROCEDURE — 3E0R3BZ INTRODUCTION OF ANESTHETIC AGENT INTO SPINAL CANAL, PERCUTANEOUS APPROACH: ICD-10-PCS | Performed by: OBSTETRICS & GYNECOLOGY

## 2020-11-11 PROCEDURE — 86850 RBC ANTIBODY SCREEN: CPT | Performed by: OBSTETRICS & GYNECOLOGY

## 2020-11-11 PROCEDURE — 86900 BLOOD TYPING SEROLOGIC ABO: CPT | Performed by: OBSTETRICS & GYNECOLOGY

## 2020-11-11 PROCEDURE — 250N000013 HC RX MED GY IP 250 OP 250 PS 637: Performed by: OBSTETRICS & GYNECOLOGY

## 2020-11-11 RX ORDER — NALOXONE HYDROCHLORIDE 0.4 MG/ML
.1-.4 INJECTION, SOLUTION INTRAMUSCULAR; INTRAVENOUS; SUBCUTANEOUS
Status: DISCONTINUED | OUTPATIENT
Start: 2020-11-11 | End: 2020-11-11

## 2020-11-11 RX ORDER — CARBOPROST TROMETHAMINE 250 UG/ML
250 INJECTION, SOLUTION INTRAMUSCULAR
Status: DISCONTINUED | OUTPATIENT
Start: 2020-11-11 | End: 2020-11-12

## 2020-11-11 RX ORDER — NALOXONE HYDROCHLORIDE 0.4 MG/ML
.1-.4 INJECTION, SOLUTION INTRAMUSCULAR; INTRAVENOUS; SUBCUTANEOUS
Status: DISCONTINUED | OUTPATIENT
Start: 2020-11-11 | End: 2020-11-12

## 2020-11-11 RX ORDER — HYDROXYZINE HYDROCHLORIDE 50 MG/1
50 TABLET, FILM COATED ORAL EVERY 6 HOURS PRN
Status: DISCONTINUED | OUTPATIENT
Start: 2020-11-11 | End: 2020-11-13 | Stop reason: HOSPADM

## 2020-11-11 RX ORDER — ONDANSETRON 2 MG/ML
4 INJECTION INTRAMUSCULAR; INTRAVENOUS EVERY 6 HOURS PRN
Status: DISCONTINUED | OUTPATIENT
Start: 2020-11-11 | End: 2020-11-13 | Stop reason: CLARIF

## 2020-11-11 RX ORDER — FENTANYL CITRATE 50 UG/ML
50-100 INJECTION, SOLUTION INTRAMUSCULAR; INTRAVENOUS
Status: DISCONTINUED | OUTPATIENT
Start: 2020-11-11 | End: 2020-11-12

## 2020-11-11 RX ORDER — OXYCODONE AND ACETAMINOPHEN 5; 325 MG/1; MG/1
1 TABLET ORAL
Status: DISCONTINUED | OUTPATIENT
Start: 2020-11-11 | End: 2020-11-12

## 2020-11-11 RX ORDER — ONDANSETRON 2 MG/ML
4 INJECTION INTRAMUSCULAR; INTRAVENOUS EVERY 6 HOURS PRN
Status: DISCONTINUED | OUTPATIENT
Start: 2020-11-11 | End: 2020-11-11

## 2020-11-11 RX ORDER — SODIUM CHLORIDE, SODIUM LACTATE, POTASSIUM CHLORIDE, CALCIUM CHLORIDE 600; 310; 30; 20 MG/100ML; MG/100ML; MG/100ML; MG/100ML
INJECTION, SOLUTION INTRAVENOUS CONTINUOUS
Status: DISCONTINUED | OUTPATIENT
Start: 2020-11-11 | End: 2020-11-12

## 2020-11-11 RX ORDER — NALBUPHINE HYDROCHLORIDE 20 MG/ML
2.5-5 INJECTION, SOLUTION INTRAMUSCULAR; INTRAVENOUS; SUBCUTANEOUS EVERY 6 HOURS PRN
Status: DISCONTINUED | OUTPATIENT
Start: 2020-11-11 | End: 2020-11-13 | Stop reason: HOSPADM

## 2020-11-11 RX ORDER — TRANEXAMIC ACID 10 MG/ML
1 INJECTION, SOLUTION INTRAVENOUS EVERY 30 MIN PRN
Status: DISCONTINUED | OUTPATIENT
Start: 2020-11-11 | End: 2020-11-12

## 2020-11-11 RX ORDER — HYDROXYZINE HYDROCHLORIDE 25 MG/1
25 TABLET, FILM COATED ORAL EVERY 6 HOURS PRN
Status: DISCONTINUED | OUTPATIENT
Start: 2020-11-11 | End: 2020-11-13 | Stop reason: HOSPADM

## 2020-11-11 RX ORDER — ACETAMINOPHEN 325 MG/1
650 TABLET ORAL EVERY 4 HOURS PRN
Status: DISCONTINUED | OUTPATIENT
Start: 2020-11-11 | End: 2020-11-12

## 2020-11-11 RX ORDER — MISOPROSTOL 100 UG/1
25 TABLET ORAL
Status: DISCONTINUED | OUTPATIENT
Start: 2020-11-11 | End: 2020-11-12

## 2020-11-11 RX ORDER — ONDANSETRON 4 MG/1
4 TABLET, ORALLY DISINTEGRATING ORAL EVERY 6 HOURS PRN
Status: DISCONTINUED | OUTPATIENT
Start: 2020-11-11 | End: 2020-11-13 | Stop reason: CLARIF

## 2020-11-11 RX ORDER — OXYTOCIN 10 [USP'U]/ML
10 INJECTION, SOLUTION INTRAMUSCULAR; INTRAVENOUS
Status: DISCONTINUED | OUTPATIENT
Start: 2020-11-11 | End: 2020-11-12

## 2020-11-11 RX ORDER — MORPHINE SULFATE 10 MG/ML
10 INJECTION, SOLUTION INTRAMUSCULAR; INTRAVENOUS ONCE
Status: DISCONTINUED | OUTPATIENT
Start: 2020-11-11 | End: 2020-11-13 | Stop reason: CLARIF

## 2020-11-11 RX ORDER — EPHEDRINE SULFATE 50 MG/ML
5 INJECTION, SOLUTION INTRAMUSCULAR; INTRAVENOUS; SUBCUTANEOUS
Status: DISCONTINUED | OUTPATIENT
Start: 2020-11-11 | End: 2020-11-13 | Stop reason: CLARIF

## 2020-11-11 RX ORDER — OXYTOCIN/0.9 % SODIUM CHLORIDE 30/500 ML
100-340 PLASTIC BAG, INJECTION (ML) INTRAVENOUS CONTINUOUS PRN
Status: COMPLETED | OUTPATIENT
Start: 2020-11-11 | End: 2020-11-12

## 2020-11-11 RX ORDER — METHYLERGONOVINE MALEATE 0.2 MG/ML
200 INJECTION INTRAVENOUS
Status: DISCONTINUED | OUTPATIENT
Start: 2020-11-11 | End: 2020-11-12

## 2020-11-11 RX ORDER — IBUPROFEN 800 MG/1
800 TABLET, FILM COATED ORAL
Status: DISCONTINUED | OUTPATIENT
Start: 2020-11-11 | End: 2020-11-12

## 2020-11-11 RX ADMIN — Medication 25 MCG: at 19:59

## 2020-11-11 RX ADMIN — Medication 25 MCG: at 17:59

## 2020-11-11 RX ADMIN — Medication 25 MCG: at 22:03

## 2020-11-11 RX ADMIN — Medication 25 MCG: at 23:58

## 2020-11-11 RX ADMIN — Medication 25 MCG: at 16:08

## 2020-11-11 ASSESSMENT — MIFFLIN-ST. JEOR: SCORE: 1578.28

## 2020-11-11 NOTE — PLAN OF CARE
Data: Patient presented to [facility]: 2020  2:20 PM.  Reason for maternal/fetal assessment is hypertension disorder of pregnancy. Patient was sent over from clinic for PIH evaluation for elevated BPs.  Patient is a .  Prenatal record reviewed. Pregnancy  has been complicated by gestational hypertension and thrombocytopenia.  Gestational Age 39 weeks. VSS. Fetal movement present. Patient denies uterine contractions, leaking of vaginal fluid/rupture of membranes, vaginal bleeding, nausea, vomiting, headache, visual disturbances, epigastric or URQ pain, significant edema. Support person is not present.   Action: Verbal consent for EFM. Triage assessment completed. Bill of rights reviewed.  Response: Patient verbalized agreement with plan. Labs sent per telephone orders from Dr. Meadows.  Will contact Dr Jossie Rahman with update and further orders.

## 2020-11-11 NOTE — PROVIDER NOTIFICATION
11/11/20 1552   Provider Notification   Provider Name/Title Dr. Rahman   Method of Notification Phone   Request Evaluate - Remote   Notification Reason Patient Arrived;Membrane Status;Labor Status;Uterine Activity;Lab/Diagnostic Study     Impatient orders received.  Cervical ripening with PO cytotec.

## 2020-11-12 LAB
BACTERIA SPEC CULT: NORMAL
Lab: NORMAL
SPECIMEN SOURCE: NORMAL
T PALLIDUM AB SER QL: NONREACTIVE

## 2020-11-12 PROCEDURE — 250N000009 HC RX 250: Performed by: OBSTETRICS & GYNECOLOGY

## 2020-11-12 PROCEDURE — 258N000003 HC RX IP 258 OP 636: Performed by: ANESTHESIOLOGY

## 2020-11-12 PROCEDURE — 120N000001 HC R&B MED SURG/OB

## 2020-11-12 PROCEDURE — 250N000013 HC RX MED GY IP 250 OP 250 PS 637: Performed by: OBSTETRICS & GYNECOLOGY

## 2020-11-12 PROCEDURE — 250N000011 HC RX IP 250 OP 636: Performed by: ANESTHESIOLOGY

## 2020-11-12 PROCEDURE — 722N000001 HC LABOR CARE VAGINAL DELIVERY SINGLE

## 2020-11-12 RX ORDER — OXYTOCIN 10 [USP'U]/ML
10 INJECTION, SOLUTION INTRAMUSCULAR; INTRAVENOUS
Status: DISCONTINUED | OUTPATIENT
Start: 2020-11-12 | End: 2020-11-13 | Stop reason: HOSPADM

## 2020-11-12 RX ORDER — HYDROCORTISONE 2.5 %
CREAM (GRAM) TOPICAL 3 TIMES DAILY PRN
Status: DISCONTINUED | OUTPATIENT
Start: 2020-11-12 | End: 2020-11-13 | Stop reason: HOSPADM

## 2020-11-12 RX ORDER — MODIFIED LANOLIN
OINTMENT (GRAM) TOPICAL
Status: DISCONTINUED | OUTPATIENT
Start: 2020-11-12 | End: 2020-11-13 | Stop reason: HOSPADM

## 2020-11-12 RX ORDER — IBUPROFEN 800 MG/1
800 TABLET, FILM COATED ORAL EVERY 6 HOURS PRN
Status: DISCONTINUED | OUTPATIENT
Start: 2020-11-12 | End: 2020-11-13 | Stop reason: HOSPADM

## 2020-11-12 RX ORDER — AMOXICILLIN 250 MG
1 CAPSULE ORAL 2 TIMES DAILY
Status: DISCONTINUED | OUTPATIENT
Start: 2020-11-12 | End: 2020-11-13 | Stop reason: HOSPADM

## 2020-11-12 RX ORDER — OXYTOCIN/0.9 % SODIUM CHLORIDE 30/500 ML
100 PLASTIC BAG, INJECTION (ML) INTRAVENOUS CONTINUOUS
Status: DISCONTINUED | OUTPATIENT
Start: 2020-11-12 | End: 2020-11-13 | Stop reason: HOSPADM

## 2020-11-12 RX ORDER — OXYTOCIN/0.9 % SODIUM CHLORIDE 30/500 ML
340 PLASTIC BAG, INJECTION (ML) INTRAVENOUS CONTINUOUS PRN
Status: DISCONTINUED | OUTPATIENT
Start: 2020-11-12 | End: 2020-11-13 | Stop reason: HOSPADM

## 2020-11-12 RX ORDER — BISACODYL 10 MG
10 SUPPOSITORY, RECTAL RECTAL DAILY PRN
Status: DISCONTINUED | OUTPATIENT
Start: 2020-11-14 | End: 2020-11-13 | Stop reason: HOSPADM

## 2020-11-12 RX ORDER — ACETAMINOPHEN 325 MG/1
650 TABLET ORAL EVERY 4 HOURS PRN
Status: DISCONTINUED | OUTPATIENT
Start: 2020-11-12 | End: 2020-11-13 | Stop reason: HOSPADM

## 2020-11-12 RX ORDER — TRANEXAMIC ACID 10 MG/ML
1 INJECTION, SOLUTION INTRAVENOUS EVERY 30 MIN PRN
Status: DISCONTINUED | OUTPATIENT
Start: 2020-11-12 | End: 2020-11-13 | Stop reason: HOSPADM

## 2020-11-12 RX ORDER — NALOXONE HYDROCHLORIDE 0.4 MG/ML
.1-.4 INJECTION, SOLUTION INTRAMUSCULAR; INTRAVENOUS; SUBCUTANEOUS
Status: DISCONTINUED | OUTPATIENT
Start: 2020-11-12 | End: 2020-11-13 | Stop reason: HOSPADM

## 2020-11-12 RX ORDER — AMOXICILLIN 250 MG
2 CAPSULE ORAL 2 TIMES DAILY
Status: DISCONTINUED | OUTPATIENT
Start: 2020-11-12 | End: 2020-11-13 | Stop reason: HOSPADM

## 2020-11-12 RX ADMIN — IBUPROFEN 800 MG: 800 TABLET ORAL at 21:33

## 2020-11-12 RX ADMIN — SENNOSIDES AND DOCUSATE SODIUM 1 TABLET: 8.6; 5 TABLET ORAL at 21:33

## 2020-11-12 RX ADMIN — SODIUM CHLORIDE, POTASSIUM CHLORIDE, SODIUM LACTATE AND CALCIUM CHLORIDE 500 ML: 600; 310; 30; 20 INJECTION, SOLUTION INTRAVENOUS at 03:57

## 2020-11-12 RX ADMIN — IBUPROFEN 800 MG: 800 TABLET ORAL at 14:35

## 2020-11-12 RX ADMIN — IBUPROFEN 800 MG: 800 TABLET ORAL at 08:40

## 2020-11-12 RX ADMIN — Medication: at 04:20

## 2020-11-12 RX ADMIN — Medication 340 ML/HR: at 07:46

## 2020-11-12 NOTE — ANESTHESIA PREPROCEDURE EVALUATION
Anesthesia Pre-Procedure Evaluation    Patient: Christina Orosco   MRN: [id number] : 1990          Preoperative Diagnosis: * No surgery found *        Past Medical History:   Diagnosis Date     Infectious mononucleosis      LSIL (low grade squamous intraepithelial lesion) on Pap smear 2007    colp - WNL     Migraines      NONSPECIFIC MEDICAL HISTORY     rt elbow fracturem snowboarding     NONSPECIFIC MEDICAL HISTORY 9 yrs old    bilat wrist fractures due to injury     Past Surgical History:   Procedure Laterality Date     HC TOOTH EXTRACTION W/FORCEP      wisdom teeth      SURGICAL HISTORY OF -       rt elbow surgery x2 for fracture     Anesthesia Evaluation       history and physical reviewed .             ROS/MED HX    ENT/Pulmonary:  - neg pulmonary ROS     Neurologic:  - neg neurologic ROS     Cardiovascular:  - neg cardiovascular ROS       METS/Exercise Tolerance:     Hematologic:         Musculoskeletal:         GI/Hepatic:  - neg GI/hepatic ROS       Renal/Genitourinary:         Endo:         Psychiatric:         Infectious Disease:         Malignancy:         Other:                     neg OB ROS            Physical Exam  Normal systems: cardiovascular and pulmonary    Airway   Mallampati: II    Dental     Cardiovascular       Pulmonary             Lab Results   Component Value Date    WBC 9.4 2020    HGB 11.3 (L) 2020    HCT 34.2 (L) 2020     (L) 2020     2020    POTASSIUM 3.8 2020    CHLORIDE 107 2020    CO2 20 2020    BUN 9 2020    CR 0.58 2020    GLC 86 2020    RANDAL 8.8 2020    ALT 15 2020    AST 19 2020    TSH 1.41 2020       Preop Vitals  BP Readings from Last 3 Encounters:   20 126/85   20 112/68   19 120/62    Pulse Readings from Last 3 Encounters:   20 94   19 76   18 75      Resp Readings from Last 3 Encounters:   20 18   20 12  "  05/19/17 20    SpO2 Readings from Last 3 Encounters:   01/14/20 99%   11/07/18 99%   05/11/18 97%      Temp Readings from Last 1 Encounters:   11/11/20 98.4  F (36.9  C) (Oral)    Ht Readings from Last 1 Encounters:   11/11/20 1.645 m (5' 4.75\")      Wt Readings from Last 1 Encounters:   11/11/20 86.6 kg (191 lb)    Estimated body mass index is 32.03 kg/m  as calculated from the following:    Height as of this encounter: 1.645 m (5' 4.75\").    Weight as of this encounter: 86.6 kg (191 lb).       Anesthesia Plan      History & Physical Review      ASA Status:  2 .  OB Epidural Asa: 2            Postoperative Care      Consents  Anesthetic plan, risks, benefits and alternatives discussed with:  Patient..                 Jaleel Wray MD                    .  "

## 2020-11-12 NOTE — PROVIDER NOTIFICATION
11/12/20 0705   Provider Notification   Provider Name/Title Maurisio   Method of Notification Phone   Request Evaluate - Remote   Notification Reason Status Update   MD informed of SVE of 10, He stated he is en route to attend delivery.

## 2020-11-12 NOTE — PROVIDER NOTIFICATION
11/12/20 0331   Provider Notification   Provider Name/Title Maurisio   Method of Notification Phone   Request Evaluate - Remote   Notification Reason Status Update   MD called in for status update after receiving text paging.  Reviewed information provided in text pages. MD acknowledged information and ok'd  epidural request.

## 2020-11-12 NOTE — PROVIDER NOTIFICATION
11/12/20 0300   Provider Notification   Provider Name/Title Ольгаblesriram   Method of Notification   (text page)   Request Evaluate - Remote   Notification Reason Status Update   MD text paged with status update, informed of SROM, clear fluid, SVE 2/80/-2, contraction pattern Q 2-3, holding Cytotec due to contraction pattern, category 1 FHR

## 2020-11-12 NOTE — PROVIDER NOTIFICATION
11/12/20 0527   Provider Notification   Provider Name/Title Maurisio   Method of Notification Phone   Request Evaluate - Remote   Notification Reason Status Update   MD called with pt update, informed of SVE 8/90/0, recurrent variable decels with contractions, pt comfort level. Discussed POC.  Will inform MD when pt is complete.

## 2020-11-12 NOTE — PLAN OF CARE
"Pt up to bathroom @ 0200 mentioned that she thought her \"water broke around midnight\" when she was up to the bathroom.  With further inquiry, she stated she felt something leaking when she was in bed, she got up to the bathroom, but  she continued to have little gushes when she changed position in bed. Pt reported fluid was clear.  Pt reports pain with contractions is currently a 5/10, stating at some time she will want an epidural, but she is fine right now.  SVE 2/80%/-2. Bloody show noted with exam.  Marci Q 2-3 minutes.   , mod variability.   "

## 2020-11-12 NOTE — PLAN OF CARE
Data: Christina Orosco transferred to 424 via wheelchair at 1015. Baby transferred via parent's arms.  Patients mobililty level scored using the bedside mobility assistance tool (BMAT). Patient is at a mobility level test number: 4. Mobility equipment used: none required. Required assist of 0 staff members. Further use of BMAT scoring not required.  Action: Receiving unit notified of transfer: Yes. Patient and family notified of room change. Report given to VANESSA Garcia at 1020. Belongings sent to receiving unit. Accompanied by Registered Nurse. Oriented patient to surroundings. Call light within reach. ID bands double-checked with receiving RN.  Response: Patient tolerated transfer and is stable.

## 2020-11-12 NOTE — PLAN OF CARE
Pt admitted to room 424 at 1030. Pt oriented to room, use of call light. UU after voiding at 1030, slightly off to right . No bladder bulge felt. At 1115, patient coughed and noted some free flow on pad. Pt helped to bathroom, voided at 1145. Flow since delivery moderate rubra, no clots or free flow noted. Consulted with charge nurse Fina at 1115, decision made to hang bag of LR at 50cc/hour and continue to assess flow since bag with 30 units of pitocin done at 1115. Flow moderate rubra, pt voiding large amounts every 2 hours. FF at UU now, midline. Continue to assess flow. VSS.

## 2020-11-12 NOTE — L&D DELIVERY NOTE
Delivery Date:  2020      PROCEDURE:  Spontaneous vaginal delivery.      DETAILS:  This patient is a 29-year-old,  2, now para 1 who presented to Labor and Delivery at 39 weeks' gestation for induction of labor.  She had Cytotec, spontaneously ruptured for moderate meconium-stained fluid.  Had normal progress through the first and second stage of labor, receiving an epidural, pushed very effectively and delivered in a controlled fashion.  The nares and oropharynx were suctioned on the perineum.  There was a very tight nuchal cord, which could not be reduced.  It was cross-clamped and cut.  The body delivered without difficulty, and this vigorous female infant was placed on the mother's abdomen.  This was a female infant with Apgars assigned of 8 and 9.  The placenta delivered spontaneously intact.  The vagina revealed a small second-degree midline laceration which was repaired with 3-0 Vicryl.  The uterus involuted well with blood loss was less than 100 mL.  Sponge and needle counts were correct and the mother and baby are recovering well together upon my departure.         LINDSEY RAYO MD             D: 2020   T: 2020   MT: LINK      Name:     MAYDA MADISON   MRN:      [id number]        Account:        [id number]   :      1990        Delivery Date:  2020               Document: [id number]

## 2020-11-12 NOTE — PROVIDER NOTIFICATION
11/12/20 0328   Provider Notification   Provider Name/Title Maurisio   Method of Notification   (text page)   Request Evaluate - Remote   Notification Reason Status Update    MD sent text page informing of change of amniotic fluid to thick mec and pt request for epidural

## 2020-11-13 ENCOUNTER — LACTATION ENCOUNTER (OUTPATIENT)
Age: 30
End: 2020-11-13

## 2020-11-13 VITALS
OXYGEN SATURATION: 97 % | HEIGHT: 65 IN | HEART RATE: 84 BPM | TEMPERATURE: 98.9 F | SYSTOLIC BLOOD PRESSURE: 135 MMHG | RESPIRATION RATE: 18 BRPM | BODY MASS INDEX: 31.82 KG/M2 | WEIGHT: 191 LBS | DIASTOLIC BLOOD PRESSURE: 85 MMHG

## 2020-11-13 PROCEDURE — 250N000013 HC RX MED GY IP 250 OP 250 PS 637: Performed by: OBSTETRICS & GYNECOLOGY

## 2020-11-13 RX ADMIN — IBUPROFEN 800 MG: 800 TABLET ORAL at 03:46

## 2020-11-13 RX ADMIN — IBUPROFEN 800 MG: 800 TABLET ORAL at 10:33

## 2020-11-13 NOTE — DISCHARGE INSTRUCTIONS
Postpartum Vaginal Delivery Instructions    [facility] [phone]    Activity       Ask family and friends for help when you need it.    Do not place anything in your vagina for 6 weeks.    You are not restricted on other activities, but take it easy for a few weeks to allow your body to recover from delivery.  You are able to do any activities you feel up to that point.    No driving until you have stopped taking your pain medications (usually two weeks after delivery).     Call your health care provider if you have any of these symptoms:       Increased pain, swelling, redness, or fluid around your stiches from an episiotomy or perineal tear.    A fever above 100.4 F (38 C) with or without chills when placing a thermometer under your tongue.    You soak a sanitary pad with blood within 1 hour, or you see blood clots larger than a golf ball.    Bleeding that lasts more than 6 weeks.    Vaginal discharge that smells bad.    Severe pain, cramping or tenderness in your lower belly area.    A need to urinate more frequently (use the toilet more often), more urgently (use the toilet very quickly), or it burns when you urinate.    Nausea and vomiting.    Redness, swelling or pain around a vein in your leg.    Problems breastfeeding or a red or painful area on your breast.    Chest pain and cough or are gasping for air.    Problems coping with sadness, anxiety, or depression.  If you have any concerns about hurting yourself or the baby, call your provider immediately.     You have questions or concerns after you return home.     Keep your hands clean:  Always wash your hands before touching your perineal area and stitches.  This helps reduce your risk of infection.  If your hands aren't dirty, you may use an alcohol hand-rub to clean your hands. Keep your nails clean and short.

## 2020-11-13 NOTE — PLAN OF CARE
Pt is up ad kosta, and reports adequate pain control, using ice ,tucks and sulema bottle in addition to oral medication. Family is present and supportive. Pt is attentive to infants needs, breast feeding infant. 2 clots this evening with 1 with void, 1 with ambulation, scant flow with massage. Will continue to monitor. B/P slightly elevated. Meeting expected goals.

## 2020-11-13 NOTE — ANESTHESIA POSTPROCEDURE EVALUATION
S/P epidural for labor.   I or my partner was immediately available for management of this patient during epidural analgesia infusion.  VSS.  Doing well. Block resolved.  Neuro at baseline. Denies positional headache. Minimal side effects easily managed w/ PRN meds. No apparent anesthetic complications. No follow-up required.    Zeus Najera, MDPatient: Christina Orosco    * No procedures listed *    Diagnosis:* No pre-op diagnosis entered *  Diagnosis Additional Information: No value filed.    Anesthesia Type:  Epidural    Note:  Anesthesia Post Evaluation    Last vitals:  Vitals:    11/12/20 2200 11/13/20 0030 11/13/20 0800   BP: (!) 141/71 134/71 135/85   Pulse: 88 72 84   Resp:  18 18   Temp:  98.1  F (36.7  C) 98.9  F (37.2  C)   SpO2:            Electronically Signed By: Zeus Najera MD  November 13, 2020  12:30 PM

## 2020-11-13 NOTE — LACTATION NOTE
This note was copied from a baby's chart.  Lactation visit. Infant had recently completed feeding. This is Natali's first child, and feedings have gone well so far. Some concern for tight frenulum, writer evaluated infant's tongue/suck pattern. Tight frenulum noted, tongue clears gum line with heart shape at tip. Good rhythm, coordination noted when infant was sucking on writer's gloved finger. Natali states her nipples are getting sore, but soreness decreases as feeding goes on. Writer discussed potential need for revision based on breastfeeding symptoms (nipple pain, slow to gain weight, insufficient transfer).  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 They plan to additionally discuss revision with their pediatric practice. Writer discussed what to expect with feeding the next few days, milk supply/volume, and when to initiate pumping. Encouraged Natali to call out for support as needed prior to discharge.

## 2020-11-13 NOTE — PLAN OF CARE
Discharged education reviewed with patient, understanding verbalized. Discharged to home with baby and .

## 2020-11-13 NOTE — PLAN OF CARE
Data: Vital signs within normal limits. Postpartum checks within normal limits - see flow record. Patient eating and drinking normally. Patient able to empty bladder independently and is up ambulating. No apparent signs of infection. perineum healing well. Patient performing self cares and is able to care for infant.  Action: Patient given pain medication this morning, rates soreness at 2. Patient reassessed within 1 hour after each medication and pain was improved - patient stated she was comfortable. Patient education done about self and infant cares and discharge teaching. See flow record.  Response: Positive attachment behaviors observed with infant. Support persons  present.   Plan: Anticipate discharge on today.

## 2020-11-13 NOTE — PLAN OF CARE
Data: Vital signs within normal limits. Postpartum checks within normal limits - see flow record. Patient eating and drinking normally. Patient able to empty bladder independently and is up ambulating. No apparent signs of infection , second degree laceration healing well, using ice packs, sulema bottle, and ticks pads for comfort. No clots or extra vaginal bleeding noted during the night shift. Patient performing self cares and is able to care for infant.  Action: Patient medicated during the shift for cramping. See MAR. Patient reassessed within 1 hour after each medication and pain was improved - patient stated she was comfortable. Patient education done about medication schedule, help with breastfeeding, and was shown how to hand express colostrum. See flow record. Patient has scored a 0 on her depression scale.   Response: Positive attachment behaviors observed with infant. Support persons  present.   Plan: Anticipate discharge on 11/14/20.

## 2020-11-13 NOTE — PROGRESS NOTES
[facility]   Obstetrics Progress Note    Subjective: This is the patient's first day since Vaginal delivery. She is doing well.  She is urinating on her own. Pain is controlled with medication.    Objective:   All vitals stable  Temp: (P) 98.1  F (36.7  C) Temp src: (P) Oral BP: (P) 134/71 Pulse: (P) 72   Resp: (P) 18          EXAM:  Constitutional: healthy, alert, no distress.   Abdomen: Abdomen soft, non-tender. BS normal. No masses, fundus is firm.  JOINT/EXTREMITIES: extremities normal     Last hemoglobin was   Hemoglobin   Date Value Ref Range Status   11/11/2020 11.3 (L) 11.7 - 15.7 g/dL Final   ]    Assessment: Stable postpartum course.    Plan: Routine care. Ambulation encouraged  Breast feeding strategies discussed  Pain control measures as needed  Reportable signs and symptoms dicussed with the patient  Discharge later today    Earline Meadows MD

## 2020-11-16 ENCOUNTER — MEDICAL CORRESPONDENCE (OUTPATIENT)
Dept: HEALTH INFORMATION MANAGEMENT | Facility: CLINIC | Age: 30
End: 2020-11-16

## 2021-03-14 ENCOUNTER — HEALTH MAINTENANCE LETTER (OUTPATIENT)
Age: 31
End: 2021-03-14

## 2021-03-22 ENCOUNTER — NURSE TRIAGE (OUTPATIENT)
Dept: FAMILY MEDICINE | Facility: CLINIC | Age: 31
End: 2021-03-22

## 2021-03-22 ENCOUNTER — OFFICE VISIT (OUTPATIENT)
Dept: FAMILY MEDICINE | Facility: CLINIC | Age: 31
End: 2021-03-22
Payer: COMMERCIAL

## 2021-03-22 ENCOUNTER — HOSPITAL ENCOUNTER (OUTPATIENT)
Dept: ULTRASOUND IMAGING | Facility: CLINIC | Age: 31
Discharge: HOME | End: 2021-03-22
Attending: PHYSICIAN ASSISTANT | Admitting: PHYSICIAN ASSISTANT
Payer: COMMERCIAL

## 2021-03-22 ENCOUNTER — TELEPHONE (OUTPATIENT)
Dept: FAMILY MEDICINE | Facility: CLINIC | Age: 31
End: 2021-03-22

## 2021-03-22 ENCOUNTER — HOSPITAL ENCOUNTER (INPATIENT)
Facility: CLINIC | Age: 31
LOS: 3 days | Discharge: HOME | End: 2021-03-25
Attending: STUDENT IN AN ORGANIZED HEALTH CARE EDUCATION/TRAINING PROGRAM | Admitting: STUDENT IN AN ORGANIZED HEALTH CARE EDUCATION/TRAINING PROGRAM
Payer: COMMERCIAL

## 2021-03-22 VITALS
WEIGHT: 155 LBS | TEMPERATURE: 98.3 F | RESPIRATION RATE: 15 BRPM | DIASTOLIC BLOOD PRESSURE: 88 MMHG | BODY MASS INDEX: 25.99 KG/M2 | OXYGEN SATURATION: 99 % | SYSTOLIC BLOOD PRESSURE: 132 MMHG | HEART RATE: 77 BPM

## 2021-03-22 DIAGNOSIS — R10.13 EPIGASTRIC PAIN: ICD-10-CM

## 2021-03-22 DIAGNOSIS — K80.50 CHOLEDOCHOLITHIASIS: Primary | ICD-10-CM

## 2021-03-22 DIAGNOSIS — G89.18 ACUTE POST-OPERATIVE PAIN: ICD-10-CM

## 2021-03-22 DIAGNOSIS — R11.0 NAUSEA: ICD-10-CM

## 2021-03-22 DIAGNOSIS — K81.0 ACUTE CHOLECYSTITIS: ICD-10-CM

## 2021-03-22 DIAGNOSIS — K81.9 CHOLECYSTITIS: ICD-10-CM

## 2021-03-22 DIAGNOSIS — R10.13 EPIGASTRIC PAIN: Primary | ICD-10-CM

## 2021-03-22 PROBLEM — G43.109 MIGRAINE WITH AURA AND WITHOUT STATUS MIGRAINOSUS, NOT INTRACTABLE: Status: ACTIVE | Noted: 2017-08-04

## 2021-03-22 PROBLEM — F41.9 ANXIETY: Status: ACTIVE | Noted: 2017-06-07

## 2021-03-22 PROBLEM — F32.0 MAJOR DEPRESSIVE DISORDER, SINGLE EPISODE, MILD (H): Status: ACTIVE | Noted: 2017-06-07

## 2021-03-22 PROBLEM — E66.9 OBESITY: Status: ACTIVE | Noted: 2018-07-13

## 2021-03-22 LAB
ALBUMIN SERPL-MCNC: 3.8 G/DL (ref 3.4–5)
ALP SERPL-CCNC: 252 U/L (ref 40–150)
ALT SERPL W P-5'-P-CCNC: 530 U/L (ref 0–50)
ANION GAP SERPL CALCULATED.3IONS-SCNC: 8 MMOL/L (ref 3–14)
AST SERPL W P-5'-P-CCNC: 1129 U/L (ref 0–45)
BILIRUB SERPL-MCNC: 2.8 MG/DL (ref 0.2–1.3)
BUN SERPL-MCNC: 8 MG/DL (ref 7–30)
CALCIUM SERPL-MCNC: 11.1 MG/DL (ref 8.5–10.1)
CHLORIDE SERPL-SCNC: 106 MMOL/L (ref 94–109)
CO2 SERPL-SCNC: 26 MMOL/L (ref 20–32)
CREAT SERPL-MCNC: 0.77 MG/DL (ref 0.52–1.04)
ERYTHROCYTE [DISTWIDTH] IN BLOOD BY AUTOMATED COUNT: 14.3 % (ref 10–15)
GFR SERPL CREATININE-BSD FRML MDRD: >90 ML/MIN/{1.73_M2}
GLUCOSE SERPL-MCNC: 89 MG/DL (ref 70–99)
HCT VFR BLD AUTO: 34.8 % (ref 35–47)
HGB BLD-MCNC: 12 G/DL (ref 11.7–15.7)
LABORATORY COMMENT REPORT: NORMAL
LIPASE SERPL-CCNC: 148 U/L (ref 73–393)
MCH RBC QN AUTO: 28.2 PG (ref 26.5–33)
MCHC RBC AUTO-ENTMCNC: 34.5 G/DL (ref 31.5–36.5)
MCV RBC AUTO: 82 FL (ref 78–100)
PLATELET # BLD AUTO: 189 10E9/L (ref 150–450)
POTASSIUM SERPL-SCNC: 3.8 MMOL/L (ref 3.4–5.3)
PROT SERPL-MCNC: 7 G/DL (ref 6.8–8.8)
RBC # BLD AUTO: 4.26 10E12/L (ref 3.8–5.2)
SARS-COV-2 RNA RESP QL NAA+PROBE: NEGATIVE
SODIUM SERPL-SCNC: 140 MMOL/L (ref 133–144)
SPECIMEN SOURCE: NORMAL
WBC # BLD AUTO: 5.9 10E9/L (ref 4–11)

## 2021-03-22 PROCEDURE — 36415 COLL VENOUS BLD VENIPUNCTURE: CPT | Performed by: PHYSICIAN ASSISTANT

## 2021-03-22 PROCEDURE — U0003 INFECTIOUS AGENT DETECTION BY NUCLEIC ACID (DNA OR RNA); SEVERE ACUTE RESPIRATORY SYNDROME CORONAVIRUS 2 (SARS-COV-2) (CORONAVIRUS DISEASE [COVID-19]), AMPLIFIED PROBE TECHNIQUE, MAKING USE OF HIGH THROUGHPUT TECHNOLOGIES AS DESCRIBED BY CMS-2020-01-R: HCPCS | Performed by: STUDENT IN AN ORGANIZED HEALTH CARE EDUCATION/TRAINING PROGRAM

## 2021-03-22 PROCEDURE — U0005 INFEC AGEN DETEC AMPLI PROBE: HCPCS | Performed by: STUDENT IN AN ORGANIZED HEALTH CARE EDUCATION/TRAINING PROGRAM

## 2021-03-22 PROCEDURE — 80053 COMPREHEN METABOLIC PANEL: CPT | Performed by: PHYSICIAN ASSISTANT

## 2021-03-22 PROCEDURE — 258N000003 HC RX IP 258 OP 636: Performed by: STUDENT IN AN ORGANIZED HEALTH CARE EDUCATION/TRAINING PROGRAM

## 2021-03-22 PROCEDURE — 99223 1ST HOSP IP/OBS HIGH 75: CPT | Mod: AI | Performed by: STUDENT IN AN ORGANIZED HEALTH CARE EDUCATION/TRAINING PROGRAM

## 2021-03-22 PROCEDURE — 120N000001 HC R&B MED SURG/OB

## 2021-03-22 PROCEDURE — 76705 ECHO EXAM OF ABDOMEN: CPT

## 2021-03-22 PROCEDURE — 250N000011 HC RX IP 250 OP 636: Performed by: STUDENT IN AN ORGANIZED HEALTH CARE EDUCATION/TRAINING PROGRAM

## 2021-03-22 PROCEDURE — 83690 ASSAY OF LIPASE: CPT | Performed by: PHYSICIAN ASSISTANT

## 2021-03-22 PROCEDURE — 85027 COMPLETE CBC AUTOMATED: CPT | Performed by: PHYSICIAN ASSISTANT

## 2021-03-22 PROCEDURE — 99215 OFFICE O/P EST HI 40 MIN: CPT | Performed by: PHYSICIAN ASSISTANT

## 2021-03-22 RX ORDER — ONDANSETRON 2 MG/ML
4 INJECTION INTRAMUSCULAR; INTRAVENOUS EVERY 6 HOURS PRN
Status: DISCONTINUED | OUTPATIENT
Start: 2021-03-22 | End: 2021-03-25 | Stop reason: HOSPADM

## 2021-03-22 RX ORDER — NALOXONE HYDROCHLORIDE 0.4 MG/ML
0.4 INJECTION, SOLUTION INTRAMUSCULAR; INTRAVENOUS; SUBCUTANEOUS
Status: DISCONTINUED | OUTPATIENT
Start: 2021-03-22 | End: 2021-03-25 | Stop reason: HOSPADM

## 2021-03-22 RX ORDER — NALOXONE HYDROCHLORIDE 0.4 MG/ML
0.2 INJECTION, SOLUTION INTRAMUSCULAR; INTRAVENOUS; SUBCUTANEOUS
Status: DISCONTINUED | OUTPATIENT
Start: 2021-03-22 | End: 2021-03-25 | Stop reason: HOSPADM

## 2021-03-22 RX ORDER — NORETHINDRONE
0.35 KIT DAILY
COMMUNITY
Start: 2021-01-08 | End: 2022-05-04

## 2021-03-22 RX ORDER — LIDOCAINE 40 MG/G
CREAM TOPICAL
Status: DISCONTINUED | OUTPATIENT
Start: 2021-03-22 | End: 2021-03-25 | Stop reason: HOSPADM

## 2021-03-22 RX ORDER — ONDANSETRON 4 MG/1
4 TABLET, ORALLY DISINTEGRATING ORAL EVERY 6 HOURS PRN
Status: DISCONTINUED | OUTPATIENT
Start: 2021-03-22 | End: 2021-03-25 | Stop reason: HOSPADM

## 2021-03-22 RX ORDER — ONDANSETRON 4 MG/1
4 TABLET, ORALLY DISINTEGRATING ORAL EVERY 8 HOURS PRN
Qty: 10 TABLET | Refills: 0 | Status: ON HOLD | OUTPATIENT
Start: 2021-03-22 | End: 2021-03-25

## 2021-03-22 RX ORDER — SODIUM CHLORIDE, SODIUM LACTATE, POTASSIUM CHLORIDE, CALCIUM CHLORIDE 600; 310; 30; 20 MG/100ML; MG/100ML; MG/100ML; MG/100ML
INJECTION, SOLUTION INTRAVENOUS CONTINUOUS
Status: DISCONTINUED | OUTPATIENT
Start: 2021-03-22 | End: 2021-03-25 | Stop reason: HOSPADM

## 2021-03-22 RX ORDER — CHOLECALCIFEROL (VITAMIN D3) 50 MCG
3 TABLET ORAL DAILY
COMMUNITY
End: 2022-05-04

## 2021-03-22 RX ORDER — OXYCODONE HYDROCHLORIDE 5 MG/1
5 TABLET ORAL EVERY 6 HOURS PRN
Qty: 6 TABLET | Refills: 0 | Status: ON HOLD | OUTPATIENT
Start: 2021-03-22 | End: 2021-03-25

## 2021-03-22 RX ORDER — OXYCODONE HYDROCHLORIDE 5 MG/1
5 TABLET ORAL EVERY 6 HOURS PRN
Status: DISCONTINUED | OUTPATIENT
Start: 2021-03-22 | End: 2021-03-25 | Stop reason: HOSPADM

## 2021-03-22 RX ADMIN — ONDANSETRON 4 MG: 2 INJECTION INTRAMUSCULAR; INTRAVENOUS at 21:46

## 2021-03-22 RX ADMIN — SODIUM CHLORIDE, POTASSIUM CHLORIDE, SODIUM LACTATE AND CALCIUM CHLORIDE: 600; 310; 30; 20 INJECTION, SOLUTION INTRAVENOUS at 21:39

## 2021-03-22 ASSESSMENT — PATIENT HEALTH QUESTIONNAIRE - PHQ9: SUM OF ALL RESPONSES TO PHQ QUESTIONS 1-9: 5

## 2021-03-22 NOTE — TELEPHONE ENCOUNTER
I called patient and informed her of results and recommendation for direct admission. She is in agreement.  Working with nursing staff to discuss case with admitting physician. Abdullahi is on divert. Patient willing to go to [facility].

## 2021-03-22 NOTE — PATIENT INSTRUCTIONS
Take nausea medication as needed. Time with pumping (take right after pumping and skip/dump next pumping session).    I will call with US results.    Tylenol 1000 mg up to 3 times daily for pain. Can also use ibuprofen 400-600 mg every 6-8 hours. If using oxycodone take with stool softener and pump and dump milk.

## 2021-03-22 NOTE — PROGRESS NOTES
Assessment & Plan     Epigastric pain  Work up initiated for patient including labs and ultrasound of the gallbladder. Her mother has history of gallbladder troubles and required surgery. Due to the epigastric pain I did order lipase as well.  I recommended a low fat/no fat diet. No eating until she has ultrasound this afternoon.  Zofran prescribed for nausea. She can use this breastfeeding. We discussed the safest way to take it as it is unclear whether it is found in breast milk (see patient instructions).  Ultrasound shows signs of cholecystitis. Referral to General Surgery placed. I call patient and results of labs (except CMP) and ultrasound given.   She asked for better control of the pain. We discussed pain control with OTC medications and oxycodone sent to her pharmacy as well.    CMP came back and shows markedly elevated liver enzymes. I spoke with Dr. Claudio Kta, on call for general surgery, who recommended direct admission for further evaluation and treatment.  - Comprehensive metabolic panel (BMP + Alb, Alk Phos, ALT, AST, Total. Bili, TP)  - Lipase  - CBC with platelets  - US Abdomen Limited; Future    Nausea  As noted above.  - ondansetron (ZOFRAN-ODT) 4 MG ODT tab; Take 1 tablet (4 mg) by mouth every 8 hours as needed for nausea    Review of the result(s) of each unique test - As noted above  45 minutes spent on the date of the encounter doing chart review, history and exam, documentation and further activities as noted above       Patient Instructions   Take nausea medication as needed. Time with pumping (take right after pumping and skip/dump next pumping session).    I will call with US results.    Tylenol 1000 mg up to 3 times daily for pain. Can also use ibuprofen 400-600 mg every 6-8 hours. If using oxycodone take with stool softener and pump and dump milk.    Roberta Paul PA-C  [facility]    Subjective   Natali is a 30 year old who presents for the  "following health issues    HPI     Concern - Stomach issues  Onset: 2 weeks ago- worse in the past 2 days  Description: vomiting at the beginning, no vomiting now. Pain in epigastric region. Starting to hurt her back. Having trouble doing daily activities. Keeping her up at night. Better when she's sitting up.  Feeling nauseated. Has not eaten since yesterday at lunch.  Eating worsens pain. Feeling \"full\".  Mother had history of gallstones and had gallbladder removed.  She notes she seems to be having some \"heartburn\"  Intensity: moderate  Progression of Symptoms:  worsening  Precipitating factors:        Worsened by: picking up heavy things.  Alleviating factors:        Improved by: acetaminophen and Pepcid   Therapies tried and outcome: acetaminophen and Pepcid, was able to fall asleep    Home pregnancy test negative 2 weeks ago.    Patient is about 4 months postpartum. She is currently breastfeeding/pumping 3 times daily.    Review of Systems   GENERAL:  No fevers  GI:  As noted in HPI        Objective    /88 (BP Location: Right arm, Patient Position: Sitting, Cuff Size: Adult Regular)   Pulse 77   Temp 98.3  F (36.8  C) (Oral)   Resp 15   Wt 70.3 kg (155 lb)   SpO2 99%   BMI 25.99 kg/m    Body mass index is 25.99 kg/m .  Physical Exam   GENERAL: No acute distress  HEENT: Normocephalic,  CARDIAC: Regular rate and rhythm. No murmurs.  PULMONARY: Lungs are clear to auscultation bilaterally. No wheezes, rhonchi or crackles.  GI: Active bowel sounds, abdomen is soft. Tenderness in the epigastrium and right upper quadrant. Positive Shobha's sign.  : No CVA tenderness bilaterally.   NEURO: Alert and non-focal      Results for orders placed or performed in visit on 03/22/21 (from the past 24 hour(s))   Comprehensive metabolic panel (BMP + Alb, Alk Phos, ALT, AST, Total. Bili, TP)   Result Value Ref Range    Sodium 140 133 - 144 mmol/L    Potassium 3.8 3.4 - 5.3 mmol/L    Chloride 106 94 - 109 mmol/L    " Carbon Dioxide 26 20 - 32 mmol/L    Anion Gap 8 3 - 14 mmol/L    Glucose 89 70 - 99 mg/dL    Urea Nitrogen 8 7 - 30 mg/dL    Creatinine 0.77 0.52 - 1.04 mg/dL    GFR Estimate >90 >60 mL/min/[1.73_m2]    GFR Estimate If Black >90 >60 mL/min/[1.73_m2]    Calcium 11.1 (H) 8.5 - 10.1 mg/dL    Bilirubin Total 2.8 (H) 0.2 - 1.3 mg/dL    Albumin 3.8 3.4 - 5.0 g/dL    Protein Total 7.0 6.8 - 8.8 g/dL    Alkaline Phosphatase 252 (H) 40 - 150 U/L     (HH) 0 - 50 U/L    AST 1,129 (HH) 0 - 45 U/L   Lipase   Result Value Ref Range    Lipase 148 73 - 393 U/L   CBC with platelets   Result Value Ref Range    WBC 5.9 4.0 - 11.0 10e9/L    RBC Count 4.26 3.8 - 5.2 10e12/L    Hemoglobin 12.0 11.7 - 15.7 g/dL    Hematocrit 34.8 (L) 35.0 - 47.0 %    MCV 82 78 - 100 fl    MCH 28.2 26.5 - 33.0 pg    MCHC 34.5 31.5 - 36.5 g/dL    RDW 14.3 10.0 - 15.0 %    Platelet Count 189 150 - 450 10e9/L

## 2021-03-22 NOTE — TELEPHONE ENCOUNTER
"Called patient to triage.  On schedule for sever stomach, back and chest pain.  Thinks she had food poisoning a couple of weeks ago for 12 hours.  Currently she is having dull pain in stomach, back and chest and feels like extreme heartburn at times.  States when takes deep breath hurts in middle of her stomach.  States \"yassine just hurts everywhere (R) side more than (L)\".  Symptoms x 1 week and worsening in last couple of days.  No fever.  Some nausea.  No vomiting.  Pain is always there and does get worse when eats and when eats feels extremely full.  Last night was the worse and had not eaten anything for 12 hours.  Has not weighed self.  Last night took 2 Acetaminophen and 1 Pepcid and that helped a little bit at about 1:30 am.  Did have baby 4 months ago.  Had heartburn when pregnant but this feels different.  Advised to arrive at 9 am.  Updated appt to 30 minutes.  Natasha Mitchell RN          "

## 2021-03-22 NOTE — TELEPHONE ENCOUNTER
Critical lab called in    AST 1129  Called results to Roberta LOVELL RN, BSN, PAL (Patient Advocate Liaison)  [facility]   [phone]

## 2021-03-22 NOTE — TELEPHONE ENCOUNTER
Spoke with Dr. Charles admitting physician. Agreed to admission. Patient informed and floor nurse will call her when bed is ready.

## 2021-03-22 NOTE — TELEPHONE ENCOUNTER
Michelle pt placement line [phone]    Aziza LOVELL RN, BSN, PAL (Patient Advocate Liaison)  [facility]   [phone]

## 2021-03-22 NOTE — TELEPHONE ENCOUNTER
I spoke with Dr. Kta, on call for general surgery. He recommended possible direct admission to the hospital for further evaluation and treatment. He was concerned about possible gallbladder stone in the common bile duct.    I called patient to discuss and left a message to call back for lab results and next steps.

## 2021-03-23 ENCOUNTER — ANESTHESIA EVENT (OUTPATIENT)
Dept: SURGERY | Facility: CLINIC | Age: 31
End: 2021-03-23
Payer: COMMERCIAL

## 2021-03-23 ENCOUNTER — ANESTHESIA (OUTPATIENT)
Dept: SURGERY | Facility: CLINIC | Age: 31
End: 2021-03-23
Payer: COMMERCIAL

## 2021-03-23 ENCOUNTER — APPOINTMENT (OUTPATIENT)
Dept: GENERAL RADIOLOGY | Facility: CLINIC | Age: 31
End: 2021-03-23
Attending: STUDENT IN AN ORGANIZED HEALTH CARE EDUCATION/TRAINING PROGRAM
Payer: COMMERCIAL

## 2021-03-23 LAB
ALBUMIN SERPL-MCNC: 3.4 G/DL (ref 3.4–5)
ALP SERPL-CCNC: 296 U/L (ref 40–150)
ALT SERPL W P-5'-P-CCNC: 844 U/L (ref 0–50)
ANION GAP SERPL CALCULATED.3IONS-SCNC: 6 MMOL/L (ref 3–14)
AST SERPL W P-5'-P-CCNC: 719 U/L (ref 0–45)
BILIRUB DIRECT SERPL-MCNC: 4 MG/DL (ref 0–0.2)
BILIRUB SERPL-MCNC: 5.9 MG/DL (ref 0.2–1.3)
BUN SERPL-MCNC: 8 MG/DL (ref 7–30)
CALCIUM SERPL-MCNC: 9.1 MG/DL (ref 8.5–10.1)
CHLORIDE SERPL-SCNC: 109 MMOL/L (ref 94–109)
CO2 SERPL-SCNC: 27 MMOL/L (ref 20–32)
CREAT SERPL-MCNC: 0.75 MG/DL (ref 0.52–1.04)
ERYTHROCYTE [DISTWIDTH] IN BLOOD BY AUTOMATED COUNT: 13.9 % (ref 10–15)
GFR SERPL CREATININE-BSD FRML MDRD: >90 ML/MIN/{1.73_M2}
GLUCOSE SERPL-MCNC: 78 MG/DL (ref 70–99)
HCG UR QL: NEGATIVE
HCT VFR BLD AUTO: 34.7 % (ref 35–47)
HGB BLD-MCNC: 11.6 G/DL (ref 11.7–15.7)
INR PPP: 1.14 (ref 0.86–1.14)
MCH RBC QN AUTO: 27.6 PG (ref 26.5–33)
MCHC RBC AUTO-ENTMCNC: 33.4 G/DL (ref 31.5–36.5)
MCV RBC AUTO: 82 FL (ref 78–100)
PLATELET # BLD AUTO: 176 10E9/L (ref 150–450)
POTASSIUM SERPL-SCNC: 3.6 MMOL/L (ref 3.4–5.3)
PROT SERPL-MCNC: 6.4 G/DL (ref 6.8–8.8)
RBC # BLD AUTO: 4.21 10E12/L (ref 3.8–5.2)
SODIUM SERPL-SCNC: 142 MMOL/L (ref 133–144)
WBC # BLD AUTO: 5 10E9/L (ref 4–11)

## 2021-03-23 PROCEDURE — 99232 SBSQ HOSP IP/OBS MODERATE 35: CPT | Performed by: HOSPITALIST

## 2021-03-23 PROCEDURE — 999N000141 HC STATISTIC PRE-PROCEDURE NURSING ASSESSMENT: Performed by: INTERNAL MEDICINE

## 2021-03-23 PROCEDURE — 250N000025 HC SEVOFLURANE, PER MIN: Performed by: INTERNAL MEDICINE

## 2021-03-23 PROCEDURE — 81025 URINE PREGNANCY TEST: CPT | Performed by: INTERNAL MEDICINE

## 2021-03-23 PROCEDURE — 360N000083 HC SURGERY LEVEL 3 W/ FLUORO, PER MIN: Performed by: INTERNAL MEDICINE

## 2021-03-23 PROCEDURE — 120N000001 HC R&B MED SURG/OB

## 2021-03-23 PROCEDURE — 250N000013 HC RX MED GY IP 250 OP 250 PS 637: Performed by: STUDENT IN AN ORGANIZED HEALTH CARE EDUCATION/TRAINING PROGRAM

## 2021-03-23 PROCEDURE — 272N000048 HC BALLOON ADDITIONAL: Performed by: INTERNAL MEDICINE

## 2021-03-23 PROCEDURE — 99222 1ST HOSP IP/OBS MODERATE 55: CPT | Mod: 57 | Performed by: SURGERY

## 2021-03-23 PROCEDURE — 250N000011 HC RX IP 250 OP 636: Performed by: NURSE ANESTHETIST, CERTIFIED REGISTERED

## 2021-03-23 PROCEDURE — C1887 CATHETER, GUIDING: HCPCS | Performed by: INTERNAL MEDICINE

## 2021-03-23 PROCEDURE — 80048 BASIC METABOLIC PNL TOTAL CA: CPT | Performed by: STUDENT IN AN ORGANIZED HEALTH CARE EDUCATION/TRAINING PROGRAM

## 2021-03-23 PROCEDURE — 258N000003 HC RX IP 258 OP 636: Performed by: STUDENT IN AN ORGANIZED HEALTH CARE EDUCATION/TRAINING PROGRAM

## 2021-03-23 PROCEDURE — 80076 HEPATIC FUNCTION PANEL: CPT | Performed by: STUDENT IN AN ORGANIZED HEALTH CARE EDUCATION/TRAINING PROGRAM

## 2021-03-23 PROCEDURE — 250N000009 HC RX 250: Performed by: NURSE ANESTHETIST, CERTIFIED REGISTERED

## 2021-03-23 PROCEDURE — 85610 PROTHROMBIN TIME: CPT | Performed by: STUDENT IN AN ORGANIZED HEALTH CARE EDUCATION/TRAINING PROGRAM

## 2021-03-23 PROCEDURE — 85027 COMPLETE CBC AUTOMATED: CPT | Performed by: STUDENT IN AN ORGANIZED HEALTH CARE EDUCATION/TRAINING PROGRAM

## 2021-03-23 PROCEDURE — 250N000011 HC RX IP 250 OP 636: Performed by: SURGERY

## 2021-03-23 PROCEDURE — 250N000009 HC RX 250: Performed by: INTERNAL MEDICINE

## 2021-03-23 PROCEDURE — 0FC98ZZ EXTIRPATION OF MATTER FROM COMMON BILE DUCT, VIA NATURAL OR ARTIFICIAL OPENING ENDOSCOPIC: ICD-10-PCS | Performed by: INTERNAL MEDICINE

## 2021-03-23 PROCEDURE — 74330 X-RAY BILE/PANC ENDOSCOPY: CPT

## 2021-03-23 PROCEDURE — C1769 GUIDE WIRE: HCPCS | Performed by: INTERNAL MEDICINE

## 2021-03-23 PROCEDURE — 370N000017 HC ANESTHESIA TECHNICAL FEE, PER MIN: Performed by: INTERNAL MEDICINE

## 2021-03-23 PROCEDURE — 36415 COLL VENOUS BLD VENIPUNCTURE: CPT | Performed by: STUDENT IN AN ORGANIZED HEALTH CARE EDUCATION/TRAINING PROGRAM

## 2021-03-23 PROCEDURE — 710N000009 HC RECOVERY PHASE 1, LEVEL 1, PER MIN: Performed by: INTERNAL MEDICINE

## 2021-03-23 PROCEDURE — 250N000011 HC RX IP 250 OP 636: Performed by: STUDENT IN AN ORGANIZED HEALTH CARE EDUCATION/TRAINING PROGRAM

## 2021-03-23 RX ORDER — FLUMAZENIL 0.1 MG/ML
0.2 INJECTION, SOLUTION INTRAVENOUS
Status: DISCONTINUED | OUTPATIENT
Start: 2021-03-23 | End: 2021-03-24

## 2021-03-23 RX ORDER — PROPOFOL 10 MG/ML
INJECTION, EMULSION INTRAVENOUS CONTINUOUS PRN
Status: DISCONTINUED | OUTPATIENT
Start: 2021-03-23 | End: 2021-03-23

## 2021-03-23 RX ORDER — INDOMETHACIN 50 MG/1
100 SUPPOSITORY RECTAL
Status: COMPLETED | OUTPATIENT
Start: 2021-03-23 | End: 2021-03-23

## 2021-03-23 RX ORDER — PROPOFOL 10 MG/ML
INJECTION, EMULSION INTRAVENOUS PRN
Status: DISCONTINUED | OUTPATIENT
Start: 2021-03-23 | End: 2021-03-23

## 2021-03-23 RX ORDER — FENTANYL CITRATE 50 UG/ML
25-50 INJECTION, SOLUTION INTRAMUSCULAR; INTRAVENOUS
Status: DISCONTINUED | OUTPATIENT
Start: 2021-03-23 | End: 2021-03-23 | Stop reason: HOSPADM

## 2021-03-23 RX ORDER — ONDANSETRON 2 MG/ML
4 INJECTION INTRAMUSCULAR; INTRAVENOUS EVERY 30 MIN PRN
Status: DISCONTINUED | OUTPATIENT
Start: 2021-03-23 | End: 2021-03-23 | Stop reason: HOSPADM

## 2021-03-23 RX ORDER — DEXAMETHASONE SODIUM PHOSPHATE 4 MG/ML
INJECTION, SOLUTION INTRA-ARTICULAR; INTRALESIONAL; INTRAMUSCULAR; INTRAVENOUS; SOFT TISSUE PRN
Status: DISCONTINUED | OUTPATIENT
Start: 2021-03-23 | End: 2021-03-23

## 2021-03-23 RX ORDER — HYDROMORPHONE HYDROCHLORIDE 1 MG/ML
.3-.5 INJECTION, SOLUTION INTRAMUSCULAR; INTRAVENOUS; SUBCUTANEOUS EVERY 5 MIN PRN
Status: DISCONTINUED | OUTPATIENT
Start: 2021-03-23 | End: 2021-03-23 | Stop reason: HOSPADM

## 2021-03-23 RX ORDER — LIDOCAINE HYDROCHLORIDE 20 MG/ML
INJECTION, SOLUTION INFILTRATION; PERINEURAL PRN
Status: DISCONTINUED | OUTPATIENT
Start: 2021-03-23 | End: 2021-03-23

## 2021-03-23 RX ORDER — SODIUM CHLORIDE, SODIUM LACTATE, POTASSIUM CHLORIDE, CALCIUM CHLORIDE 600; 310; 30; 20 MG/100ML; MG/100ML; MG/100ML; MG/100ML
INJECTION, SOLUTION INTRAVENOUS CONTINUOUS
Status: DISCONTINUED | OUTPATIENT
Start: 2021-03-23 | End: 2021-03-23 | Stop reason: HOSPADM

## 2021-03-23 RX ORDER — LIDOCAINE 40 MG/G
CREAM TOPICAL
Status: DISCONTINUED | OUTPATIENT
Start: 2021-03-23 | End: 2021-03-23 | Stop reason: HOSPADM

## 2021-03-23 RX ORDER — ONDANSETRON 2 MG/ML
INJECTION INTRAMUSCULAR; INTRAVENOUS PRN
Status: DISCONTINUED | OUTPATIENT
Start: 2021-03-23 | End: 2021-03-23

## 2021-03-23 RX ORDER — ONDANSETRON 4 MG/1
4 TABLET, ORALLY DISINTEGRATING ORAL EVERY 30 MIN PRN
Status: DISCONTINUED | OUTPATIENT
Start: 2021-03-23 | End: 2021-03-23 | Stop reason: HOSPADM

## 2021-03-23 RX ORDER — ERTAPENEM 1 G/1
1 INJECTION, POWDER, LYOPHILIZED, FOR SOLUTION INTRAMUSCULAR; INTRAVENOUS
Status: COMPLETED | OUTPATIENT
Start: 2021-03-23 | End: 2021-03-23

## 2021-03-23 RX ORDER — FENTANYL CITRATE 50 UG/ML
INJECTION, SOLUTION INTRAMUSCULAR; INTRAVENOUS PRN
Status: DISCONTINUED | OUTPATIENT
Start: 2021-03-23 | End: 2021-03-23

## 2021-03-23 RX ADMIN — SODIUM CHLORIDE, POTASSIUM CHLORIDE, SODIUM LACTATE AND CALCIUM CHLORIDE: 600; 310; 30; 20 INJECTION, SOLUTION INTRAVENOUS at 22:24

## 2021-03-23 RX ADMIN — MIDAZOLAM 2 MG: 1 INJECTION INTRAMUSCULAR; INTRAVENOUS at 14:57

## 2021-03-23 RX ADMIN — ERTAPENEM SODIUM 1 G: 1 INJECTION, POWDER, LYOPHILIZED, FOR SOLUTION INTRAMUSCULAR; INTRAVENOUS at 15:05

## 2021-03-23 RX ADMIN — OXYCODONE HYDROCHLORIDE 5 MG: 5 TABLET ORAL at 07:35

## 2021-03-23 RX ADMIN — FENTANYL CITRATE 50 MCG: 50 INJECTION, SOLUTION INTRAMUSCULAR; INTRAVENOUS at 15:00

## 2021-03-23 RX ADMIN — SODIUM CHLORIDE, POTASSIUM CHLORIDE, SODIUM LACTATE AND CALCIUM CHLORIDE: 600; 310; 30; 20 INJECTION, SOLUTION INTRAVENOUS at 14:57

## 2021-03-23 RX ADMIN — PROPOFOL 25 MCG/KG/MIN: 10 INJECTION, EMULSION INTRAVENOUS at 15:07

## 2021-03-23 RX ADMIN — SODIUM CHLORIDE, POTASSIUM CHLORIDE, SODIUM LACTATE AND CALCIUM CHLORIDE: 600; 310; 30; 20 INJECTION, SOLUTION INTRAVENOUS at 06:59

## 2021-03-23 RX ADMIN — ONDANSETRON 4 MG: 2 INJECTION INTRAMUSCULAR; INTRAVENOUS at 15:07

## 2021-03-23 RX ADMIN — LIDOCAINE HYDROCHLORIDE 20 MG: 20 INJECTION, SOLUTION INFILTRATION; PERINEURAL at 15:00

## 2021-03-23 RX ADMIN — DEXAMETHASONE SODIUM PHOSPHATE 4 MG: 4 INJECTION, SOLUTION INTRA-ARTICULAR; INTRALESIONAL; INTRAMUSCULAR; INTRAVENOUS; SOFT TISSUE at 15:06

## 2021-03-23 RX ADMIN — SUCCINYLCHOLINE CHLORIDE 80 MG: 20 INJECTION, SOLUTION INTRAMUSCULAR; INTRAVENOUS; PARENTERAL at 15:01

## 2021-03-23 RX ADMIN — ONDANSETRON 4 MG: 2 INJECTION INTRAMUSCULAR; INTRAVENOUS at 12:30

## 2021-03-23 RX ADMIN — PROPOFOL 160 MG: 10 INJECTION, EMULSION INTRAVENOUS at 15:00

## 2021-03-23 ASSESSMENT — ACTIVITIES OF DAILY LIVING (ADL)
ADLS_ACUITY_SCORE: 14

## 2021-03-23 ASSESSMENT — ENCOUNTER SYMPTOMS: SEIZURES: 0

## 2021-03-23 NOTE — PHARMACY-ADMISSION MEDICATION HISTORY
Pharmacy Medication History  Admission medication history interview status for the 3/22/2021  admission is complete. See EPIC admission navigator for prior to admission medications     Location of Interview: Patient room  Medication history sources: Patient        In the past week, patient estimated taking medication this percent of the time: greater than 90%        Medication reconciliation completed by provider prior to medication history? Yes    Time spent in this activity: 15 minutes    Prior to Admission medications    Medication Sig Last Dose Taking? Auth Provider   NORLYDA 0.35 MG tablet Take 0.35 mg by mouth daily  3/21/2021 at Unknown time Yes Reported, Patient   ondansetron (ZOFRAN-ODT) 4 MG ODT tab Take 1 tablet (4 mg) by mouth every 8 hours as needed for nausea  at NEW Yes Roberta Paul PA-C   oxyCODONE (ROXICODONE) 5 MG tablet Take 1 tablet (5 mg) by mouth every 6 hours as needed for severe pain 3/22/2021 at Unknown time Yes Roberta Paul PA-C   Prenatal Vit-Fe Fumarate-FA (PRENATAL MULTIVITAMIN W/IRON) 27-0.8 MG tablet Take 1 tablet by mouth daily 3/21/2021 at Unknown time Yes Homa Roblero MD   vitamin D3 (CHOLECALCIFEROL) 50 mcg (2000 units) tablet Take 3 tablets by mouth daily 3/21/2021 at AM Yes Unknown, Entered By History       The information provided in this note is only as accurate as the sources available at the time of update(s)

## 2021-03-23 NOTE — PROGRESS NOTES
[facility]    Medicine Progress Note - Hospitalist Service       Date of Admission:  3/22/2021  Assessment & Plan       Cholelithiasis  Cholecystitis  Elevated LFTs  Presents with epigastric abdominal pain along with nausea/vomiting. She is afebrile.  Ultrasound of her abdomen showed cholelithiasis with a thickened gallbladder wall and positive sonographic Magana sign, worrisome for cholecystitis.  She is otherwise hemodynamically stable, nonseptic appearing.  -Admit to inpatient.  -Gastroenterology consulted.  -General surgery consulted.  -Pain control as needed.  -NPO.  -IV fluids.  -Per GI note, plan is for lap stacia with intraoperative cholangiogram, have a page out to surgery to discuss timing.     Attention deficit disorder  Major depressive disorder, single episode, mild (H)  Anxiety  -Does not appear to be on any medication as an outpatient.  -Stable, follow-up as outpatient.      Diet: NPO per Anesthesia Guidelines for Procedure/Surgery Except for: Meds    DVT Prophylaxis: Pneumatic Compression Devices  Yu Catheter: not present  Code Status: Full Code           Disposition Plan   Expected discharge: discharge in 1-2 days pending surgery today, results of cholangiogram, improvement in LFT's, consultant recommendations    Entered: Adam Small MD 03/23/2021, 10:52 AM       The patient's care was discussed with the Bedside Nurse, Patient and Surgery Consultant.    Adam Small MD  Hospitalist Service  [facility]  Contact information available via [facility] Paging/Directory    ______________________________________________________________________    Interval History   Christina RODRIGEZ Ana Luisa feels OK this morning. Mild RUQ discomfort. No fevers, chest pain, shortness of breath, nausea. No BM since admission. Had questions about plan of care which were answered, will reach out to surgery regarding timing of procedure.    Data reviewed today: I reviewed all  medications, new labs and imaging results over the last 24 hours. I personally reviewed no images or EKG's today.    Physical Exam   Vital Signs: Temp: 98.5  F (36.9  C) Temp src: Oral BP: 139/84 Pulse: 64   Resp: (P) 18 SpO2: 100 % O2 Device: None (Room air)    Weight: 0 lbs 0 oz  Constitutional: awake, alert, cooperative, no apparent distress, sitting up in bed  Respiratory: clear to auscultation bilaterally, no crackles or wheezing  Cardiovascular: regular rate and rhythm, normal S1 and S2, no murmur noted  GI: normal bowel sounds, soft, non-distended, mild RUQ tenderness  Skin: warm, dry  Musculoskeletal: no lower extremity pitting edema present  Neurologic: awake, alert, oriented to name, place and time, motor is 5 out of 5 bilaterally, sensory is intact    Data   Recent Labs   Lab 03/23/21  0743 03/22/21  0940   WBC 5.0 5.9   HGB 11.6* 12.0   MCV 82 82    189   INR 1.14  --     140   POTASSIUM 3.6 3.8   CHLORIDE 109 106   CO2 27 26   BUN 8 8   CR 0.75 0.77   ANIONGAP 6 8   RANDAL 9.1 11.1*   GLC 78 89   ALBUMIN 3.4 3.8   PROTTOTAL 6.4* 7.0   BILITOTAL 5.9* 2.8*   ALKPHOS 296* 252*   * 530*   * 1,129*   LIPASE  --  148     Medications     lactated ringers 100 mL/hr at 03/23/21 0659     - MEDICATION INSTRUCTIONS -         sodium chloride (PF)  3 mL Intracatheter Q8H     sodium chloride (PF)  3 mL Intracatheter Q8H

## 2021-03-23 NOTE — PROGRESS NOTES
GI NOTE (consult dictated)    Patient seen and examined and discussed with surgery.    Plan  Lap stacia with cholangiograms  Will hold on ERCP and plan according to surgical findings.    Aziza Russell MD  [facility]  Cell  [phone] 8 AM-5 PM  Office

## 2021-03-23 NOTE — UTILIZATION REVIEW
Admission Status; Secondary Review Determination       Under the authority of the Utilization Management Committee, the utilization review process indicated a secondary review on the above patient. The review outcome is based on review of the medical records, discussions with staff, and applying clinical experience noted on the date of the review.     (x) Inpatient Status Appropriate - This patient's medical care is consistent with medical management for inpatient care and reasonable inpatient medical practice.     RATIONALE FOR DETERMINATION   30-year-old woman presenting with abdominal pain has evidence of acute cholecystitis and acute choledocholithiasis, with elevation in liver enzymes in the thousand range and concern for obstruction in the common bile duct she requires complex evaluation and management and likely need for ERCP and surgical cholecystectomy. Ultrasound of her abdomen today shows cholelithiasis with a thickened gallbladder wall and positive sonographic Magana sign, worrisome for cholecystitis. This would need more than short stay. The expected length of stay at the time of admission was more than 2 nights because of the severity of illness, intensity of service provided, and risk for adverse outcome. Inpatient admission is appropriate.       This document was produced using voice recognition software       The information on this document is developed by the utilization review team in order for the business office to ensure compliance. This only denotes the appropriateness of proper admission status and does not reflect the quality of care rendered.   The definitions of Inpatient Status and Observation Status used in making the determination above are those provided in the CMS Coverage Manual, Chapter 1 and Chapter 6, section 70.4.   Sincerely,   TOLU ROLLINS MD   System Medical Director   Utilization Management   [facility].

## 2021-03-23 NOTE — ANESTHESIA PROCEDURE NOTES
Airway       Patient location during procedure: OR ([facility] - Operating Room or Procedural Area)       Procedure Start/Stop Times: 3/23/2021 3:02 PM  Staff -        CRNA: Dawna Grullon APRN CRNA       Performed By: CRNA  Consent for Airway        Urgency: elective  Indications and Patient Condition       Indications for airway management: sulema-procedural       Induction type:intravenous       Mask difficulty assessment: 0 - not attempted    Final Airway Details       Final airway type: endotracheal airway       Successful airway: ETT - single  Endotracheal Airway Details        ETT size (mm): 7.0       Successful intubation technique: video laryngoscopy       VL Blade Size: Jones 3       Grade View of Cords: 1       Adjucts: stylet       Position: Right       Measured from: gums/teeth       Secured at (cm): 21       Bite block used: None    Post intubation assessment        Number of attempts at approach: 1       Number of other approaches attempted: 0       Secured with: pink tape       Ease of procedure: easy       Dentition: Intact and Unchanged    Medication(s) Administered   Medication Administration Time: 3/23/2021 3:02 PM

## 2021-03-23 NOTE — CONSULTS
[facility] General Surgery Consultation    Christina Orosco MRN# [id number]   YOB: 1990 Age: 30 year old      Date of Admission:  3/22/2021  Date of Consult: 3/23/2021         Assessment and Plan:   Patient is a 30 year old female with choledocholithiasis and acute cholecystitis. Worsening LFTs this morning (bilirubin 2.8->5.9).    PLAN:  - Proceed with ERCP today. Diet per GI following procedure. NPO at midnight.  - Laparoscopic cholecystectomy tomorrow.  - The benefits and risks of surgical intervention versus non-operative management including but not limited to infection, bleeding, conversion to open, bile leak, bile duct injury, retained gallstones, injuring neighboring structures, and anesthesia complications with the patient. She expressed understanding and would like to proceed with surgery. All questions were answered to the patient's satisfaction.         Requesting Physician:              Chief Complaint:   No chief complaint on file.         History of Present Illness:   Christina Orosco is a 30 year old female who was seen in consultation at the request of Dr. Larson. Patient presented to the ED yesterday for 2 weeks of epigastric pain. The pain radiated to the back and has been worse with eating. It has been associated with nonbloody emesis. She denies diarrhea, fever, chills, and other symptoms. Labs in the ED were remarkable for elevated bilirubin 2.8, , AST 1129, and alk phos 252. White count and lipase were normal. Labs this morning with worsening hyperbilirubemia (2.8->5.9). Ultrasound in the ED revealed cholelithiasis with thickened gallbladder wall and positive olivas's sign concerning for acute cholecystitis. Bile ducts were normal in diameter.    Christina Orosco has chronic medical conditions including attention deficit disorder, major depressive disorder, and anxiety. Surgical history includes right elbow surgery and wisdom teeth extraction. She  does not have history of prior abdominal surgeries. Her mother had gallstones and cholecystectomy. Natali does not smoke tobacco or use drugs. She does drink alcohol monthly.          Physical Exam:   Blood pressure 134/82, pulse 81, temperature 97.5  F (36.4  C), temperature source Oral, resp. rate 18, SpO2 98 %, currently breastfeeding.  0 lbs 0 oz  General: Vital signs reviewed, in no apparent distress, pleasant and conversational  Eyes: Anicteric  HENT: Normocephalic, atraumatic, trachea midline   Respiratory: Breathing nonlabored  Cardiovascular: Regular rate and rhythm  GI: Abdomen soft,  Mild tenderness at RUQ. No guarding. No rebound tenderness..  Musculoskeletal: No gross deformities  Neurologic: Grossly nonfocal exam  Psychiatric: Normal mood, affect and insight  Integumentary: Warm and dry         Past Medical History:     Past Medical History:   Diagnosis Date     Infectious mononucleosis 5/06     LSIL (low grade squamous intraepithelial lesion) on Pap smear 6/2007    colp - WNL     Migraines      NONSPECIFIC MEDICAL HISTORY     rt elbow fracturem snowboarding     NONSPECIFIC MEDICAL HISTORY 9 yrs old    bilat wrist fractures due to injury            Past Surgical History:     Past Surgical History:   Procedure Laterality Date     HC TOOTH EXTRACTION W/FORCEP      wisdom teeth      SURGICAL HISTORY OF -   2003    rt elbow surgery x2 for fracture            Current Medications:           sodium chloride (PF)  3 mL Intracatheter Q8H     sodium chloride (PF)  3 mL Intracatheter Q8H       indomethacin, lidocaine 4%, lidocaine 4%, lidocaine (buffered or not buffered), lidocaine (buffered or not buffered), - MEDICATION INSTRUCTIONS -, melatonin, naloxone **OR** naloxone **OR** naloxone **OR** naloxone, ondansetron **OR** ondansetron, oxyCODONE, sodium chloride (PF), sodium chloride (PF), sodium chloride (PF), sodium chloride (PF), sodium chloride (PF)         Home Medications:     Prior to Admission  medications    Medication Sig Last Dose Taking? Auth Provider   NORLYDA 0.35 MG tablet Take 0.35 mg by mouth daily  3/21/2021 at Unknown time Yes Reported, Patient   ondansetron (ZOFRAN-ODT) 4 MG ODT tab Take 1 tablet (4 mg) by mouth every 8 hours as needed for nausea  at NEW Yes Roberta Paul PA-C   oxyCODONE (ROXICODONE) 5 MG tablet Take 1 tablet (5 mg) by mouth every 6 hours as needed for severe pain 3/22/2021 at Unknown time Yes Roberta Paul PA-C   Prenatal Vit-Fe Fumarate-FA (PRENATAL MULTIVITAMIN W/IRON) 27-0.8 MG tablet Take 1 tablet by mouth daily 3/21/2021 at Unknown time Yes Homa Roblero MD   vitamin D3 (CHOLECALCIFEROL) 50 mcg (2000 units) tablet Take 3 tablets by mouth daily 3/21/2021 at AM Yes Unknown, Entered By History            Allergies:     Allergies   Allergen Reactions     Amoxicillin Rash     Penicillin [Penicillins] Hives            Family History:     Family History   Problem Relation Age of Onset     Hypertension Mother      Lipids Mother      Depression Mother      Hypertension Father      Diabetes Father         diet controlled after weight loss     Liver Disease Father         non-alcoholic fatty liver     Cancer Maternal Grandmother         ovarian cancer, dxed in her 60's -70's ?     Hypertension Maternal Grandmother      Arthritis Maternal Grandmother      Diabetes Maternal Grandfather      Hypertension Maternal Grandfather      Heart Disease Maternal Grandfather         not sure about details     Breast Cancer No family hx of      Cancer - colorectal No family hx of            Social History:   Christina Orosco  reports that she quit smoking about 9 years ago. Her smoking use included cigarettes. She has never used smokeless tobacco. She reports current alcohol use. She reports that she does not use drugs.          Review of Systems:   Constitutional: No fevers or chills  Eyes: No blurred or double vision  HENT: Denies headaches, No rhinorrhea, No sore  throat  Respiratory: No cough or shortness of breath  Cardiovascular: Denies chest pain or palpitations  Gastrointestinal: Positive abdominal pain and nausea/vomiting  Genitourinary: No hematuria or dysuria  Musculoskeletal: Denies arthralgias or myalgias  Neurologic: No numbness or tingling  Integumentary: No skin rashes         Labs/Imaging   All new lab and imaging data was reviewed.   Recent Labs   Lab 03/22/21  0940   WBC 5.9   HGB 12.0   HCT 34.8*   MCV 82        Recent Labs   Lab 03/22/21  0940      POTASSIUM 3.8   CHLORIDE 106   CO2 26   ANIONGAP 8   GLC 89   BUN 8   CR 0.77   GFRESTIMATED >90   GFRESTBLACK >90   RANDAL 11.1*   PROTTOTAL 7.0   ALBUMIN 3.8   BILITOTAL 2.8*   ALKPHOS 252*   AST 1,129*   *     Recent Labs   Lab 03/22/21  0940   LIPASE 148       I have personally reviewed the imaging studies-   ABDOMINAL ULTRASOUND   3/22/2021 1:57 PM      FINDINGS:    Gallbladder: Cholelithiasis with thickened gallbladder wall measuring  1 cm and positive sonographic Magana's sign, findings are worrisome  for acute cholecystitis.     Bile ducts:  CHD is normal diameter.  No intrahepatic biliary  dilatation.     Liver: Demonstrates normal parenchymal echogenicity. No focal hepatic  mass visualized.     Pancreas:  Partially obscured by overlying bowel gas,  but grossly  unremarkable.      Right kidney:  No hydronephrosis or shadowing calculi.     Aorta and IVC:  Not specifically assessed.                                                                       IMPRESSION:  Cholelithiasis with thickened gallbladder wall and  positive sonographic Magana's sign, findings are worrisome for acute  cholecystitis. Can be confirmed with HIDA scan if clinically  warranted.      Cody Caballero PA-C  General Surgery  03/23/2021

## 2021-03-23 NOTE — PROGRESS NOTES
Called to inform direct admit patient about room availability, as requested by charge RN. Patient reluctant to arrive this evening after learning of visitor limitations, regarding times. She was promised an overnight visitor, she states.    Patient questioned time of surgery. Writer informed that this information was not available and that admission was currently established to treat elevated labs, as noted in clinic note.    Patient has called back x2, charge RN aware. Patient called to finalize that she will be arriving shortly, as soon as her mother arrives to provide her transportation.  Updated door 6 of patient's arrival

## 2021-03-23 NOTE — ANESTHESIA CARE TRANSFER NOTE
Patient: Christina Orosco    Procedure(s):  ENDOSCOPIC Ultrasound  ENDOSCOPIC RETROGRADE CHOLANGIOPANCREATOGRAPHY, WITH STONE EXTRACTION AND SPHINCTEROTOMY    Diagnosis: Choledocholithiasis [K80.50]  Diagnosis Additional Information: No value filed.    Anesthesia Type:   General     Note:    Oropharynx: oropharynx clear of all foreign objects and spontaneously breathing  Level of Consciousness: awake  Oxygen Supplementation: nasal cannula  Level of Supplemental Oxygen (L/min / FiO2): 2  Independent Airway: airway patency satisfactory and stable  Dentition: dentition unchanged  Vital Signs Stable: post-procedure vital signs reviewed and stable  Report to RN Given: handoff report given  Patient transferred to: PACU    Handoff Report: Identifed the Patient, Identified the Reponsible Provider, Reviewed the pertinent medical history, Discussed the surgical course, Reviewed Intra-OP anesthesia mangement and issues during anesthesia, Set expectations for post-procedure period and Allowed opportunity for questions and acknowledgement of understanding      Vitals: (Last set prior to Anesthesia Care Transfer)  CRNA VITALS  3/23/2021 1501 - 3/23/2021 1536      3/23/2021             Pulse:  112    SpO2:  100 %    Resp Rate (set):  10        Electronically Signed By: BONNY Downs CRNA  March 23, 2021  3:36 PM

## 2021-03-23 NOTE — ANESTHESIA POSTPROCEDURE EVALUATION
Patient: Christina Orosco    Procedure(s):  ENDOSCOPIC Ultrasound  ENDOSCOPIC RETROGRADE CHOLANGIOPANCREATOGRAPHY, WITH STONE EXTRACTION AND SPHINCTEROTOMY    Diagnosis:Choledocholithiasis [K80.50]  Diagnosis Additional Information: No value filed.    Anesthesia Type:  General    Note:     Postop Pain Control: Uneventful            Sign Out: Well controlled pain   PONV: No   Neuro/Psych: Uneventful            Sign Out: Acceptable/Baseline neuro status   Airway/Respiratory: Uneventful            Sign Out: Acceptable/Baseline resp. status   CV/Hemodynamics: Uneventful            Sign Out: Acceptable CV status   Other NRE: NONE   DID A NON-ROUTINE EVENT OCCUR? No         Last vitals:  Vitals:    03/23/21 1600 03/23/21 1610 03/23/21 1620   BP: 136/82 (!) 141/81 132/84   Pulse: 68 53 60   Resp: 15 15 19   Temp:      SpO2: 99% 99% 96%       Last vitals prior to Anesthesia Care Transfer:  CRNA VITALS  3/23/2021 1501 - 3/23/2021 1601      3/23/2021             Pulse:  112    SpO2:  100 %    Resp Rate (set):  10          Electronically Signed By: Cleopatra Newman MD  March 23, 2021  4:26 PM

## 2021-03-23 NOTE — PLAN OF CARE
Cognitive Concerns/ Orientation: A&Ox4, calm & cooperative   BEHAVIOR & AGGRESSION TOOL COLOR: Green  CIWA SCORE: N/A  ABNL VS/O2: VSS on RA  MOBILITY: Independent- up ad kosta in the room  PAIN MANAGMENT: oxycodone given this am for headache  DIET: NPO  BOWEL/BLADDER: Continent, up to BR   ABNL LAB/BG: Bili 5.9, ,   DRAIN/DEVICES: L arm PIV   TELEMETRY RHYTHM: N/A  SKIN: WDL  TESTS/PROCEDURES: left at 1400 for ERCP  D/C DAY/GOALS/PLACE: 1-2 days  OTHER IMPORTANT INFo: Zofran given for nausea x1. Mother visited today and was updated

## 2021-03-23 NOTE — PROGRESS NOTES
Surgery    Full note to follow.  Patient is seemingly set up for ERCP today with Dr. Aponte.  Will defer laparoscopic cholecystectomy until tomorrow.  Patient understands and agrees with plan.    Cody Caballero PA-C  Office: [phone]  Pager: [phone]

## 2021-03-23 NOTE — PLAN OF CARE
DATE & TIME: 3/22/21 admission 2000-0730  Cognitive Concerns/ Orientation : A&O x4, calm & cooperative   BEHAVIOR & AGGRESSION TOOL COLOR: Green  CIWA SCORE: N/A  ABNL VS/O2: VSS on RA  MOBILITY: Independent, steady gait   PAIN MANAGMENT: Pt reports 2/10 right/medial abdominal pain, PRN Oxy available   DIET: Clear liquids, NPO at midnight   BOWEL/BLADDER: Continent, up to BR   ABNL LAB/BG: Bili 2.8, , AST 1,129  DRAIN/DEVICES: L arm PIV infusing LR@100mL/hr  TELEMETRY RHYTHM: N/A  SKIN: WNL  TESTS/PROCEDURES: Pending consults  D/C DAY/GOALS/PLACE: Pending improvement of MD junito note states 1-2 days. Pt expressed frustration over this due to having 3 kids at home  OTHER IMPORTANT INFO: PRN Zofran given for nausea. Pt following home pumping schedule, 4 months post-partum. Pt has received COVID vaccine. GI and surgery consult.

## 2021-03-23 NOTE — ANESTHESIA PREPROCEDURE EVALUATION
Anesthesia Pre-Procedure Evaluation    Patient: Christina Orosco   MRN: [id number] : 1990        Preoperative Diagnosis: Choledocholithiasis [K80.50]   Procedure : Procedure(s):  ENDOSCOPIC Ultrasound  POSSIBLE ENDOSCOPIC RETROGRADE CHOLANGIOPANCREATOGRAPHY     Past Medical History:   Diagnosis Date     Infectious mononucleosis      LSIL (low grade squamous intraepithelial lesion) on Pap smear 2007    colp - WNL     Migraines      NONSPECIFIC MEDICAL HISTORY     rt elbow fracturem snowboarding     NONSPECIFIC MEDICAL HISTORY 9 yrs old    bilat wrist fractures due to injury      Past Surgical History:   Procedure Laterality Date     HC TOOTH EXTRACTION W/FORCEP      wisdom teeth      SURGICAL HISTORY OF -       rt elbow surgery x2 for fracture      Allergies   Allergen Reactions     Amoxicillin Rash     Penicillin [Penicillins] Hives      Social History     Tobacco Use     Smoking status: Former Smoker     Types: Cigarettes     Quit date: 2011     Years since quittin.8     Smokeless tobacco: Never Used   Substance Use Topics     Alcohol use: Yes     Frequency: Monthly or less     Drinks per session: 1 or 2      Wt Readings from Last 1 Encounters:   21 70.3 kg (155 lb)        Anesthesia Evaluation   Pt has had prior anesthetic.         ROS/MED HX  ENT/Pulmonary: Comment: TMJ synd   (-) sleep apnea   Neurologic: Comment: ADD    (+) migraines,  (-) no seizures   Cardiovascular:       METS/Exercise Tolerance: >4 METS    Hematologic:       Musculoskeletal:       GI/Hepatic: Comment: Choledocholithiasis     (+) cholecystitis/cholelithiasis, liver disease,     Renal/Genitourinary:       Endo:       Psychiatric/Substance Use:     (+) psychiatric history anxiety and depression     Infectious Disease:       Malignancy:       Other:            Physical Exam    Airway        Mallampati: II   TM distance: > 3 FB   Neck ROM: full   Mouth opening: > 3 cm    Respiratory Devices and Support          Dental  no notable dental history         Cardiovascular          Rhythm and rate: regular     Pulmonary           breath sounds clear to auscultation           OUTSIDE LABS:  CBC:   Lab Results   Component Value Date    WBC 5.0 03/23/2021    WBC 5.9 03/22/2021    HGB 11.6 (L) 03/23/2021    HGB 12.0 03/22/2021    HCT 34.7 (L) 03/23/2021    HCT 34.8 (L) 03/22/2021     03/23/2021     03/22/2021     BMP:   Lab Results   Component Value Date     03/23/2021     03/22/2021    POTASSIUM 3.6 03/23/2021    POTASSIUM 3.8 03/22/2021    CHLORIDE 109 03/23/2021    CHLORIDE 106 03/22/2021    CO2 27 03/23/2021    CO2 26 03/22/2021    BUN 8 03/23/2021    BUN 8 03/22/2021    CR 0.75 03/23/2021    CR 0.77 03/22/2021    GLC 78 03/23/2021    GLC 89 03/22/2021     COAGS:   Lab Results   Component Value Date    INR 1.14 03/23/2021     POC:   Lab Results   Component Value Date    HCG Negative 03/23/2021     HEPATIC:   Lab Results   Component Value Date    ALBUMIN 3.4 03/23/2021    PROTTOTAL 6.4 (L) 03/23/2021     (HH) 03/23/2021     (HH) 03/23/2021    ALKPHOS 296 (H) 03/23/2021    BILITOTAL 5.9 (H) 03/23/2021     OTHER:   Lab Results   Component Value Date    RANDAL 9.1 03/23/2021    LIPASE 148 03/22/2021    TSH 1.41 01/14/2020     Prior to Admission medications    Medication Sig Start Date End Date Taking? Authorizing Provider   NORLYDA 0.35 MG tablet Take 0.35 mg by mouth daily  1/8/21  Yes Reported, Patient   ondansetron (ZOFRAN-ODT) 4 MG ODT tab Take 1 tablet (4 mg) by mouth every 8 hours as needed for nausea 3/22/21  Yes Roberta Paul PA-C   oxyCODONE (ROXICODONE) 5 MG tablet Take 1 tablet (5 mg) by mouth every 6 hours as needed for severe pain 3/22/21 3/25/21 Yes Roberta Paul PA-C   Prenatal Vit-Fe Fumarate-FA (PRENATAL MULTIVITAMIN W/IRON) 27-0.8 MG tablet Take 1 tablet by mouth daily 5/28/19  Yes Homa Roblero MD   vitamin D3 (CHOLECALCIFEROL) 50 mcg (2000 units) tablet Take 3  tablets by mouth daily   Yes Unknown, Entered By History     Current Facility-Administered Medications Ordered in Epic   Medication Dose Route Frequency Last Rate Last Admin     ertapenem (INVanz) 1 g vial to attach to  mL bag  1 g Intravenous Pre-Op/Pre-procedure x 1 dose         indomethacin (INDOCIN) 50 MG Suppository 100 mg  100 mg Rectal Once PRN         lactated ringers infusion   Intravenous Continuous 100 mL/hr at 03/23/21 0659 New Bag at 03/23/21 0659     lidocaine (LMX4) cream   Topical Q1H PRN         [Auto Hold] lidocaine (LMX4) cream   Topical Q1H PRN         lidocaine 1 % 0.1-1 mL  0.1-1 mL Other Q1H PRN         [Auto Hold] lidocaine 1 % 0.1-1 mL  0.1-1 mL Other Q1H PRN         May continue current IV fluids if patient has IV fluids infusing.   Does not apply Continuous PRN         [Auto Hold] melatonin tablet 1 mg  1 mg Oral At Bedtime PRN         [Auto Hold] naloxone (NARCAN) injection 0.2 mg  0.2 mg Intravenous Q2 Min PRN        Or     [Auto Hold] naloxone (NARCAN) injection 0.4 mg  0.4 mg Intravenous Q2 Min PRN        Or     [Auto Hold] naloxone (NARCAN) injection 0.2 mg  0.2 mg Intramuscular Q2 Min PRN        Or     [Auto Hold] naloxone (NARCAN) injection 0.4 mg  0.4 mg Intramuscular Q2 Min PRN         [Auto Hold] ondansetron (ZOFRAN-ODT) ODT tab 4 mg  4 mg Oral Q6H PRN        Or     [Auto Hold] ondansetron (ZOFRAN) injection 4 mg  4 mg Intravenous Q6H PRN   4 mg at 03/23/21 1230     [Auto Hold] oxyCODONE (ROXICODONE) tablet 5 mg  5 mg Oral Q6H PRN   5 mg at 03/23/21 0735     Provider ordered ALTERNATE pre op antibiotic.  1 each As instructed Continuous         sodium chloride (PF) 0.9% PF flush 3 mL  3 mL Intracatheter Q8H         [Auto Hold] sodium chloride (PF) 0.9% PF flush 3 mL  3 mL Intracatheter Q1H PRN         sodium chloride (PF) 0.9% PF flush 3 mL  3 mL Intracatheter q1 min prn         sodium chloride (PF) 0.9% PF flush 3 mL  3 mL Intravenous q1 min prn         [Auto Hold] sodium  chloride (PF) 0.9% PF flush 3 mL  3 mL Intracatheter Q8H         [Auto Hold] sodium chloride (PF) 0.9% PF flush 3 mL  3 mL Intracatheter Q1H PRN         [Auto Hold] sodium chloride (PF) 0.9% PF flush 3 mL  3 mL Intracatheter q1 min prn         No current [facility]-ordered outpatient medications on file.       lactated ringers 100 mL/hr at 03/23/21 0659     - MEDICATION INSTRUCTIONS -       Another Antibiotic has been ordered.       Recent Labs   Lab Test 11/11/20  1445   ABO B   RH Pos     Recent Labs   Lab Test 03/23/21  0810   HCG Negative     Recent Results (from the past 744 hour(s))   US Abdomen Limited    Narrative    TEMPORARY   3/22/2021 1:57 PM     HISTORY:  Epigastric and right upper quadrant abdominal pain.    COMPARISON: None available    FINDINGS:    Gallbladder: Cholelithiasis with thickened gallbladder wall measuring  1 cm and positive sonographic Magana's sign, findings are worrisome  for acute cholecystitis.    Bile ducts:  CHD is normal diameter.  No intrahepatic biliary  dilatation.    Liver: Demonstrates normal parenchymal echogenicity. No focal hepatic  mass visualized.    Pancreas:  Partially obscured by overlying bowel gas,  but grossly  unremarkable.     Right kidney:  No hydronephrosis or shadowing calculi.    Aorta and IVC:  Not specifically assessed.       Impression    IMPRESSION:  Cholelithiasis with thickened gallbladder wall and  positive sonographic Magana's sign, findings are worrisome for acute  cholecystitis. Can be confirmed with HIDA scan if clinically  warranted.    Findings were communicated to the ordering provider RHETT PARISI  by the ultrasound technologist at 1:57 PM on 3/22/2021.    JANA COFFMAN MD       Anesthesia Plan    ASA Status:  2      Anesthesia Type: General.     - Airway: ETT              Consents    Anesthesia Plan(s) and associated risks, benefits, and realistic alternatives discussed. Questions answered and patient/representative(s) expressed  understanding.     - Discussed with:  Patient         Postoperative Care       PONV prophylaxis: Ondansetron (or other 5HT-3), Dexamethasone or Solumedrol, Background Propofol Infusion     Comments:    No suggamadex please            Cleopatra Newman MD

## 2021-03-23 NOTE — H&P
[facility]    History and Physical - Hospitalist Service       Date of Admission:  3/22/2021    Assessment & Plan   Christina Orosco is a 30 year old female admitted on 3/22/2021. She presents with abdominal pain.         Cholelithiasis    Cholecystitis    Assessment: Presents with epigastric abdominal pain along with nausea/vomiting.  She is afebrile.  No ultrasound of her abdomen today shows cholelithiasis with a thickened gallbladder wall and positive sonographic Magana sign, worrisome for cholecystitis.  She is otherwise hemodynamically stable, nonseptic appearing.    Plan:   -Admit inpatient  -Gastroenterology consult  -General surgery consult  -Pain control as needed  -Follow vitals/temp  -N.p.o. midnight      Attention deficit disorder    Major depressive disorder, single episode, mild (H)    Anxiety    Assessment/Plan: Stable, follows outpatient      Obesity    Assessment/Plan: Follow as outpatient       Diet: Clear Liquid Diet  NPO for Medical/Clinical Reasons Except for: Meds, Ice Chips    DVT Prophylaxis: Pneumatic Compression Devices  Yu Catheter: not present  Code Status: Full Code           Disposition Plan   Expected discharge: 2 - 3 days, recommended to prior living arrangement once adequate pain management/ tolerating PO medications and GI work up completed.  Entered: Dharmesh Larson MD 03/22/2021, 8:39 PM     The patient's care was discussed with the Patient and ED Provider.    Dharmesh Larson MD  [facility]  Contact information available via [facility] Paging/Directory      ______________________________________________________________________    Chief Complaint     Abdominal Pain    History is obtained from the patient    History of Present Illness     Christina Orosco is a 30 year old female with past medical history of obesity who presents for evaluation of abdominal pain.     Patient reports that for the past 2 weeks, she has had epigastric discomfort  radiating into her back.  It has continued to persist, making her have significant difficulties doing daily activities including taking care of her .  Her pain has also been keeping her up at night.  She feels nauseous with few episodes of nonbloody emesis over the past several days.  She is amenable p.o. intake over the past day.  Eating worsens her abdominal pain, and she endorses feeling abdominal distention.  Her mother did have a history of gallstones and needed her gallbladder removed.  She otherwise denies any fevers or chills, she has no chest pain or shortness of breath, no bloody stools, no weight loss or night sweats.  She is taking acetaminophen and Pepcid at home with mild improvement in her symptoms.  She took a home pregnancy test roughly 2 weeks ago.  She is roughly 4 months postpartum.  She denies any urinary complaints of urgency or frequency, she has no hematuria or dysuria.  She denies any flank pain.  She has no other complaints this time.    Review of Systems      The 10 point Review of Systems is negative other than noted in the HPI or here.     Past Medical History    I have reviewed this patient's medical history and updated it with pertinent information if needed.   Past Medical History:   Diagnosis Date     Infectious mononucleosis      LSIL (low grade squamous intraepithelial lesion) on Pap smear 2007    colp - WNL     Migraines      NONSPECIFIC MEDICAL HISTORY     rt elbow fracturem snowboarding     NONSPECIFIC MEDICAL HISTORY 9 yrs old    bilat wrist fractures due to injury       Past Surgical History   I have reviewed this patient's surgical history and updated it with pertinent information if needed.  Past Surgical History:   Procedure Laterality Date     HC TOOTH EXTRACTION W/FORCEP      wisdom teeth      SURGICAL HISTORY OF -       rt elbow surgery x2 for fracture       Social History   I have reviewed this patient's social history and updated it with pertinent  information if needed.  Social History     Tobacco Use     Smoking status: Former Smoker     Types: Cigarettes     Quit date: 2011     Years since quittin.8     Smokeless tobacco: Never Used   Substance Use Topics     Alcohol use: Yes     Frequency: Monthly or less     Drinks per session: 1 or 2     Drug use: No       Family History   I have reviewed this patient's family history and updated it with pertinent information if needed.  Family History   Problem Relation Age of Onset     Hypertension Mother      Lipids Mother      Depression Mother      Hypertension Father      Diabetes Father         diet controlled after weight loss     Liver Disease Father         non-alcoholic fatty liver     Cancer Maternal Grandmother         ovarian cancer, dxed in her 60's -70's ?     Hypertension Maternal Grandmother      Arthritis Maternal Grandmother      Diabetes Maternal Grandfather      Hypertension Maternal Grandfather      Heart Disease Maternal Grandfather         not sure about details     Breast Cancer No family hx of      Cancer - colorectal No family hx of        Prior to Admission Medications   Prior to Admission Medications   Prescriptions Last Dose Informant Patient Reported? Taking?   NORLYDA 0.35 MG tablet 3/21/2021 at Unknown time  Yes Yes   Sig: Take 0.35 mg by mouth daily    Prenatal Vit-Fe Fumarate-FA (PRENATAL MULTIVITAMIN W/IRON) 27-0.8 MG tablet 3/21/2021 at Unknown time  No Yes   Sig: Take 1 tablet by mouth daily   ondansetron (ZOFRAN-ODT) 4 MG ODT tab  at NEW  No Yes   Sig: Take 1 tablet (4 mg) by mouth every 8 hours as needed for nausea   oxyCODONE (ROXICODONE) 5 MG tablet 3/22/2021 at Unknown time  No Yes   Sig: Take 1 tablet (5 mg) by mouth every 6 hours as needed for severe pain   vitamin D3 (CHOLECALCIFEROL) 50 mcg (2000 units) tablet 3/21/2021 at AM  Yes Yes   Sig: Take 3 tablets by mouth daily      Facility-Administered Medications: None     Allergies   Allergies   Allergen Reactions      Amoxicillin Rash     Penicillin [Penicillins] Hives       Physical Exam   Vital Signs: Temp: 97.5  F (36.4  C) Temp src: Oral BP: 133/78 Pulse: 75   Resp: 18 SpO2: 97 % O2 Device: None (Room air)    Weight: 0 lbs 0 oz    Constitutional: awake, alert, cooperative, no apparent distress.   Eyes: Lids and lashes normal, pupils equal, round and reactive to light   ENT: Normocephalic, without obvious abnormality, atraumatic, sinuses nontender on palpation   Hematologic / Lymphatic: no cervical lymphadenopathy   Respiratory: CTABL   Cardiovascular: RRR with no m/r/g   GI: Normal bowel sounds, soft, non-distended, non-tender.   Skin: normal skin color, texture, turgor   Musculoskeletal: There is no redness, warmth, or swelling of the joints. Full range of motion noted.   Neurologic: Awake, alert, oriented to name, place and time. Cranial nerves II-XII are grossly intact. Motor is 5 out of 5 bilaterally. Sensory is intact.   Neuropsychiatric: normal mood and affect    Data   Data reviewed today: I reviewed all medications, new labs and imaging results over the last 24 hours. I personally reviewed the US ABdomen image(s) showing see below.    US ABDOMEN  IMPRESSION:  Cholelithiasis with thickened gallbladder wall and  positive sonographic Magana's sign, findings are worrisome for acute  cholecystitis. Can be confirmed with HIDA scan if clinically  warranted.    Most Recent 3 CBC's:  Recent Labs   Lab Test 03/22/21  0940 11/11/20  1445 05/28/19  1125   WBC 5.9 9.4 11.1*   HGB 12.0 11.3* 13.5   MCV 82 91 87    134* 168     Most Recent 3 BMP's:  Recent Labs   Lab Test 03/22/21  0940 11/11/20  1445 01/14/20  1500    138 139   POTASSIUM 3.8 3.8 3.8   CHLORIDE 106 107 108   CO2 26 20 22   BUN 8 9 11   CR 0.77 0.58 0.74   ANIONGAP 8 11 9   RANDAL 11.1* 8.8 9.4   GLC 89 86 91     Most Recent 2 LFT's:  Recent Labs   Lab Test 03/22/21  0940 11/11/20  1445   AST 1,129* 19   * 15   ALKPHOS 252*  --    BILITOTAL 2.8*  --       Most Recent 3 INR's:No lab results found.  Recent Results (from the past 24 hour(s))   US Abdomen Limited    Narrative    TEMPORARY   3/22/2021 1:57 PM     HISTORY:  Epigastric and right upper quadrant abdominal pain.    COMPARISON: None available    FINDINGS:    Gallbladder: Cholelithiasis with thickened gallbladder wall measuring  1 cm and positive sonographic Magana's sign, findings are worrisome  for acute cholecystitis.    Bile ducts:  CHD is normal diameter.  No intrahepatic biliary  dilatation.    Liver: Demonstrates normal parenchymal echogenicity. No focal hepatic  mass visualized.    Pancreas:  Partially obscured by overlying bowel gas,  but grossly  unremarkable.     Right kidney:  No hydronephrosis or shadowing calculi.    Aorta and IVC:  Not specifically assessed.       Impression    IMPRESSION:  Cholelithiasis with thickened gallbladder wall and  positive sonographic Magana's sign, findings are worrisome for acute  cholecystitis. Can be confirmed with HIDA scan if clinically  warranted.    Findings were communicated to the ordering provider RHETT PARISI  by the ultrasound technologist at 1:57 PM on 3/22/2021.    JANA COFFMAN MD

## 2021-03-23 NOTE — CONSULTS
Consult Date:  03/23/2021      GASTROENTEROLOGY CONSULTATION      CHIEF COMPLAINT:  Abdominal pain and cholelithiasis with abnormal LFTs.      HISTORY OF PRESENT ILLNESS:  The patient is a very pleasant 30-year-old postpartum woman, who we were asked to see by Dr. Dharmesh Larson for further evaluation of abdominal pain and elevated liver function tests.      The patient reports that she over the past 2 weeks has had epigastric comfort radiating to her back.  Her pain has been keeping her up at night, seems worse with eating.  She has had nausea and vomiting, but no hematemesis.  No fever or chills.  She presented to the emergency room for further evaluation, and a right upper quadrant ultrasound revealed cholelithiasis.  The patient is roughly 4 months postpartum.      REVIEW OF SYSTEMS:   CONSTITUTIONAL:  She has not had fever or chills.  She has had no weight loss.  She has had nausea and vomiting.   CARDIAC:  No chest pain.   PULMONARY:  No shortness of breath.   GASTROINTESTINAL:  As above.   GENITOURINARY:  She is urinating normally.      All other systems were negative.      PAST MEDICAL HISTORY:  Significant for attention deficit disorder, infectious mononucleosis.      PAST SURGICAL HISTORY:  Right elbow surgery and wisdom teeth removal.      SOCIAL HISTORY:  She quit smoking in 2011.  She does drink alcohol, monthly or less.  No drug use.      FAMILY HISTORY:  Significant for hypertension and depression in her mother and also cholelithiasis.  Hypertension and diabetes in her father.      MEDICATIONS PRIOR TO ADMISSION:  Included Norlyda, prenatal vitamin, Zofran, oxycodone, and vitamin D.      ALLERGIES:  AMOXICILLIN AND PENICILLIN.      PHYSICAL EXAMINATION:   GENERAL:  She is a well-nourished woman, alert and oriented, in no apparent distress.   VITAL SIGNS:  Temperature is 97.5, blood pressure is 133/78, pulse 75.   HEENT:  Head is atraumatic, normocephalic.   SKIN:  Without evidence of jaundice or rash.   Sclerae nonicteric.   HEART:  S1, S2.   LUNGS:  Clear to auscultation.   ABDOMEN:  Soft and nontender.  No hepatosplenomegaly.  No masses.   EXTREMITIES:  Right and left lower extremities without edema.   NEUROLOGIC:  She is alert and oriented.  Grossly nonfocal.   PSYCHIATRIC:  No evidence of pressured speech or flattened affect.      LABORATORY DATA:  Electrolytes are within normal limits.  Her bilirubin is 5.9 with alkaline phosphatase of 296.  ALT is 844 and AST is 719.  COVID is negative.  Ultrasound of the abdomen shows cholelithiasis with thickened gallbladder.  Bile ducts were normal diameter.  No intrahepatic biliary dilation.      ASSESSMENT:  Cholelithiasis with a possible common bile duct stone with elevated LFTs.  This patient has evidence of cholelithiasis and cholecystitis that certainly could elevate her liver function tests.  Her common bile duct is normal, so it is possible a stone has passed through or is still present.        PLAN:  After discussion with Surgery, they wish to proceed with laparoscopic cholecystectomy and intraoperative cholangiogram.  If there is stone in the common bile duct, we would proceed to ERCP with sphincterotomy and stone removal.  We will await the findings from surgery and continue to follow this patient.      Thank you for asking us to participate in her care.         AMADO MENDENHALL MD             D: 2021   T: 2021   MT: KACY      Name:     MAYDA MADISON   MRN:      [id number]        Account:       [id number]   :      1990           Consult Date:  2021      Document: [id number]

## 2021-03-24 ENCOUNTER — ANESTHESIA EVENT (OUTPATIENT)
Dept: SURGERY | Facility: CLINIC | Age: 31
End: 2021-03-24
Payer: COMMERCIAL

## 2021-03-24 ENCOUNTER — ANESTHESIA (OUTPATIENT)
Dept: SURGERY | Facility: CLINIC | Age: 31
End: 2021-03-24
Payer: COMMERCIAL

## 2021-03-24 ENCOUNTER — APPOINTMENT (OUTPATIENT)
Dept: SURGERY | Facility: PHYSICIAN GROUP | Age: 31
End: 2021-03-24
Payer: COMMERCIAL

## 2021-03-24 LAB
ABO + RH BLD: NORMAL
ABO + RH BLD: NORMAL
ALBUMIN SERPL-MCNC: 3.2 G/DL (ref 3.4–5)
ALP SERPL-CCNC: 264 U/L (ref 40–150)
ALT SERPL W P-5'-P-CCNC: 598 U/L (ref 0–50)
ANION GAP SERPL CALCULATED.3IONS-SCNC: 5 MMOL/L (ref 3–14)
AST SERPL W P-5'-P-CCNC: 270 U/L (ref 0–45)
BILIRUB SERPL-MCNC: 1.7 MG/DL (ref 0.2–1.3)
BLD GP AB SCN SERPL QL: NORMAL
BLOOD BANK CMNT PATIENT-IMP: NORMAL
BUN SERPL-MCNC: 7 MG/DL (ref 7–30)
CALCIUM SERPL-MCNC: 8.8 MG/DL (ref 8.5–10.1)
CHLORIDE SERPL-SCNC: 108 MMOL/L (ref 94–109)
CO2 SERPL-SCNC: 27 MMOL/L (ref 20–32)
CREAT SERPL-MCNC: 0.75 MG/DL (ref 0.52–1.04)
ERYTHROCYTE [DISTWIDTH] IN BLOOD BY AUTOMATED COUNT: 13.9 % (ref 10–15)
ERYTHROCYTE [DISTWIDTH] IN BLOOD BY AUTOMATED COUNT: 14 % (ref 10–15)
GFR SERPL CREATININE-BSD FRML MDRD: >90 ML/MIN/{1.73_M2}
GLUCOSE SERPL-MCNC: 79 MG/DL (ref 70–99)
HCG UR QL: NEGATIVE
HCT VFR BLD AUTO: 33.2 % (ref 35–47)
HCT VFR BLD AUTO: 33.3 % (ref 35–47)
HGB BLD-MCNC: 11.1 G/DL (ref 11.7–15.7)
HGB BLD-MCNC: 11.3 G/DL (ref 11.7–15.7)
MCH RBC QN AUTO: 27.3 PG (ref 26.5–33)
MCH RBC QN AUTO: 28 PG (ref 26.5–33)
MCHC RBC AUTO-ENTMCNC: 33.3 G/DL (ref 31.5–36.5)
MCHC RBC AUTO-ENTMCNC: 34 G/DL (ref 31.5–36.5)
MCV RBC AUTO: 82 FL (ref 78–100)
MCV RBC AUTO: 82 FL (ref 78–100)
PLATELET # BLD AUTO: 186 10E9/L (ref 150–450)
PLATELET # BLD AUTO: 189 10E9/L (ref 150–450)
POTASSIUM SERPL-SCNC: 3.5 MMOL/L (ref 3.4–5.3)
PROT SERPL-MCNC: 6.2 G/DL (ref 6.8–8.8)
RBC # BLD AUTO: 4.04 10E12/L (ref 3.8–5.2)
RBC # BLD AUTO: 4.06 10E12/L (ref 3.8–5.2)
SODIUM SERPL-SCNC: 140 MMOL/L (ref 133–144)
SPECIMEN EXP DATE BLD: NORMAL
WBC # BLD AUTO: 5.4 10E9/L (ref 4–11)
WBC # BLD AUTO: 6.4 10E9/L (ref 4–11)

## 2021-03-24 PROCEDURE — 258N000003 HC RX IP 258 OP 636: Performed by: ANESTHESIOLOGY

## 2021-03-24 PROCEDURE — 85027 COMPLETE CBC AUTOMATED: CPT | Performed by: STUDENT IN AN ORGANIZED HEALTH CARE EDUCATION/TRAINING PROGRAM

## 2021-03-24 PROCEDURE — 80053 COMPREHEN METABOLIC PANEL: CPT | Performed by: HOSPITALIST

## 2021-03-24 PROCEDURE — 47562 LAPAROSCOPIC CHOLECYSTECTOMY: CPT | Mod: AS | Performed by: PHYSICIAN ASSISTANT

## 2021-03-24 PROCEDURE — 258N000001 HC RX 258: Performed by: SURGERY

## 2021-03-24 PROCEDURE — 86850 RBC ANTIBODY SCREEN: CPT | Performed by: STUDENT IN AN ORGANIZED HEALTH CARE EDUCATION/TRAINING PROGRAM

## 2021-03-24 PROCEDURE — 250N000011 HC RX IP 250 OP 636: Performed by: ANESTHESIOLOGY

## 2021-03-24 PROCEDURE — 85027 COMPLETE CBC AUTOMATED: CPT | Performed by: HOSPITALIST

## 2021-03-24 PROCEDURE — 272N000001 HC OR GENERAL SUPPLY STERILE: Performed by: SURGERY

## 2021-03-24 PROCEDURE — 47562 LAPAROSCOPIC CHOLECYSTECTOMY: CPT | Performed by: SURGERY

## 2021-03-24 PROCEDURE — 360N000076 HC SURGERY LEVEL 3, PER MIN: Performed by: SURGERY

## 2021-03-24 PROCEDURE — 250N000013 HC RX MED GY IP 250 OP 250 PS 637: Performed by: PHYSICIAN ASSISTANT

## 2021-03-24 PROCEDURE — 250N000009 HC RX 250: Performed by: SURGERY

## 2021-03-24 PROCEDURE — 36415 COLL VENOUS BLD VENIPUNCTURE: CPT | Performed by: HOSPITALIST

## 2021-03-24 PROCEDURE — 250N000011 HC RX IP 250 OP 636: Performed by: NURSE ANESTHETIST, CERTIFIED REGISTERED

## 2021-03-24 PROCEDURE — 88304 TISSUE EXAM BY PATHOLOGIST: CPT | Mod: 26 | Performed by: PATHOLOGY

## 2021-03-24 PROCEDURE — 81025 URINE PREGNANCY TEST: CPT | Performed by: ANESTHESIOLOGY

## 2021-03-24 PROCEDURE — 250N000011 HC RX IP 250 OP 636: Performed by: PHYSICIAN ASSISTANT

## 2021-03-24 PROCEDURE — 0FT44ZZ RESECTION OF GALLBLADDER, PERCUTANEOUS ENDOSCOPIC APPROACH: ICD-10-PCS | Performed by: SURGERY

## 2021-03-24 PROCEDURE — 250N000009 HC RX 250: Performed by: NURSE ANESTHETIST, CERTIFIED REGISTERED

## 2021-03-24 PROCEDURE — 120N000001 HC R&B MED SURG/OB

## 2021-03-24 PROCEDURE — 250N000013 HC RX MED GY IP 250 OP 250 PS 637: Performed by: HOSPITALIST

## 2021-03-24 PROCEDURE — 999N000141 HC STATISTIC PRE-PROCEDURE NURSING ASSESSMENT: Performed by: SURGERY

## 2021-03-24 PROCEDURE — 86900 BLOOD TYPING SEROLOGIC ABO: CPT | Performed by: STUDENT IN AN ORGANIZED HEALTH CARE EDUCATION/TRAINING PROGRAM

## 2021-03-24 PROCEDURE — 258N000003 HC RX IP 258 OP 636: Performed by: STUDENT IN AN ORGANIZED HEALTH CARE EDUCATION/TRAINING PROGRAM

## 2021-03-24 PROCEDURE — 88304 TISSUE EXAM BY PATHOLOGIST: CPT | Mod: TC | Performed by: SURGERY

## 2021-03-24 PROCEDURE — P9041 ALBUMIN (HUMAN),5%, 50ML: HCPCS | Performed by: NURSE ANESTHETIST, CERTIFIED REGISTERED

## 2021-03-24 PROCEDURE — 99232 SBSQ HOSP IP/OBS MODERATE 35: CPT | Performed by: HOSPITALIST

## 2021-03-24 PROCEDURE — 250N000011 HC RX IP 250 OP 636

## 2021-03-24 PROCEDURE — 0F944ZX DRAINAGE OF GALLBLADDER, PERCUTANEOUS ENDOSCOPIC APPROACH, DIAGNOSTIC: ICD-10-PCS | Performed by: SURGERY

## 2021-03-24 PROCEDURE — 370N000017 HC ANESTHESIA TECHNICAL FEE, PER MIN: Performed by: SURGERY

## 2021-03-24 PROCEDURE — 710N000009 HC RECOVERY PHASE 1, LEVEL 1, PER MIN: Performed by: SURGERY

## 2021-03-24 PROCEDURE — 86901 BLOOD TYPING SEROLOGIC RH(D): CPT | Performed by: STUDENT IN AN ORGANIZED HEALTH CARE EDUCATION/TRAINING PROGRAM

## 2021-03-24 PROCEDURE — 250N000025 HC SEVOFLURANE, PER MIN: Performed by: SURGERY

## 2021-03-24 PROCEDURE — 250N000009 HC RX 250

## 2021-03-24 RX ORDER — MAGNESIUM HYDROXIDE 1200 MG/15ML
LIQUID ORAL PRN
Status: DISCONTINUED | OUTPATIENT
Start: 2021-03-24 | End: 2021-03-24 | Stop reason: HOSPADM

## 2021-03-24 RX ORDER — DIPHENHYDRAMINE HYDROCHLORIDE 50 MG/ML
12.5 INJECTION INTRAMUSCULAR; INTRAVENOUS ONCE
Status: COMPLETED | OUTPATIENT
Start: 2021-03-24 | End: 2021-03-24

## 2021-03-24 RX ORDER — PROPOFOL 10 MG/ML
INJECTION, EMULSION INTRAVENOUS PRN
Status: DISCONTINUED | OUTPATIENT
Start: 2021-03-24 | End: 2021-03-24

## 2021-03-24 RX ORDER — OXYCODONE HCL 5 MG/5 ML
5 SOLUTION, ORAL ORAL EVERY 4 HOURS PRN
Status: DISCONTINUED | OUTPATIENT
Start: 2021-03-24 | End: 2021-03-24

## 2021-03-24 RX ORDER — LIDOCAINE HYDROCHLORIDE 20 MG/ML
INJECTION, SOLUTION INFILTRATION; PERINEURAL PRN
Status: DISCONTINUED | OUTPATIENT
Start: 2021-03-24 | End: 2021-03-24

## 2021-03-24 RX ORDER — DEXAMETHASONE SODIUM PHOSPHATE 4 MG/ML
4 INJECTION, SOLUTION INTRA-ARTICULAR; INTRALESIONAL; INTRAMUSCULAR; INTRAVENOUS; SOFT TISSUE
Status: DISCONTINUED | OUTPATIENT
Start: 2021-03-24 | End: 2021-03-24 | Stop reason: HOSPADM

## 2021-03-24 RX ORDER — METOCLOPRAMIDE HYDROCHLORIDE 5 MG/ML
10 INJECTION INTRAMUSCULAR; INTRAVENOUS EVERY 6 HOURS PRN
Status: DISCONTINUED | OUTPATIENT
Start: 2021-03-24 | End: 2021-03-24

## 2021-03-24 RX ORDER — ALBUMIN, HUMAN INJ 5% 5 %
SOLUTION INTRAVENOUS CONTINUOUS PRN
Status: DISCONTINUED | OUTPATIENT
Start: 2021-03-24 | End: 2021-03-24

## 2021-03-24 RX ORDER — FENTANYL CITRATE 50 UG/ML
INJECTION, SOLUTION INTRAMUSCULAR; INTRAVENOUS PRN
Status: DISCONTINUED | OUTPATIENT
Start: 2021-03-24 | End: 2021-03-24

## 2021-03-24 RX ORDER — SODIUM CHLORIDE, SODIUM LACTATE, POTASSIUM CHLORIDE, CALCIUM CHLORIDE 600; 310; 30; 20 MG/100ML; MG/100ML; MG/100ML; MG/100ML
INJECTION, SOLUTION INTRAVENOUS CONTINUOUS
Status: DISCONTINUED | OUTPATIENT
Start: 2021-03-24 | End: 2021-03-24 | Stop reason: HOSPADM

## 2021-03-24 RX ORDER — MEPERIDINE HYDROCHLORIDE 25 MG/ML
12.5 INJECTION INTRAMUSCULAR; INTRAVENOUS; SUBCUTANEOUS EVERY 5 MIN PRN
Status: DISCONTINUED | OUTPATIENT
Start: 2021-03-24 | End: 2021-03-24 | Stop reason: HOSPADM

## 2021-03-24 RX ORDER — METOPROLOL TARTRATE 1 MG/ML
1-2 INJECTION, SOLUTION INTRAVENOUS EVERY 5 MIN PRN
Status: DISCONTINUED | OUTPATIENT
Start: 2021-03-24 | End: 2021-03-24 | Stop reason: HOSPADM

## 2021-03-24 RX ORDER — ONDANSETRON 4 MG/1
4 TABLET, ORALLY DISINTEGRATING ORAL EVERY 30 MIN PRN
Status: DISCONTINUED | OUTPATIENT
Start: 2021-03-24 | End: 2021-03-24 | Stop reason: HOSPADM

## 2021-03-24 RX ORDER — LORAZEPAM 2 MG/ML
.5-1 INJECTION INTRAMUSCULAR
Status: DISCONTINUED | OUTPATIENT
Start: 2021-03-24 | End: 2021-03-24 | Stop reason: HOSPADM

## 2021-03-24 RX ORDER — ONDANSETRON 2 MG/ML
4 INJECTION INTRAMUSCULAR; INTRAVENOUS EVERY 30 MIN PRN
Status: DISCONTINUED | OUTPATIENT
Start: 2021-03-24 | End: 2021-03-24 | Stop reason: HOSPADM

## 2021-03-24 RX ORDER — HYDROMORPHONE HYDROCHLORIDE 1 MG/ML
.3-.5 INJECTION, SOLUTION INTRAMUSCULAR; INTRAVENOUS; SUBCUTANEOUS EVERY 5 MIN PRN
Status: DISCONTINUED | OUTPATIENT
Start: 2021-03-24 | End: 2021-03-24 | Stop reason: HOSPADM

## 2021-03-24 RX ORDER — ALBUTEROL SULFATE 0.83 MG/ML
2.5 SOLUTION RESPIRATORY (INHALATION) EVERY 4 HOURS PRN
Status: DISCONTINUED | OUTPATIENT
Start: 2021-03-24 | End: 2021-03-24 | Stop reason: HOSPADM

## 2021-03-24 RX ORDER — HYDRALAZINE HYDROCHLORIDE 20 MG/ML
2.5-5 INJECTION INTRAMUSCULAR; INTRAVENOUS EVERY 10 MIN PRN
Status: DISCONTINUED | OUTPATIENT
Start: 2021-03-24 | End: 2021-03-24 | Stop reason: HOSPADM

## 2021-03-24 RX ORDER — ONDANSETRON 2 MG/ML
INJECTION INTRAMUSCULAR; INTRAVENOUS PRN
Status: DISCONTINUED | OUTPATIENT
Start: 2021-03-24 | End: 2021-03-24

## 2021-03-24 RX ORDER — DEXAMETHASONE SODIUM PHOSPHATE 4 MG/ML
INJECTION, SOLUTION INTRA-ARTICULAR; INTRALESIONAL; INTRAMUSCULAR; INTRAVENOUS; SOFT TISSUE PRN
Status: DISCONTINUED | OUTPATIENT
Start: 2021-03-24 | End: 2021-03-24

## 2021-03-24 RX ORDER — HYDROMORPHONE HCL IN WATER/PF 6 MG/30 ML
.2-.3 PATIENT CONTROLLED ANALGESIA SYRINGE INTRAVENOUS
Status: DISCONTINUED | OUTPATIENT
Start: 2021-03-24 | End: 2021-03-25 | Stop reason: HOSPADM

## 2021-03-24 RX ORDER — FENTANYL CITRATE 50 UG/ML
25-50 INJECTION, SOLUTION INTRAMUSCULAR; INTRAVENOUS
Status: DISCONTINUED | OUTPATIENT
Start: 2021-03-24 | End: 2021-03-24 | Stop reason: HOSPADM

## 2021-03-24 RX ORDER — PROPOFOL 10 MG/ML
INJECTION, EMULSION INTRAVENOUS CONTINUOUS PRN
Status: DISCONTINUED | OUTPATIENT
Start: 2021-03-24 | End: 2021-03-24

## 2021-03-24 RX ORDER — METOCLOPRAMIDE 10 MG/1
10 TABLET ORAL EVERY 6 HOURS PRN
Status: DISCONTINUED | OUTPATIENT
Start: 2021-03-24 | End: 2021-03-24

## 2021-03-24 RX ADMIN — Medication 1 LOZENGE: at 17:48

## 2021-03-24 RX ADMIN — ALBUMIN HUMAN: 0.05 INJECTION, SOLUTION INTRAVENOUS at 11:41

## 2021-03-24 RX ADMIN — SUCCINYLCHOLINE CHLORIDE 100 MG: 20 INJECTION, SOLUTION INTRAMUSCULAR; INTRAVENOUS; PARENTERAL at 10:34

## 2021-03-24 RX ADMIN — ROCURONIUM BROMIDE 10 MG: 10 INJECTION INTRAVENOUS at 11:58

## 2021-03-24 RX ADMIN — SODIUM CHLORIDE, POTASSIUM CHLORIDE, SODIUM LACTATE AND CALCIUM CHLORIDE: 600; 310; 30; 20 INJECTION, SOLUTION INTRAVENOUS at 03:01

## 2021-03-24 RX ADMIN — SODIUM CHLORIDE, POTASSIUM CHLORIDE, SODIUM LACTATE AND CALCIUM CHLORIDE: 600; 310; 30; 20 INJECTION, SOLUTION INTRAVENOUS at 10:25

## 2021-03-24 RX ADMIN — HYDROMORPHONE HYDROCHLORIDE 0.5 MG: 1 INJECTION, SOLUTION INTRAMUSCULAR; INTRAVENOUS; SUBCUTANEOUS at 13:49

## 2021-03-24 RX ADMIN — LIDOCAINE HYDROCHLORIDE 60 MG: 20 INJECTION, SOLUTION INFILTRATION; PERINEURAL at 10:34

## 2021-03-24 RX ADMIN — HYDROMORPHONE HYDROCHLORIDE 0.5 MG: 1 INJECTION, SOLUTION INTRAMUSCULAR; INTRAVENOUS; SUBCUTANEOUS at 14:21

## 2021-03-24 RX ADMIN — DEXAMETHASONE SODIUM PHOSPHATE 4 MG: 4 INJECTION, SOLUTION INTRA-ARTICULAR; INTRALESIONAL; INTRAMUSCULAR; INTRAVENOUS; SOFT TISSUE at 10:44

## 2021-03-24 RX ADMIN — SODIUM CHLORIDE, POTASSIUM CHLORIDE, SODIUM LACTATE AND CALCIUM CHLORIDE: 600; 310; 30; 20 INJECTION, SOLUTION INTRAVENOUS at 12:06

## 2021-03-24 RX ADMIN — ONDANSETRON 4 MG: 2 INJECTION INTRAMUSCULAR; INTRAVENOUS at 12:41

## 2021-03-24 RX ADMIN — Medication 1 LOZENGE: at 20:33

## 2021-03-24 RX ADMIN — HYDROMORPHONE HYDROCHLORIDE 0.5 MG: 1 INJECTION, SOLUTION INTRAMUSCULAR; INTRAVENOUS; SUBCUTANEOUS at 15:34

## 2021-03-24 RX ADMIN — Medication 1 LOZENGE: at 03:13

## 2021-03-24 RX ADMIN — HYDROMORPHONE HYDROCHLORIDE 0.5 MG: 1 INJECTION, SOLUTION INTRAMUSCULAR; INTRAVENOUS; SUBCUTANEOUS at 14:41

## 2021-03-24 RX ADMIN — SUGAMMADEX 200 MG: 100 INJECTION, SOLUTION INTRAVENOUS at 13:46

## 2021-03-24 RX ADMIN — ROCURONIUM BROMIDE 40 MG: 10 INJECTION INTRAVENOUS at 10:43

## 2021-03-24 RX ADMIN — ONDANSETRON 4 MG: 2 INJECTION INTRAMUSCULAR; INTRAVENOUS at 17:42

## 2021-03-24 RX ADMIN — ONDANSETRON 4 MG: 2 INJECTION INTRAMUSCULAR; INTRAVENOUS at 14:27

## 2021-03-24 RX ADMIN — FENTANYL CITRATE 50 MCG: 50 INJECTION, SOLUTION INTRAMUSCULAR; INTRAVENOUS at 11:19

## 2021-03-24 RX ADMIN — PROPOFOL 50 MCG/KG/MIN: 10 INJECTION, EMULSION INTRAVENOUS at 10:40

## 2021-03-24 RX ADMIN — HYDROMORPHONE HYDROCHLORIDE 0.3 MG: 0.2 INJECTION, SOLUTION INTRAMUSCULAR; INTRAVENOUS; SUBCUTANEOUS at 17:37

## 2021-03-24 RX ADMIN — PROPOFOL 160 MG: 10 INJECTION, EMULSION INTRAVENOUS at 10:34

## 2021-03-24 RX ADMIN — DIPHENHYDRAMINE HYDROCHLORIDE 12.5 MG: 50 INJECTION, SOLUTION INTRAMUSCULAR; INTRAVENOUS at 14:50

## 2021-03-24 RX ADMIN — ONDANSETRON 4 MG: 2 INJECTION INTRAMUSCULAR; INTRAVENOUS at 23:58

## 2021-03-24 RX ADMIN — HYDROMORPHONE HYDROCHLORIDE 0.3 MG: 0.2 INJECTION, SOLUTION INTRAMUSCULAR; INTRAVENOUS; SUBCUTANEOUS at 23:58

## 2021-03-24 RX ADMIN — HYDROMORPHONE HYDROCHLORIDE 0.3 MG: 0.2 INJECTION, SOLUTION INTRAMUSCULAR; INTRAVENOUS; SUBCUTANEOUS at 20:28

## 2021-03-24 RX ADMIN — FENTANYL CITRATE 100 MCG: 50 INJECTION, SOLUTION INTRAMUSCULAR; INTRAVENOUS at 10:34

## 2021-03-24 RX ADMIN — FENTANYL CITRATE 50 MCG: 50 INJECTION, SOLUTION INTRAMUSCULAR; INTRAVENOUS at 11:04

## 2021-03-24 ASSESSMENT — ACTIVITIES OF DAILY LIVING (ADL)
ADLS_ACUITY_SCORE: 14

## 2021-03-24 NOTE — ANESTHESIA POSTPROCEDURE EVALUATION
Patient: Christina Orosco    Procedure(s):  LAPAROSCOPIC CHOLECYSTECTOMY    Diagnosis:Cholecystitis [K81.9]  Diagnosis Additional Information: No value filed.    Anesthesia Type:  General    Note:  Disposition: Admission   Postop Pain Control: Uneventful            Sign Out: Well controlled pain   PONV: No   Neuro/Psych: Uneventful            Sign Out: Acceptable/Baseline neuro status   Airway/Respiratory: Uneventful            Sign Out: Acceptable/Baseline resp. status   CV/Hemodynamics: Uneventful            Sign Out: Acceptable CV status   Other NRE: NONE   DID A NON-ROUTINE EVENT OCCUR? No    Event details/Postop Comments:  Stable and doing well prior to transfer to floor.             Last vitals:  Vitals:    03/24/21 1420 03/24/21 1430 03/24/21 1450   BP: (!) 144/88 (!) 145/86 (!) 148/86   Pulse: 81 69 77   Resp: 20 20 20   Temp:      SpO2: 100% 100%        Last vitals prior to Anesthesia Care Transfer:  CRNA VITALS  3/24/2021 1335 - 3/24/2021 1435      3/24/2021             Resp Rate (set):  10          Electronically Signed By: Hunter Patterson MD  March 24, 2021  3:22 PM

## 2021-03-24 NOTE — PROGRESS NOTES
[facility]    General Surgery  Daily Note       Assessment and Plan:   Christina Orosco is a 30 year old female with cholecystitis and choledocholithiasis, status post ERCP 3/23/2021 with extraction of common bile duct stone    -Will plan to proceed with laparoscopic cholecystectomy today.  Patient in agreement.  Possible discharge to home later today versus tomorrow morning.        Interval History:   Patient remains stable.  Continued abdominal pain.  Denies nausea.  LFTs improved following ERCP.           Physical Exam:   Temp: 98.2  F (36.8  C) Temp src: Oral BP: 134/86 Pulse: 64   Resp: 16 SpO2: 96 % O2 Device: None (Room air) Oxygen Delivery: 2 LPM    I/O last 3 completed shifts:  In: 2641 [P.O.:30; I.V.:2611]  Out: -       Constitutional: healthy, alert and no distress   Respiratory: Breathing nonlabored  Abdomen: Soft, nondistended, focally tender with palpation in the right upper quadrant      Data   Recent Labs   Lab 03/24/21  0751 03/23/21  0743 03/22/21  0940   WBC 6.4 5.0 5.9   HGB 11.1* 11.6* 12.0   MCV 82 82 82    176 189   INR  --  1.14  --     142 140   POTASSIUM 3.5 3.6 3.8   CHLORIDE 108 109 106   CO2 27 27 26   BUN 7 8 8   CR 0.75 0.75 0.77   ANIONGAP 5 6 8   RANDAL 8.8 9.1 11.1*   GLC 79 78 89   ALBUMIN 3.2* 3.4 3.8   PROTTOTAL 6.2* 6.4* 7.0   BILITOTAL 1.7* 5.9* 2.8*   ALKPHOS 264* 296* 252*   * 844* 530*   * 719* 1,129*       Laurie Fernandez MD

## 2021-03-24 NOTE — OR NURSING
Assisted with 2 nurses to preop Bathroom - ambulated from bed outside BR with nurses - did well  voided large amount - rito well

## 2021-03-24 NOTE — OP NOTE
General Surgery Operative Note    PREOPERATIVE DIAGNOSIS: Cholecystitis     POSTOPERATIVE DIAGNOSIS: Severe cholecystitis    PROCEDURE: LAPAROSCOPIC CHOLECYSTECTOMY     ANESTHESIA:  General    SURGEON:  Laurie Fernandez MD    ASSISTANT:  Cody Caballero PA-C assisted in the above procedure. They provided assistance with pre-operative positioning, prepping, and draping of the patient. The assistant provided vital operative assistance with retraction using instruments thus providing the necessary exposure and visualization for the case, manipulation of tissues to achieve hemostasis, suction for visualization and assisted in closure of the wound. Post-operatively they assisted in transfer of the patient off the operative table and transition to the PACU physicians.    INDICATIONS:  Christina Orosco is a 30 year old female who presented with complaints of abdominal pain. The patient was evaluated with a right upper quadrant ultrasound which revealed evidence of cholelithiasis and cholecystitis. The patient's LFTs were elevated.  She ultimately underwent ERCP with GI and choledocholithiasis was demonstrated and cleared. The decision was then made for the patient to proceed to the operating room for laparoscopic removal of the gallbladder. The risks and benefits of the procedure were discussed with the patient, and consent for the procedure was obtained.     PROCEDURE: The patient was brought to the preoperative area and preoperative antibiotics were administered. The patient was brought back to the operating room and placed in the supine position on the operating table. A time out was completed. Anesthesia was induced and the patient was intubated. The patient was secured to the operating table and their pressure points were padded. The patient's abdomen was prepped and draped in the sterile fashion. Following infiltration of local anesthetic, the 11 blade scalpel was used to make a small left upper quadrant incision.   Entrance into the abdomen was gained using the 5mm visiport. The patient's abdomen was then fully insufflated. The scope was then placed within the abdomen. Initial inspection revealed no evidence of injury at the port entry site. Under direct visualization, three additional ports were placed, one 5 mm supraumbilical port and two 5 mm right lateral ports.  The patient's left upper quadrant port was then upsized to an 11 mm port.  The gallbladder was identified and found to have evidence of severe wall thickening.  The laparoscopic needle was utilized to partially aspirate the gallbladder.  The infundibulum of the gallbladder was grasped and retracted laterally. A combination of careful dissection utilizing the [place] dissector and hook electrocautery was then carried out to carefully dissect out the cystic duct and cystic artery.  During our dissection, there was significant bleeding from the cystic artery, which was controlled utilizing surgical clips.  When both structures could be clearly seen to be coursing directly into the gallbladder, the patient's cystic duct was divided utilizing a single firing of a blue load of the Endo DMOINICK stapler. The cystic artery was clipped with multiple surgical clips proximally.  The distal artery was divided utilizing the electrocautery.  The hook electrocautery was then used to carefully dissect the gallbladder free from its attachments to the liver bed. When the gallbladder had been completely dissected free, it was placed in an Endo Catch bag and removed through the left upper quadrant port. The port was reinserted and the surgical site inspected. Hemostasis of the liver bed was obtained using the electrocautery. The patient's right upper quadrant was then irrigated with normal saline solution. The patient's left upper quadrant port was then removed and there was no evidence of bleeding at the port exit site.  The fascia was reapproximated using a figure-of-eight 0 Vicryl  stitch and the Robert-Ricardo fascial closure device. The patient's 5 mm ports were then removed and there was no evidence of bleeding at the port exit sites. The patient's abdomen was then allowed to desufflate fully.  The port sites were then inspected and hemostasis was obtained using the electrocautery. The port sites were reapproximated using 4-0 Monocryl subcuticular stitches. The incisions were washed and dried and sterile dressings were applied. The lap, needle, and instrument counts were correct at the end of the procedure. The patient tolerated the procedure well and was extubated and taken to the recovery area in stable condition.     ESTIMATED BLOOD LOSS:  400 ml     INTRAOPERATIVE FINDINGS: Severe, dense gallbladder wall inflammation down to the level of the ductal structures.  Cystic duct divided with laparoscopic stapler at the level of the infundibulum.  Cystic artery clipped.    SPECIMENS: Gallbladder and contents     DRAINS: None    CONDITION: The patient will be readmitted to the surgical floor with instructions for postoperative cares and medications for pain management.      Laurie Fernandez MD

## 2021-03-24 NOTE — ANESTHESIA PROCEDURE NOTES
Airway       Patient location during procedure: OR       Procedure Start/Stop Times: 3/24/2021 10:37 AM  Staff -        Anesthesiologist:  Hunter Patterson MD       CRNA: Rock Martinez APRN CRNA       Other Anesthesia Staff: Tracy Valiente       Performed By: SRNAIndications and Patient Condition       Indications for airway management: sulema-procedural       Induction type:RSI       Mask difficulty assessment: 0 - not attempted    Final Airway Details       Final airway type: endotracheal airway       Successful airway: ETT - single  Endotracheal Airway Details        ETT size (mm): 7.0       Cuffed: yes       Successful intubation technique: direct laryngoscopy       DL Blade Type: MAC 3       Grade View of Cords: 1       Adjucts: stylet       Position: Right       Measured from: gums/teeth       Secured at (cm): 22       Bite block used: None    Post intubation assessment        Number of attempts at approach: 1       Secured with: pink tape       Ease of procedure: easy       Dentition: Unchanged    Medication(s) Administered   Medication Administration Time: 3/24/2021 10:37 AM

## 2021-03-24 NOTE — ANESTHESIA PREPROCEDURE EVALUATION
Anesthesia Pre-Procedure Evaluation    Patient: Christina Orosco   MRN: [id number] : 1990        Preoperative Diagnosis: Cholecystitis [K81.9]   Procedure : Procedure(s):  LAPAROSCOPIC CHOLECYSTECTOMY WITH CHOLANGIOGRAM     Past Medical History:   Diagnosis Date     Infectious mononucleosis      LSIL (low grade squamous intraepithelial lesion) on Pap smear 2007    colp - WNL     Migraines      NONSPECIFIC MEDICAL HISTORY     rt elbow fracturem snowboarding     NONSPECIFIC MEDICAL HISTORY 9 yrs old    bilat wrist fractures due to injury      Past Surgical History:   Procedure Laterality Date     ENDOSCOPIC RETROGRADE CHOLANGIOPANCREATOGRAM N/A 3/23/2021    Procedure: ENDOSCOPIC RETROGRADE CHOLANGIOPANCREATOGRAPHY, WITH STONE EXTRACTION AND SPHINCTEROTOMY;  Surgeon: Joselin Aponte MD;  Location: [facility] OR     ENDOSCOPIC ULTRASOUND UPPER GASTROINTESTINAL TRACT (GI) N/A 3/23/2021    Procedure: ENDOSCOPIC Ultrasound;  Surgeon: Joselin Aponte MD;  Location:  OR     HC TOOTH EXTRACTION W/FORCEP      wisdom teeth      SURGICAL HISTORY OF -       rt elbow surgery x2 for fracture      Allergies   Allergen Reactions     Amoxicillin Rash     Penicillin [Penicillins] Hives      Social History     Tobacco Use     Smoking status: Former Smoker     Types: Cigarettes     Quit date: 2011     Years since quittin.8     Smokeless tobacco: Never Used   Substance Use Topics     Alcohol use: Yes     Frequency: Monthly or less     Drinks per session: 1 or 2      Wt Readings from Last 1 Encounters:   21 69.5 kg (153 lb 4.8 oz)        Anesthesia Evaluation   Pt has had prior anesthetic.     No history of anesthetic complications       ROS/MED HX  ENT/Pulmonary:    (-) sleep apnea   Neurologic:       Cardiovascular:       METS/Exercise Tolerance:     Hematologic:       Musculoskeletal:       GI/Hepatic:     (+) cholecystitis/cholelithiasis,  (-) GERD   Renal/Genitourinary:       Endo:      (+) Obesity,     Psychiatric/Substance Use:     (+) psychiatric history anxiety and depression     Infectious Disease:       Malignancy:       Other:            Physical Exam    Airway        Mallampati: II   TM distance: > 3 FB   Neck ROM: full   Mouth opening: > 3 cm    Respiratory Devices and Support         Dental  no notable dental history         Cardiovascular   cardiovascular exam normal          Pulmonary   pulmonary exam normal                OUTSIDE LABS:  CBC:   Lab Results   Component Value Date    WBC 6.4 03/24/2021    WBC 5.0 03/23/2021    HGB 11.1 (L) 03/24/2021    HGB 11.6 (L) 03/23/2021    HCT 33.3 (L) 03/24/2021    HCT 34.7 (L) 03/23/2021     03/24/2021     03/23/2021     BMP:   Lab Results   Component Value Date     03/24/2021     03/23/2021    POTASSIUM 3.5 03/24/2021    POTASSIUM 3.6 03/23/2021    CHLORIDE 108 03/24/2021    CHLORIDE 109 03/23/2021    CO2 27 03/24/2021    CO2 27 03/23/2021    BUN 7 03/24/2021    BUN 8 03/23/2021    CR 0.75 03/24/2021    CR 0.75 03/23/2021    GLC 79 03/24/2021    GLC 78 03/23/2021     COAGS:   Lab Results   Component Value Date    INR 1.14 03/23/2021     POC:   Lab Results   Component Value Date    HCG Negative 03/24/2021     HEPATIC:   Lab Results   Component Value Date    ALBUMIN 3.2 (L) 03/24/2021    PROTTOTAL 6.2 (L) 03/24/2021     (HH) 03/24/2021     (H) 03/24/2021    ALKPHOS 264 (H) 03/24/2021    BILITOTAL 1.7 (H) 03/24/2021     OTHER:   Lab Results   Component Value Date    RANDAL 8.8 03/24/2021    LIPASE 148 03/22/2021    TSH 1.41 01/14/2020       Anesthesia Plan    ASA Status:  2   NPO Status:  NPO Appropriate    Anesthesia Type: General.     - Airway: ETT   Induction: Intravenous, RSI.   Maintenance: Balanced.        Consents    Anesthesia Plan(s) and associated risks, benefits, and realistic alternatives discussed. Questions answered and patient/representative(s) expressed understanding.     - Discussed with:   Patient         Postoperative Care    Pain management: IV analgesics, Oral pain medications.   PONV prophylaxis: Background Propofol Infusion, Ondansetron (or other 5HT-3), Dexamethasone or Solumedrol     Comments:    Breastfeeding  - no versed            Brittany Frank MD, MD

## 2021-03-24 NOTE — ANESTHESIA CARE TRANSFER NOTE
Patient: Christina Orosco    Procedure(s):  LAPAROSCOPIC CHOLECYSTECTOMY    Diagnosis: Cholecystitis [K81.9]  Diagnosis Additional Information: No value filed.    Anesthesia Type:   General     Note:    Oropharynx: spontaneously breathing  Level of Consciousness: drowsy  Oxygen Supplementation: face mask  Level of Supplemental Oxygen (L/min / FiO2): 8  Independent Airway: airway patency satisfactory and stable  Dentition: dentition unchanged  Vital Signs Stable: post-procedure vital signs reviewed and stable  Report to RN Given: handoff report given  Patient transferred to: PACU  Comments: Neuromuscular blockade reversed after TOF 4/4, spontaneous respirations, adequate tidal volumes, followed commands to voice, oropharynx suctioned with soft flexible catheter, extubated atraumatically, extubated with suction, airway patent after extubation.  Oxygen via facemask at 8 liters per minute to PACU. Oxygen tubing connected to wall O2 in PACU, SpO2, NiBP, and EKG monitors and alarms on and functioning, report on patient's clinical status given to PACU RN, RN questions answered.     Handoff Report: Identifed the Patient, Identified the Reponsible Provider, Reviewed the pertinent medical history, Discussed the surgical course, Reviewed Intra-OP anesthesia mangement and issues during anesthesia, Set expectations for post-procedure period and Allowed opportunity for questions and acknowledgement of understanding      Vitals: (Last set prior to Anesthesia Care Transfer)  CRNA VITALS  3/24/2021 1335 - 3/24/2021 1410      3/24/2021             Resp Rate (set):  10        Electronically Signed By: BONNY Shrestha CRNA  March 24, 2021  2:10 PM

## 2021-03-24 NOTE — PLAN OF CARE
Summary: Cholecystitis, elevated LFT's  DATE & TIME: 3/23/21 3-11pm  Cognitive Concerns/ Orientation: A&Ox4, calm & cooperative   BEHAVIOR & AGGRESSION TOOL COLOR: Green  CIWA SCORE: N/A  ABNL VS/O2: VSS on RA  MOBILITY: Independent.  PAIN MANAGMENT: denies. C/o sore throat from ERCP done decreased with ice chips.  DIET: Clear liquid  BOWEL/BLADDER: Continent, up to BR   ABNL LAB/BG: Bili 5.9, ,   DRAIN/DEVICES: L arm PIV infusing LR @ 100ml/hr  TELEMETRY RHYTHM: N/A  SKIN: WDL  TESTS/PROCEDURES: ERCP done today, 1 stone removed. lap stacia at 9 am tomorrow. Npo from midnight.  D/C DAY/GOALS/PLACE: discharge home possible tomorrow after lap stacia.  OTHER IMPORTANT INFo: GI and Surgery following.

## 2021-03-24 NOTE — PLAN OF CARE
Summary: Cholecystitis, elevated LFT's  DATE & TIME: 3/23/21 Night  Cognitive Concerns/ Orientation: Alert and oriented  BEHAVIOR & AGGRESSION TOOL COLOR: Green  ABNL VS/O2: VSS on RA  MOBILITY: Independent  PAIN MANAGMENT: denies pain.  Lozenge given for sore throat from ERCP    DIET: NPO  BOWEL/BLADDER: Continent,    ABNL LAB/BG: Bili 5.9, ,   DRAIN/DEVICES:   LR @ 100ml/hr  SKIN: WDL  TESTS/PROCEDURES: Lap stacia at 9:50 am   D/C DAY/GOALS/PLACE: discharge home possibly after lap stacia.  OTHER IMPORTANT INFO:  4 months post partum

## 2021-03-24 NOTE — PLAN OF CARE
Summary: Cholecystitis, elevated LFT's  DATE & TIME: 3/24/21 7476-8440  Cognitive Concerns/ Orientation: Alert and oriented  BEHAVIOR & AGGRESSION TOOL COLOR: Green  ABNL VS/O2: VSS on RA  MOBILITY: Independent  PAIN MANAGMENT: denies pain.  DIET: NPO for lap stacia today  BOWEL/BLADDER: Continent, no BM this shift.    ABNL LAB/BG: Bili 1.7, ,   DRAIN/DEVICES: LR @ 100ml/hr  SKIN: WDL  TESTS/PROCEDURES: Lap stacia completed today  D/C DAY/GOALS/PLACE: discharge home possibly today after lap stacia.  OTHER IMPORTANT INFO: 4 months post partum. GI following. Calls appropriately. Will continue to monitor.

## 2021-03-25 VITALS
BODY MASS INDEX: 26.24 KG/M2 | OXYGEN SATURATION: 96 % | TEMPERATURE: 98.3 F | SYSTOLIC BLOOD PRESSURE: 143 MMHG | DIASTOLIC BLOOD PRESSURE: 83 MMHG | WEIGHT: 156.5 LBS | HEART RATE: 86 BPM | RESPIRATION RATE: 16 BRPM

## 2021-03-25 LAB
ALBUMIN SERPL-MCNC: 3.2 G/DL (ref 3.4–5)
ALP SERPL-CCNC: 204 U/L (ref 40–150)
ALT SERPL W P-5'-P-CCNC: 381 U/L (ref 0–50)
AST SERPL W P-5'-P-CCNC: 82 U/L (ref 0–45)
BILIRUB DIRECT SERPL-MCNC: 0.5 MG/DL (ref 0–0.2)
BILIRUB SERPL-MCNC: 1.1 MG/DL (ref 0.2–1.3)
COPATH REPORT: NORMAL
ERYTHROCYTE [DISTWIDTH] IN BLOOD BY AUTOMATED COUNT: 14.3 % (ref 10–15)
HCT VFR BLD AUTO: 33.1 % (ref 35–47)
HGB BLD-MCNC: 11.2 G/DL (ref 11.7–15.7)
MCH RBC QN AUTO: 28.1 PG (ref 26.5–33)
MCHC RBC AUTO-ENTMCNC: 33.8 G/DL (ref 31.5–36.5)
MCV RBC AUTO: 83 FL (ref 78–100)
PLATELET # BLD AUTO: 175 10E9/L (ref 150–450)
PROT SERPL-MCNC: 5.7 G/DL (ref 6.8–8.8)
RBC # BLD AUTO: 3.99 10E12/L (ref 3.8–5.2)
WBC # BLD AUTO: 7.7 10E9/L (ref 4–11)

## 2021-03-25 PROCEDURE — 85027 COMPLETE CBC AUTOMATED: CPT | Performed by: PHYSICIAN ASSISTANT

## 2021-03-25 PROCEDURE — 250N000011 HC RX IP 250 OP 636: Performed by: PHYSICIAN ASSISTANT

## 2021-03-25 PROCEDURE — 250N000013 HC RX MED GY IP 250 OP 250 PS 637: Performed by: STUDENT IN AN ORGANIZED HEALTH CARE EDUCATION/TRAINING PROGRAM

## 2021-03-25 PROCEDURE — 80076 HEPATIC FUNCTION PANEL: CPT | Performed by: PHYSICIAN ASSISTANT

## 2021-03-25 PROCEDURE — 250N000013 HC RX MED GY IP 250 OP 250 PS 637: Performed by: PHYSICIAN ASSISTANT

## 2021-03-25 PROCEDURE — 99239 HOSP IP/OBS DSCHRG MGMT >30: CPT | Performed by: HOSPITALIST

## 2021-03-25 PROCEDURE — 36415 COLL VENOUS BLD VENIPUNCTURE: CPT | Performed by: PHYSICIAN ASSISTANT

## 2021-03-25 PROCEDURE — 258N000003 HC RX IP 258 OP 636: Performed by: PHYSICIAN ASSISTANT

## 2021-03-25 RX ORDER — AMOXICILLIN 250 MG
1-2 CAPSULE ORAL 2 TIMES DAILY PRN
Qty: 20 TABLET | Refills: 0 | Status: SHIPPED | OUTPATIENT
Start: 2021-03-25 | End: 2022-05-04

## 2021-03-25 RX ORDER — OXYCODONE HYDROCHLORIDE 5 MG/1
5 TABLET ORAL
Status: CANCELLED | OUTPATIENT
Start: 2021-03-25

## 2021-03-25 RX ORDER — ONDANSETRON 4 MG/1
4 TABLET, ORALLY DISINTEGRATING ORAL EVERY 6 HOURS PRN
Qty: 10 TABLET | Refills: 0 | Status: SHIPPED | OUTPATIENT
Start: 2021-03-25 | End: 2022-05-04

## 2021-03-25 RX ORDER — OXYCODONE HYDROCHLORIDE 5 MG/1
5-10 TABLET ORAL EVERY 4 HOURS PRN
Qty: 12 TABLET | Refills: 0 | Status: SHIPPED | OUTPATIENT
Start: 2021-03-25 | End: 2022-05-04

## 2021-03-25 RX ADMIN — SODIUM CHLORIDE, POTASSIUM CHLORIDE, SODIUM LACTATE AND CALCIUM CHLORIDE: 600; 310; 30; 20 INJECTION, SOLUTION INTRAVENOUS at 01:30

## 2021-03-25 RX ADMIN — HYDROMORPHONE HYDROCHLORIDE 0.2 MG: 0.2 INJECTION, SOLUTION INTRAMUSCULAR; INTRAVENOUS; SUBCUTANEOUS at 01:33

## 2021-03-25 RX ADMIN — ONDANSETRON 4 MG: 2 INJECTION INTRAMUSCULAR; INTRAVENOUS at 08:14

## 2021-03-25 RX ADMIN — HYDROMORPHONE HYDROCHLORIDE 0.3 MG: 0.2 INJECTION, SOLUTION INTRAMUSCULAR; INTRAVENOUS; SUBCUTANEOUS at 04:05

## 2021-03-25 RX ADMIN — OXYCODONE HYDROCHLORIDE 5 MG: 5 TABLET ORAL at 08:14

## 2021-03-25 RX ADMIN — Medication 1 LOZENGE: at 00:17

## 2021-03-25 ASSESSMENT — ACTIVITIES OF DAILY LIVING (ADL)
ADLS_ACUITY_SCORE: 14

## 2021-03-25 NOTE — DISCHARGE SUMMARY
[facility]    Discharge Summary  Hospitalist    Date of Admission:  3/22/2021  Date of Discharge:  3/25/2021  Discharging Provider: Adam Small MD  Date of Service (when I saw the patient): 03/25/21    Discharge Diagnoses   Cholelithiasis  Cholecystitis  Choledocholithiasis  Elevated LFTs  Attention deficit disorder  Major depressive disorder, single episode, mild (H)  Anxiety    History of Present Illness   Christina Orosco is an 30 year old female who presented with abdominal pain.    Hospital Course   Christina Orosco was admitted on 3/22/2021.  The following problems were addressed during her hospitalization:        Cholelithiasis  Cholecystitis  Choledocholithiasis  Elevated LFTs  Presents with epigastric abdominal pain along with nausea/vomiting. She is afebrile.  Ultrasound of her abdomen showed cholelithiasis with a thickened gallbladder wall and positive sonographic Magana sign, worrisome for cholecystitis.  Labs showed elevated LFTs. She was otherwise hemodynamically stable, nonseptic appearing.  -Admitted to inpatient.  -Gastroenterology consulted.  -General surgery consulted.  -Pain control as needed.  -Initially NPO and IV fluids.  -S/p EUS and ERCP with sphincterotomy and stone extraction on 3/23/21.  -LFTs subsequently trended down.  -S/p laparoscopic cholecystectomy on 3/24/12.  -She tolerated the procedures well. Tolerating oral intake. Pain well controlled. Feels ready for discharge home.  -Discharge home today.  -Pain medications as prescribed by general surgery. Discussed oxycodone use while breastfeeding, minimizing oxycodone use as able, monitoring baby for sedation, etc.  -Follow-up with general surgery as directed.  -Discussed reasons to seek medical attention prior to follow-up.     Attention deficit disorder  Major depressive disorder, single episode, mild (H)  Anxiety  -Does not appear to be on any medication as an outpatient.  -Stable, follow-up as  outpatient.    Adam Small MD    Significant Results and Procedures   EUS, ERCP, and lap stacia as noted above.    Pending Results   These results will be followed up by Dr. Fernandez.  Unresulted Labs Ordered in the Past 30 Days of this Admission     Date and Time Order Name Status Description    3/24/2021 1338 Surgical pathology exam In process         Code Status   Full Code       Primary Care Physician   Ramona Ann Aaseby-Aguilera    Physical Exam   Temp: 98.3  F (36.8  C) Temp src: Oral BP: (!) 143/83 Pulse: 86   Resp: 16 SpO2: 96 % O2 Device: None (Room air) Oxygen Delivery: 2 LPM  Vitals:    03/24/21 0500 03/25/21 0636   Weight: 69.5 kg (153 lb 4.8 oz) 71 kg (156 lb 8 oz)     Vital Signs with Ranges  Temp:  [98  F (36.7  C)-98.7  F (37.1  C)] 98.3  F (36.8  C)  Pulse:  [62-97] 86  Resp:  [] 16  BP: (136-152)/(80-99) 143/83  SpO2:  [93 %-100 %] 96 %  I/O last 3 completed shifts:  In: 2803 [P.O.:120; I.V.:2433]  Out: 1325 [Urine:925; Blood:400]    Constitutional: awake, alert, cooperative, no apparent distress  Respiratory: clear to auscultation bilaterally, no crackles or wheezing  Cardiovascular: regular rate and rhythm, normal S1 and S2, no murmur noted  GI: normal bowel sounds, soft, non-distended, appropriate tenderness  Skin: warm, dry  Musculoskeletal: no lower extremity pitting edema present  Neurologic: awake, alert, oriented to name, place and time, motor is 5 out of 5 bilaterally, sensory is intact    Discharge Disposition   Discharged to home  Condition at discharge: Stable    Consultations This Hospital Stay   GASTROENTEROLOGY IP CONSULT  GASTROENTEROLOGY IP CONSULT  SURGERY GENERAL IP CONSULT    Time Spent on this Encounter   IAdam MD, personally saw the patient today and spent greater than 30 minutes discharging this patient.    Discharge Orders      No lifting    Avoid strenuous physical activity and lifting over 15 lbs for 3 weeks     Weight bearing status - As tolerated      Diet Instructions    May resume pre-procedure diet     Shower    You may shower two days after surgery. Avoid submersion of the incision (bath, hot tub, pool) for two weeks after surgery.     Dressing    Keep dressing clean and dry.  The surgical dressing may be removed two days after surgery. Gauze/tape or Band-aid may be applied for your comfort.     Ice to affected area    Ice may be applied to operative site as needed for comfort; alternating 10 minutes on/off.     Discharge Instructions    Follow up appointment as instructed by Surgeon and or RN     Reason for your hospital stay    You were in the hospital due to cholecystitis and a gallstone blocking your common bile duct. You had an ERCP to remove the gallstone from the common bile duct. You had a laparoscopic cholecystectomy to remove your gallbladder.     Follow-up and recommended labs and tests     Follow up with your surgeon as directed.     Activity    Your activity upon discharge: as directed by your surgeon     Full Code     Diet    Follow this diet upon discharge: as directed by your surgeon     Discharge Medications   Current Discharge Medication List      START taking these medications    Details   senna-docusate (SENOKOT-S/PERICOLACE) 8.6-50 MG tablet Take 1-2 tablets by mouth 2 times daily as needed for constipation (While on oral opioids.)  Qty: 20 tablet, Refills: 0    Associated Diagnoses: Acute post-operative pain         CONTINUE these medications which have CHANGED    Details   ondansetron (ZOFRAN-ODT) 4 MG ODT tab Take 1 tablet (4 mg) by mouth every 6 hours as needed for nausea or vomiting  Qty: 10 tablet, Refills: 0    Associated Diagnoses: Nausea      oxyCODONE (ROXICODONE) 5 MG tablet Take 1-2 tablets (5-10 mg) by mouth every 4 hours as needed for moderate to severe pain  Qty: 12 tablet, Refills: 0    Associated Diagnoses: Acute post-operative pain         CONTINUE these medications which have NOT CHANGED    Details   NORLYDA 0.35 MG  tablet Take 0.35 mg by mouth daily       Prenatal Vit-Fe Fumarate-FA (PRENATAL MULTIVITAMIN W/IRON) 27-0.8 MG tablet Take 1 tablet by mouth daily  Qty: 90 tablet, Refills: 3    Associated Diagnoses: Encounter for supervision of normal first pregnancy in first trimester      vitamin D3 (CHOLECALCIFEROL) 50 mcg (2000 units) tablet Take 3 tablets by mouth daily           Allergies   Allergies   Allergen Reactions     Amoxicillin Rash     Penicillin [Penicillins] Hives     Data   Most Recent 3 CBC's:  Recent Labs   Lab Test 03/25/21  0728 03/24/21  1143 03/24/21  0751   WBC 7.7 5.4 6.4   HGB 11.2* 11.3* 11.1*   MCV 83 82 82    189 186      Most Recent 3 BMP's:  Recent Labs   Lab Test 03/24/21  0751 03/23/21  0743 03/22/21  0940    142 140   POTASSIUM 3.5 3.6 3.8   CHLORIDE 108 109 106   CO2 27 27 26   BUN 7 8 8   CR 0.75 0.75 0.77   ANIONGAP 5 6 8   RANDAL 8.8 9.1 11.1*   GLC 79 78 89     Most Recent 2 LFT's:  Recent Labs   Lab Test 03/25/21  0728 03/24/21  0751   AST 82* 270*   * 598*   ALKPHOS 204* 264*   BILITOTAL 1.1 1.7*     Most Recent INR's and Anticoagulation Dosing History:  Anticoagulation Dose History     Recent Dosing and Labs Latest Ref Rng & Units 3/23/2021    INR 0.86 - 1.14 1.14        Results for orders placed or performed during the hospital encounter of 03/22/21   XR ERCP    Narrative    XR ERCP 3/23/2021 3:40 PM    HISTORY: ERCP with stone extraction, C-Arm #2, [id number], Fluoro time  1 min 10 secs, Images: 4.    COMPARISON: None.      Impression    IMPRESSION: Small stone in the distal common bile duct. See operative  report for details.    MK HOLCOMB MD

## 2021-03-25 NOTE — PROGRESS NOTES
Surgery    Patient seen and examined.   Good progress.  Labs normalizing  Hgb unchanged from pre-op  Advance diet.  Home today.  Agree with student note below.    PAULA LongC    General Surgery - PA Student    Patient reports she is feeling well today, better than yesterday. She would like to go home today. She reports abdominal pain and discomfort. She reports her pain is a 5 but fluctuates throughout the day. States it is well controlled with medication. She denies BM or flatus but is voiding well. She is able to get out of bed on her own and use the bathroom. She denies fever, chills, nausea or vomiting. She is pumping and dumping her breast milk. Does have concern about the lack of breast milk she is producing and the color of it.     Exam:  Blood pressure (!) 152/93, pulse 97, temperature 98.3  F (36.8  C), temperature source Oral, resp. rate 16, weight 71 kg (156 lb 8 oz), SpO2 98 %, currently breastfeeding.  General: Awake, alert, pleasant and conversational  Resp - clear to ascultation bilaterally.   Cardiac - Regular rate & rhythm without murmur  Abdomen - 4 small incisions, no active drainage, healing well. Abdomen soft, non distended. Appropriate tenderness upon palpation.    Extremities - No lower extremity edema or tenderness to palpation.     Labs:  Hgb 11.2, stable  Bilirubin 1.1 (was 5.9 pre-op)  Alk phos 204 (264)   (598)  AST 82 (270)    A/P: 29 y/o female who was admitted on 3/22 for cholelithiasis, cholecystitis and elevated LFTs. She originally presented with abdominal pain and nausea/vomiting. She is s/p ERCP on 3/23 with removal of stone from distal CBD. She is now s/p laparoscopic cholecystectomy on 3/24.     - Pain management: continue with dilaudid prn as IP. Will plan to send patient home with narcotic rx for a few days.   - Encouraged ambulation   - Diet: ok for patient to start eating small meals, low fat diet.     Mauro AKERS- student

## 2021-03-25 NOTE — PLAN OF CARE
DATE & TIME: 3/24/2021; [id number]  Cognitive Concerns/ Orientation : A & O x 4; anxious after surgery, calm and cooperative toward end of shift  BEHAVIOR & AGGRESSION TOOL COLOR: Green   CIWA SCORE: N/A   ABNL VS/O2: VSS on RA, refused Capno, continuous pulse ox   MOBILITY: SBA, steady on feet, up to BR   PAIN MANAGMENT: 5-10/10 abdominal incision pain, managed well the PRN dilaudid and cold packs, PRN zofran given for nausea management  DIET: Clear liquids, tolerating well   BOWEL/BLADDER: BS hypoactive x 4; no BM, not passing flatus, voiding frequently   ABNL LAB/BG: Albumin=3.2; alk phos=264; ALT=598; PXU=128; hgb=11.3   DRAIN/DEVICES: PIV infusing LR @ 100 mL/hr   TELEMETRY RHYTHM: N/A  SKIN: pale, intact, x 4 lap stacia sites CDI with liquid bandages (dried drainage)  TESTS/PROCEDURES: ERCP completed yesterday, Lap stacia completed today   D/C DAY/GOALS/PLACE: pending discharge  OTHER IMPORTANT INFO: abdomin non-distended but tender; pumping and dumping, pt. Concerned about breast milk output; cough drops given frequently throughout shift for throat discomfort. Nursing will continue to monitor and assess

## 2021-03-25 NOTE — PLAN OF CARE
Discharge    Patient discharged to home via car with mother  Care plan note  Patient is a full code, alert and oriented x4, and vital signs are stable on room air. Pt denies chest pain and SOB. Pt reports 5/10 abdominal/incisional pain, managed with PRN oxy (given x1), helpful. Lung sounds are clear, equal bilaterally. Pt is independent, and denies dizziness/lightheadedness. Pt on a low fiber diet, tolerating it well. BS active x4. Pt is continent of B/B, and is passing flatus. Patient's skin is WDL, incision sites are WDL, liquid bandages CDI. The PIV was removed prior to discharge. The AVS was reviewed with the patient, and the patient stated an understanding. Belongings sent home with patient.     Listed belongings gathered and returned to patient. Yes  Care Plan and Patient education resolved: Yes  Prescriptions if needed, hard copies sent with patient  NA  Home and hospital acquired medications returned to patient: Yes  Medication Bin checked and emptied on discharge Yes  Follow up appointment made for patient: No

## 2021-03-25 NOTE — PLAN OF CARE
DATE & TIME: 3/24/21-3/25/21 9842-7598  Cognitive Concerns/ Orientation : A & O x 4; calm and cooperative  BEHAVIOR & AGGRESSION TOOL COLOR: Green   CIWA SCORE: N/A   ABNL VS/O2: VSS on RA, refused Capno, continuous pulse ox   MOBILITY: IND, steady on feet, up to BR   PAIN MANAGMENT: 5-9/10 abdominal incision pain, managed well the PRN dilaudid and cold packs, PRN zofran given x1 for nausea management, effective  DIET: Clear liquids, tolerating well   BOWEL/BLADDER: BS hypoactive x 4; no BM, not passing flatus, voiding frequently   ABNL LAB/BG: NA  DRAIN/DEVICES: PIV infusing LR @ 100 mL/hr   TELEMETRY RHYTHM: N/A  SKIN: pale, intact, x 4 lap stacia sites CDI with liquid bandages (dried drainage)  TESTS/PROCEDURES: Lap stacia completed yesterday 3/24/21  D/C DAY/GOALS/PLACE: Likely discharge home today  OTHER IMPORTANT INFO: abdomen is non-distended but tender at incision sites; 4 months postpartum, pumping and dumping. Pt concerned about breast milk output

## 2021-03-26 ENCOUNTER — TELEPHONE (OUTPATIENT)
Dept: FAMILY MEDICINE | Facility: CLINIC | Age: 31
End: 2021-03-26

## 2021-03-26 NOTE — TELEPHONE ENCOUNTER
"  Hospital/TCU/ED for chronic condition Discharge Protocol    \"Hi, my name is Jonna Lora, RN, a registered nurse, and I am calling from [facility].  I am calling to follow up and see how things are going for you after your recent emergency visit/hospital/TCU stay.\"    Tell me how you are doing now that you are home?\"   Much better.     Discharge Instructions    \"Let's review your discharge instructions.  What is/are the follow-up recommendations?  Pt. Response:   Reason for your hospital stay  You were in the hospital due to cholecystitis and a gallstone blocking your common bile duct. You had an ERCP  to remove the gallstone from the common bile duct. You had a laparoscopic cholecystectomy to remove your  gallbladder.  START taking:  senna-docusate (SENOKOT-S/PERICOLACE)  CHANGE how you take:  ondansetron (ZOFRAN-ODT)  oxyCODONE (ROXICODONE)  Review your updated medication list below.   these medications from [facility] [place] [address]  ondansetron  oxyCODONE  senna-docusate  Your activity upon discharge: as directed by your surgeon  You may shower two days after surgery. Avoid submersion of the incision (bath, hot tub, pool) for two weeks after  surgery.  Follow this diet upon discharge: as directed by your surgeon  Diet Instructions  May resume pre-procedure diet  Discharge Instructions  Follow up appointment as instructed by Surgeon and or RN  Dressing  Your medications have changed  Your Next Steps  Do  Activity instructions  Diet instructions  After Care Instructions  Natali Orosco (MRN: [id number])   Printed at 3/25/21 12:52 PM Page 2 of 8  After Care Instructions (continued)  Keep dressing clean and dry. The surgical dressing may be removed two days after surgery. Gauze/tape or  Band-aid may be applied for your comfort.  Ice to affected area  Ice may be applied to operative site as needed for comfort; alternating 10 minutes on/off.  No " "lifting  Avoid strenuous physical activity and lifting over 15 lbs for 3 weeks  Weight bearing status - As tolerated  Follow-up and recommended labs and tests  Follow up with your surgeon as directed.  \"Has an appointment with your primary care provider been scheduled?\"   NO patient does not want to schedule at this time. Will follow up with surgeon.       Medications    \"Tell me what changed about your medicines when you discharged?\"    Changes to chronic meds?    0-1    \"What questions do you have about your medications?\"    None     New diagnoses of heart failure, COPD, diabetes, or MI?    No              Post Discharge Medication Reconciliation Status: discharge medications reconciled, continue medications without change.    Was MTM referral placed (*Make sure to put transitions as reason for referral)?   No    Call Summary    \"What questions or concerns do you have about your recent visit and your follow-up care?\"     none    \"If you have questions or things don't continue to improve, we encourage you contact us through the main clinic number (give number).  Even if the clinic is not open, triage nurses are available 24/7 to help you.     We would like you to know that our clinic has extended hours (provide information).  We also have urgent care (provide details on closest location and hours/contact info)\"      \"Thank you for your time and take care!\"       Jonna Lora RN Flex        "

## 2021-04-07 ENCOUNTER — TELEPHONE (OUTPATIENT)
Dept: SURGERY | Facility: CLINIC | Age: 31
End: 2021-04-07

## 2021-04-08 NOTE — TELEPHONE ENCOUNTER
[facility]  Post op call note - Laparoscopic Cholecystectomy    Christina Orosco was called for an update regarding her recovery.  She underwent a laparoscopic cholecystectomy by Dr. Fernandez on 3/24/21.  Today she tells me she is doing well and denies any complaints.  She currently does not need any pain medications.  She is eating a normal diet and her bowels are regular.   The patient states she is slowly resuming normal activity.  She states her wounds are healing well, skin glue is still over the incisions.  She denies any erythema or drainage at her wounds.  The patient denies abdominal pain, changes in urination or BM, or wound concerns.     She was instructed to continue to monitor the incision sites and keep them clean. She was advised to advance her activity as tolerated.  The patient states all of her questions were answered and she understands our discussion.  She agrees to follow up as needed and to call our office with any concerns.    IGOR Smith    Please route or send letter to:  Primary Care Provider (PCP)        Cody Caballero PA-C  Office: [phone]  Pager: [phone]

## 2021-05-02 ENCOUNTER — MEDICAL CORRESPONDENCE (OUTPATIENT)
Dept: HEALTH INFORMATION MANAGEMENT | Facility: CLINIC | Age: 31
End: 2021-05-02

## 2021-10-24 ENCOUNTER — HEALTH MAINTENANCE LETTER (OUTPATIENT)
Age: 31
End: 2021-10-24

## 2022-02-13 ENCOUNTER — HEALTH MAINTENANCE LETTER (OUTPATIENT)
Age: 32
End: 2022-02-13

## 2022-02-21 LAB
HEPATITIS B SURFACE ANTIGEN (EXTERNAL): NEGATIVE
HIV1+2 AB SERPL QL IA: NEGATIVE
RUBELLA ANTIBODY IGG (EXTERNAL): NORMAL

## 2022-05-03 ENCOUNTER — NURSE TRIAGE (OUTPATIENT)
Dept: PEDIATRICS | Facility: CLINIC | Age: 32
End: 2022-05-03

## 2022-05-03 NOTE — TELEPHONE ENCOUNTER
"Offered appointment, patient accepted.  Next 5 appointments (look out 90 days)    May 04, 2022 11:30 AM  (Arrive by 11:10 AM)  Provider Visit with BONNY Newell CNP  [facility] [place] ([facility] [place] ) [address]  [address]  [place] [address] [phone]          Reason for Disposition    Patient wants to be seen    Continuous (nonstop) coughing interferes with work or school and no improvement using cough treatment per Care Advice    Answer Assessment - Initial Assessment Questions  1. ONSET: \"When did the cough begin?\"       10 days ago  2. SEVERITY: \"How bad is the cough today?\"       Interferring with work, frequent wet cough  3. RESPIRATORY DISTRESS: \"Describe your breathing.\"       No SOB or wheezing   4. FEVER: \"Do you have a fever?\" If so, ask: \"What is your temperature, how was it measured, and when did it start?\"      No   5. HEMOPTYSIS: \"Are you coughing up any blood?\" If so ask: \"How much?\" (flecks, streaks, tablespoons, etc.)      No  6. TREATMENT: \"What have you done so far to treat the cough?\" (e.g., meds, fluids, humidifier)      Nasal spray   7. CARDIAC HISTORY: \"Do you have any history of heart disease?\" (e.g., heart attack, congestive heart failure)       No   8. LUNG HISTORY: \"Do you have any history of lung disease?\"  (e.g., pulmonary embolus, asthma, emphysema)      No  9. PE RISK FACTORS: \"Do you have a history of blood clots?\" (or: recent major surgery, recent prolonged travel, bedridden)      No  10. OTHER SYMPTOMS: \"Do you have any other symptoms? (e.g., runny nose, wheezing, chest pain)        Congestion   11. PREGNANCY: \"Is there any chance you are pregnant?\" \"When was your last menstrual period?\"        Yes - 21weeks pregnant   12. TRAVEL: \"Have you traveled out of the country in the last month?\" (e.g., travel history, exposures)        No    Protocols used: COUGH-A-OH      "

## 2022-05-04 ENCOUNTER — OFFICE VISIT (OUTPATIENT)
Dept: PEDIATRICS | Facility: CLINIC | Age: 32
End: 2022-05-04
Payer: COMMERCIAL

## 2022-05-04 VITALS
OXYGEN SATURATION: 100 % | RESPIRATION RATE: 16 BRPM | TEMPERATURE: 98.7 F | HEART RATE: 92 BPM | DIASTOLIC BLOOD PRESSURE: 70 MMHG | SYSTOLIC BLOOD PRESSURE: 122 MMHG

## 2022-05-04 DIAGNOSIS — J06.9 VIRAL UPPER RESPIRATORY TRACT INFECTION: Primary | ICD-10-CM

## 2022-05-04 DIAGNOSIS — R09.81 NASAL CONGESTION: ICD-10-CM

## 2022-05-04 PROCEDURE — 99213 OFFICE O/P EST LOW 20 MIN: CPT | Performed by: NURSE PRACTITIONER

## 2022-05-04 RX ORDER — FLUTICASONE PROPIONATE 50 MCG
1 SPRAY, SUSPENSION (ML) NASAL DAILY
Qty: 9.9 ML | Refills: 0 | Status: SHIPPED | OUTPATIENT
Start: 2022-05-04 | End: 2022-07-05

## 2022-05-04 ASSESSMENT — ENCOUNTER SYMPTOMS: COUGH: 1

## 2022-05-04 ASSESSMENT — PAIN SCALES - GENERAL: PAINLEVEL: NO PAIN (0)

## 2022-05-04 NOTE — PROGRESS NOTES
Assessment & Plan     Viral upper respiratory tract infection  Nasal congestion  Cough and energy level improving, which is reassuring. Lungs CTA. Cough worse when laying down which increases suspicion for post nasal drainage. Recommend starting inhaled steroid nasal spray for this as well as the chronic congestion she has been struggling with. Discussed honey as natural cough suppressant. Has had 3 negative home COVID tests. Return to clinic if symptoms do not continue to improve over the next week.   - fluticasone (FLONASE) 50 MCG/ACT nasal spray; Spray 1 spray into both nostrils daily      20 minutes spent on the date of the encounter doing chart review, patient visit and documentation        MEDICATIONS:        - Trial of flonase       - Continue other medications without change  FUTURE APPOINTMENTS:       - Follow-up for annual visit or as needed    Return in about 1 week (around 5/11/2022), or if symptoms worsen or fail to improve.    BONNY Newell [facility] CONSTANZA Hurst is a 31 year old pregnant female who presents for the following health issues:    Reports a 2 week history of cough that seems to be improving. Cough described as a dry, worse at night when she lays down. Denies shortness of breath, wheezing, and chest tightness. Has been struggling with congestion throughout her entire pregnancy. Denies history of allergies. She did find relief from oxymetazoline nasal spray but she stopped using at the advice of her OB.     She has had 3 negative covid tests at home since symptoms started.       Patient Active Problem List   Diagnosis     Temporomandibular joint disorder     Attention deficit disorder     CARDIOVASCULAR SCREENING; LDL GOAL LESS THAN 160     Contraception     Dysmenorrhea     Major depressive disorder, single episode, mild (H)     Anxiety     Migraine with aura and without status migrainosus, not intractable     Obesity     Indication for care  in labor or delivery     Choledocholithiasis     Past Medical History:   Diagnosis Date     Infectious mononucleosis 5/06     LSIL (low grade squamous intraepithelial lesion) on Pap smear 6/2007    colp - WNL     Migraines      NONSPECIFIC MEDICAL HISTORY     rt elbow fracturem snowboarding     NONSPECIFIC MEDICAL HISTORY 9 yrs old    bilat wrist fractures due to injury     Current Outpatient Medications   Medication     fluticasone (FLONASE) 50 MCG/ACT nasal spray     Prenatal Vit-Fe Fumarate-FA (PRENATAL MULTIVITAMIN W/IRON) 27-0.8 MG tablet     No current facility-administered medications for this visit.        Allergies   Allergen Reactions     Amoxicillin Rash     Penicillin [Penicillins] Hives         Review of Systems   Respiratory: Positive for cough.     ROS: 10 point ROS neg other than the symptoms noted above in the HPI.        Objective    /70   Pulse 92   Temp 98.7  F (37.1  C) (Tympanic)   Resp 16   SpO2 100%   There is no height or weight on file to calculate BMI.  Physical Exam  Constitutional:       General: She is not in acute distress.     Appearance: Normal appearance. She is not ill-appearing or toxic-appearing.   HENT:      Right Ear: Tympanic membrane and ear canal normal.      Left Ear: Tympanic membrane and ear canal normal.      Nose: No congestion or rhinorrhea.      Mouth/Throat:      Mouth: Mucous membranes are moist.      Pharynx: No oropharyngeal exudate or posterior oropharyngeal erythema.   Eyes:      General:         Right eye: No discharge.         Left eye: No discharge.   Cardiovascular:      Rate and Rhythm: Normal rate and regular rhythm.      Heart sounds: Normal heart sounds. No murmur heard.    No friction rub. No gallop.   Pulmonary:      Effort: Pulmonary effort is normal. No respiratory distress.      Breath sounds: Normal breath sounds. No wheezing, rhonchi or rales.   Lymphadenopathy:      Cervical: No cervical adenopathy.   Skin:     General: Skin is warm  and dry.   Neurological:      General: No focal deficit present.      Mental Status: She is alert and oriented to person, place, and time.   Psychiatric:         Mood and Affect: Mood normal.         Behavior: Behavior normal.

## 2022-06-30 DIAGNOSIS — R09.81 NASAL CONGESTION: ICD-10-CM

## 2022-07-05 RX ORDER — FLUTICASONE PROPIONATE 50 MCG
SPRAY, SUSPENSION (ML) NASAL
Qty: 16 G | Refills: 0 | Status: ON HOLD | OUTPATIENT
Start: 2022-07-05 | End: 2022-08-25

## 2022-07-05 NOTE — TELEPHONE ENCOUNTER
Prescription approved per [facility] Refill Protocol.    Angela Mercedes RN

## 2022-08-11 LAB — GROUP B STREPTOCOCCUS (EXTERNAL): NEGATIVE

## 2022-08-13 ENCOUNTER — HOSPITAL ENCOUNTER (OUTPATIENT)
Facility: CLINIC | Age: 32
Discharge: HOME | End: 2022-08-13
Attending: OBSTETRICS & GYNECOLOGY | Admitting: OBSTETRICS & GYNECOLOGY
Payer: COMMERCIAL

## 2022-08-13 VITALS
SYSTOLIC BLOOD PRESSURE: 114 MMHG | WEIGHT: 184 LBS | RESPIRATION RATE: 14 BRPM | TEMPERATURE: 98 F | HEART RATE: 93 BPM | HEIGHT: 62 IN | DIASTOLIC BLOOD PRESSURE: 73 MMHG | BODY MASS INDEX: 33.86 KG/M2

## 2022-08-13 LAB
ALBUMIN MFR UR ELPH: 30.4 MG/DL
ALBUMIN SERPL BCG-MCNC: 3.6 G/DL (ref 3.5–5.2)
ALP SERPL-CCNC: 97 U/L (ref 35–104)
ALT SERPL W P-5'-P-CCNC: 11 U/L (ref 10–35)
ANION GAP SERPL CALCULATED.3IONS-SCNC: 12 MMOL/L (ref 7–15)
AST SERPL W P-5'-P-CCNC: 20 U/L (ref 10–35)
BILIRUB SERPL-MCNC: 0.4 MG/DL
BUN SERPL-MCNC: 4.8 MG/DL (ref 6–20)
CALCIUM SERPL-MCNC: 9 MG/DL (ref 8.6–10)
CHLORIDE SERPL-SCNC: 105 MMOL/L (ref 98–107)
CREAT SERPL-MCNC: 0.57 MG/DL (ref 0.51–0.95)
CREAT UR-MCNC: 316.5 MG/DL
DEPRECATED HCO3 PLAS-SCNC: 20 MMOL/L (ref 22–29)
ERYTHROCYTE [DISTWIDTH] IN BLOOD BY AUTOMATED COUNT: 13.7 % (ref 10–15)
GFR SERPL CREATININE-BSD FRML MDRD: >90 ML/MIN/1.73M2
GLUCOSE SERPL-MCNC: 94 MG/DL (ref 70–99)
HCT VFR BLD AUTO: 30.9 % (ref 35–47)
HGB BLD-MCNC: 9.8 G/DL (ref 11.7–15.7)
MCH RBC QN AUTO: 27.8 PG (ref 26.5–33)
MCHC RBC AUTO-ENTMCNC: 31.7 G/DL (ref 31.5–36.5)
MCV RBC AUTO: 88 FL (ref 78–100)
PLAT MORPH BLD: NORMAL
PLATELET # BLD AUTO: 126 10E3/UL (ref 150–450)
POTASSIUM SERPL-SCNC: 3.5 MMOL/L (ref 3.4–5.3)
PROT SERPL-MCNC: 6 G/DL (ref 6.4–8.3)
PROT/CREAT 24H UR: 0.1 MG/MG CR (ref 0–0.2)
RBC # BLD AUTO: 3.53 10E6/UL (ref 3.8–5.2)
RBC MORPH BLD: NORMAL
SODIUM SERPL-SCNC: 137 MMOL/L (ref 136–145)
URATE SERPL-MCNC: 4.3 MG/DL (ref 2.4–5.7)
WBC # BLD AUTO: 12 10E3/UL (ref 4–11)

## 2022-08-13 PROCEDURE — 96372 THER/PROPH/DIAG INJ SC/IM: CPT | Performed by: OBSTETRICS & GYNECOLOGY

## 2022-08-13 PROCEDURE — 85027 COMPLETE CBC AUTOMATED: CPT | Performed by: OBSTETRICS & GYNECOLOGY

## 2022-08-13 PROCEDURE — G0463 HOSPITAL OUTPT CLINIC VISIT: HCPCS | Mod: 25

## 2022-08-13 PROCEDURE — 84550 ASSAY OF BLOOD/URIC ACID: CPT | Performed by: OBSTETRICS & GYNECOLOGY

## 2022-08-13 PROCEDURE — 80053 COMPREHEN METABOLIC PANEL: CPT | Performed by: OBSTETRICS & GYNECOLOGY

## 2022-08-13 PROCEDURE — 250N000011 HC RX IP 250 OP 636: Performed by: OBSTETRICS & GYNECOLOGY

## 2022-08-13 PROCEDURE — 84156 ASSAY OF PROTEIN URINE: CPT | Performed by: OBSTETRICS & GYNECOLOGY

## 2022-08-13 PROCEDURE — 59025 FETAL NON-STRESS TEST: CPT

## 2022-08-13 PROCEDURE — 36415 COLL VENOUS BLD VENIPUNCTURE: CPT | Performed by: OBSTETRICS & GYNECOLOGY

## 2022-08-13 RX ORDER — ASPIRIN 81 MG/1
81 TABLET, CHEWABLE ORAL DAILY
Status: ON HOLD | COMMUNITY
End: 2022-08-27

## 2022-08-13 RX ORDER — BETAMETHASONE SODIUM PHOSPHATE AND BETAMETHASONE ACETATE 3; 3 MG/ML; MG/ML
12 INJECTION, SUSPENSION INTRA-ARTICULAR; INTRALESIONAL; INTRAMUSCULAR; SOFT TISSUE EVERY 24 HOURS
Status: DISCONTINUED | OUTPATIENT
Start: 2022-08-13 | End: 2022-08-13 | Stop reason: HOSPADM

## 2022-08-13 RX ADMIN — BETAMETHASONE SODIUM PHOSPHATE AND BETAMETHASONE ACETATE 12 MG: 3; 3 INJECTION, SUSPENSION INTRA-ARTICULAR; INTRALESIONAL; INTRAMUSCULAR at 12:01

## 2022-08-13 ASSESSMENT — ACTIVITIES OF DAILY LIVING (ADL): ADLS_ACUITY_SCORE: 35

## 2022-08-13 NOTE — CARE PLAN
Data: Patient assessed in the [facility] for BP evaluation, BMZ, NST  Action:  EFM/EUM on  moderate varability, + accelerations, no decelerations, no contractions,  Response: Orders  per Lindy Hernandez who is on the unit and strip reviewed  Patient verbalized understanding of education and verbalized agreement with plan.   NST reviewed by Dr Hernandez, also labs reviewed, 2nd BMZ given first was in clinic 08/12, all printed out discharge instructions verbally reviewed with patient questions answered, discharged ambulatory in good spirits

## 2022-08-13 NOTE — DISCHARGE INSTRUCTIONS
Discharge Instruction for Undelivered Patients      You were seen for: serial BP's, NST, lab work, Betamethasone   We Consulted: Dr Hernandez      Diet:   regular     Activity:  Usual activities    Call your provider if you notice:  Swelling in your face or increased swelling in your hands or legs.  Headaches that are not relieved by Tylenol (acetaminophen).  Changes in your vision (blurring: seeing spots or stars.)  Nausea (sick to your stomach) and vomiting (throwing up).   Weight gain of 5 pounds or more per week.  Heartburn that doesn't go away.  Signs of bladder infection: pain when you urinate (use the toilet), need to go more often and more urgently.  The bag of wilkes (rupture of membranes) breaks, or you notice leaking in your underwear.  Bright red blood in your underwear.  Abdominal (lower belly) or stomach pain.  Second (plus) baby: Contractions (tightening) less than 10 minutes apart and getting stronger.  Increase or change in vaginal discharge (note the color and amount)    Follow-up:  As scheduled with [facility]

## 2022-08-18 ENCOUNTER — HOSPITAL ENCOUNTER (OUTPATIENT)
Age: 32
End: 2022-08-18
Attending: OBSTETRICS & GYNECOLOGY | Admitting: OBSTETRICS & GYNECOLOGY
Payer: COMMERCIAL

## 2022-08-23 ENCOUNTER — LAB (OUTPATIENT)
Dept: URGENT CARE | Facility: URGENT CARE | Age: 32
End: 2022-08-23
Payer: COMMERCIAL

## 2022-08-23 DIAGNOSIS — Z20.822 ENCOUNTER FOR LABORATORY TESTING FOR COVID-19 VIRUS: ICD-10-CM

## 2022-08-23 PROCEDURE — U0005 INFEC AGEN DETEC AMPLI PROBE: HCPCS

## 2022-08-23 PROCEDURE — U0003 INFECTIOUS AGENT DETECTION BY NUCLEIC ACID (DNA OR RNA); SEVERE ACUTE RESPIRATORY SYNDROME CORONAVIRUS 2 (SARS-COV-2) (CORONAVIRUS DISEASE [COVID-19]), AMPLIFIED PROBE TECHNIQUE, MAKING USE OF HIGH THROUGHPUT TECHNOLOGIES AS DESCRIBED BY CMS-2020-01-R: HCPCS

## 2022-08-24 LAB — SARS-COV-2 RNA RESP QL NAA+PROBE: NEGATIVE

## 2022-08-25 ENCOUNTER — HOSPITAL ENCOUNTER (INPATIENT)
Facility: CLINIC | Age: 32
LOS: 2 days | Discharge: HOME | End: 2022-08-27
Attending: OBSTETRICS & GYNECOLOGY | Admitting: OBSTETRICS & GYNECOLOGY
Payer: COMMERCIAL

## 2022-08-25 LAB
ABO/RH(D): NORMAL
ALT SERPL W P-5'-P-CCNC: 11 U/L (ref 10–35)
ANTIBODY SCREEN: NEGATIVE
AST SERPL W P-5'-P-CCNC: 19 U/L (ref 10–35)
ERYTHROCYTE [DISTWIDTH] IN BLOOD BY AUTOMATED COUNT: 14 % (ref 10–15)
HCT VFR BLD AUTO: 29.4 % (ref 35–47)
HGB BLD-MCNC: 9.5 G/DL (ref 11.7–15.7)
MCH RBC QN AUTO: 27.6 PG (ref 26.5–33)
MCHC RBC AUTO-ENTMCNC: 32.3 G/DL (ref 31.5–36.5)
MCV RBC AUTO: 86 FL (ref 78–100)
PLATELET # BLD AUTO: 122 10E3/UL (ref 150–450)
RBC # BLD AUTO: 3.44 10E6/UL (ref 3.8–5.2)
SPECIMEN EXPIRATION DATE: NORMAL
URATE SERPL-MCNC: 3.8 MG/DL (ref 2.4–5.7)
WBC # BLD AUTO: 9.9 10E3/UL (ref 4–11)

## 2022-08-25 PROCEDURE — 86780 TREPONEMA PALLIDUM: CPT | Performed by: OBSTETRICS & GYNECOLOGY

## 2022-08-25 PROCEDURE — 120N000001 HC R&B MED SURG/OB

## 2022-08-25 PROCEDURE — 84450 TRANSFERASE (AST) (SGOT): CPT | Performed by: OBSTETRICS & GYNECOLOGY

## 2022-08-25 PROCEDURE — 86901 BLOOD TYPING SEROLOGIC RH(D): CPT | Performed by: OBSTETRICS & GYNECOLOGY

## 2022-08-25 PROCEDURE — 84460 ALANINE AMINO (ALT) (SGPT): CPT | Performed by: OBSTETRICS & GYNECOLOGY

## 2022-08-25 PROCEDURE — 84550 ASSAY OF BLOOD/URIC ACID: CPT | Performed by: OBSTETRICS & GYNECOLOGY

## 2022-08-25 PROCEDURE — 250N000013 HC RX MED GY IP 250 OP 250 PS 637: Performed by: OBSTETRICS & GYNECOLOGY

## 2022-08-25 PROCEDURE — 85027 COMPLETE CBC AUTOMATED: CPT | Performed by: OBSTETRICS & GYNECOLOGY

## 2022-08-25 RX ORDER — NALOXONE HYDROCHLORIDE 0.4 MG/ML
0.4 INJECTION, SOLUTION INTRAMUSCULAR; INTRAVENOUS; SUBCUTANEOUS
Status: DISCONTINUED | OUTPATIENT
Start: 2022-08-25 | End: 2022-08-26

## 2022-08-25 RX ORDER — MISOPROSTOL 200 UG/1
800 TABLET ORAL
Status: DISCONTINUED | OUTPATIENT
Start: 2022-08-25 | End: 2022-08-26

## 2022-08-25 RX ORDER — CARBOPROST TROMETHAMINE 250 UG/ML
250 INJECTION, SOLUTION INTRAMUSCULAR
Status: DISCONTINUED | OUTPATIENT
Start: 2022-08-25 | End: 2022-08-26

## 2022-08-25 RX ORDER — NALOXONE HYDROCHLORIDE 0.4 MG/ML
0.2 INJECTION, SOLUTION INTRAMUSCULAR; INTRAVENOUS; SUBCUTANEOUS
Status: DISCONTINUED | OUTPATIENT
Start: 2022-08-25 | End: 2022-08-26

## 2022-08-25 RX ORDER — TRANEXAMIC ACID 10 MG/ML
1 INJECTION, SOLUTION INTRAVENOUS EVERY 30 MIN PRN
Status: DISCONTINUED | OUTPATIENT
Start: 2022-08-25 | End: 2022-08-26

## 2022-08-25 RX ORDER — KETOROLAC TROMETHAMINE 30 MG/ML
30 INJECTION, SOLUTION INTRAMUSCULAR; INTRAVENOUS
Status: DISCONTINUED | OUTPATIENT
Start: 2022-08-25 | End: 2022-08-26

## 2022-08-25 RX ORDER — PROCHLORPERAZINE MALEATE 10 MG
10 TABLET ORAL EVERY 6 HOURS PRN
Status: DISCONTINUED | OUTPATIENT
Start: 2022-08-25 | End: 2022-08-26

## 2022-08-25 RX ORDER — OXYTOCIN 10 [USP'U]/ML
10 INJECTION, SOLUTION INTRAMUSCULAR; INTRAVENOUS
Status: DISCONTINUED | OUTPATIENT
Start: 2022-08-25 | End: 2022-08-26

## 2022-08-25 RX ORDER — METHYLERGONOVINE MALEATE 0.2 MG/ML
200 INJECTION INTRAVENOUS
Status: DISCONTINUED | OUTPATIENT
Start: 2022-08-25 | End: 2022-08-26

## 2022-08-25 RX ORDER — PROCHLORPERAZINE 25 MG
25 SUPPOSITORY, RECTAL RECTAL EVERY 12 HOURS PRN
Status: DISCONTINUED | OUTPATIENT
Start: 2022-08-25 | End: 2022-08-26

## 2022-08-25 RX ORDER — CITRIC ACID/SODIUM CITRATE 334-500MG
30 SOLUTION, ORAL ORAL
Status: DISCONTINUED | OUTPATIENT
Start: 2022-08-25 | End: 2022-08-26

## 2022-08-25 RX ORDER — ONDANSETRON 4 MG/1
4 TABLET, ORALLY DISINTEGRATING ORAL EVERY 6 HOURS PRN
Status: DISCONTINUED | OUTPATIENT
Start: 2022-08-25 | End: 2022-08-26

## 2022-08-25 RX ORDER — FENTANYL CITRATE 50 UG/ML
100 INJECTION, SOLUTION INTRAMUSCULAR; INTRAVENOUS
Status: DISCONTINUED | OUTPATIENT
Start: 2022-08-25 | End: 2022-08-26

## 2022-08-25 RX ORDER — ONDANSETRON 2 MG/ML
4 INJECTION INTRAMUSCULAR; INTRAVENOUS EVERY 6 HOURS PRN
Status: DISCONTINUED | OUTPATIENT
Start: 2022-08-25 | End: 2022-08-26

## 2022-08-25 RX ORDER — METOCLOPRAMIDE HYDROCHLORIDE 5 MG/ML
10 INJECTION INTRAMUSCULAR; INTRAVENOUS EVERY 6 HOURS PRN
Status: DISCONTINUED | OUTPATIENT
Start: 2022-08-25 | End: 2022-08-26

## 2022-08-25 RX ORDER — MISOPROSTOL 100 UG/1
25 TABLET ORAL
Status: DISCONTINUED | OUTPATIENT
Start: 2022-08-25 | End: 2022-08-26

## 2022-08-25 RX ORDER — METOCLOPRAMIDE 10 MG/1
10 TABLET ORAL EVERY 6 HOURS PRN
Status: DISCONTINUED | OUTPATIENT
Start: 2022-08-25 | End: 2022-08-26

## 2022-08-25 RX ORDER — OXYTOCIN/0.9 % SODIUM CHLORIDE 30/500 ML
100-340 PLASTIC BAG, INJECTION (ML) INTRAVENOUS CONTINUOUS PRN
Status: DISCONTINUED | OUTPATIENT
Start: 2022-08-25 | End: 2022-08-26

## 2022-08-25 RX ORDER — IBUPROFEN 800 MG/1
800 TABLET, FILM COATED ORAL
Status: DISCONTINUED | OUTPATIENT
Start: 2022-08-25 | End: 2022-08-26

## 2022-08-25 RX ORDER — SODIUM CHLORIDE, SODIUM LACTATE, POTASSIUM CHLORIDE, CALCIUM CHLORIDE 600; 310; 30; 20 MG/100ML; MG/100ML; MG/100ML; MG/100ML
INJECTION, SOLUTION INTRAVENOUS CONTINUOUS
Status: DISCONTINUED | OUTPATIENT
Start: 2022-08-26 | End: 2022-08-26

## 2022-08-25 RX ORDER — OXYTOCIN/0.9 % SODIUM CHLORIDE 30/500 ML
340 PLASTIC BAG, INJECTION (ML) INTRAVENOUS CONTINUOUS PRN
Status: DISCONTINUED | OUTPATIENT
Start: 2022-08-25 | End: 2022-08-26

## 2022-08-25 RX ORDER — ZOLPIDEM TARTRATE 5 MG/1
5 TABLET ORAL
Status: DISCONTINUED | OUTPATIENT
Start: 2022-08-25 | End: 2022-08-26

## 2022-08-25 RX ORDER — MISOPROSTOL 200 UG/1
400 TABLET ORAL
Status: DISCONTINUED | OUTPATIENT
Start: 2022-08-25 | End: 2022-08-26

## 2022-08-25 RX ADMIN — Medication 25 MCG: at 22:55

## 2022-08-25 ASSESSMENT — ACTIVITIES OF DAILY LIVING (ADL): ADLS_ACUITY_SCORE: 20

## 2022-08-26 ENCOUNTER — ANESTHESIA EVENT (OUTPATIENT)
Dept: OBGYN | Facility: CLINIC | Age: 32
End: 2022-08-26
Payer: COMMERCIAL

## 2022-08-26 ENCOUNTER — ANESTHESIA (OUTPATIENT)
Dept: OBGYN | Facility: CLINIC | Age: 32
End: 2022-08-26
Payer: COMMERCIAL

## 2022-08-26 LAB — T PALLIDUM AB SER QL: NONREACTIVE

## 2022-08-26 PROCEDURE — 258N000003 HC RX IP 258 OP 636: Performed by: ANESTHESIOLOGY

## 2022-08-26 PROCEDURE — 00HU33Z INSERTION OF INFUSION DEVICE INTO SPINAL CANAL, PERCUTANEOUS APPROACH: ICD-10-PCS | Performed by: OBSTETRICS & GYNECOLOGY

## 2022-08-26 PROCEDURE — 722N000001 HC LABOR CARE VAGINAL DELIVERY SINGLE

## 2022-08-26 PROCEDURE — 250N000009 HC RX 250: Performed by: OBSTETRICS & GYNECOLOGY

## 2022-08-26 PROCEDURE — 10907ZC DRAINAGE OF AMNIOTIC FLUID, THERAPEUTIC FROM PRODUCTS OF CONCEPTION, VIA NATURAL OR ARTIFICIAL OPENING: ICD-10-PCS | Performed by: OBSTETRICS & GYNECOLOGY

## 2022-08-26 PROCEDURE — 250N000011 HC RX IP 250 OP 636: Performed by: ANESTHESIOLOGY

## 2022-08-26 PROCEDURE — 370N000003 HC ANESTHESIA WARD SERVICE

## 2022-08-26 PROCEDURE — 258N000003 HC RX IP 258 OP 636: Performed by: OBSTETRICS & GYNECOLOGY

## 2022-08-26 PROCEDURE — 3E0R3BZ INTRODUCTION OF ANESTHETIC AGENT INTO SPINAL CANAL, PERCUTANEOUS APPROACH: ICD-10-PCS | Performed by: OBSTETRICS & GYNECOLOGY

## 2022-08-26 PROCEDURE — 250N000013 HC RX MED GY IP 250 OP 250 PS 637: Performed by: OBSTETRICS & GYNECOLOGY

## 2022-08-26 PROCEDURE — 120N000001 HC R&B MED SURG/OB

## 2022-08-26 RX ORDER — ACETAMINOPHEN 325 MG/1
650 TABLET ORAL EVERY 4 HOURS PRN
Status: DISCONTINUED | OUTPATIENT
Start: 2022-08-26 | End: 2022-08-26

## 2022-08-26 RX ORDER — KETOROLAC TROMETHAMINE 30 MG/ML
30 INJECTION, SOLUTION INTRAMUSCULAR; INTRAVENOUS
Status: DISCONTINUED | OUTPATIENT
Start: 2022-08-26 | End: 2022-08-26

## 2022-08-26 RX ORDER — TRANEXAMIC ACID 10 MG/ML
1 INJECTION, SOLUTION INTRAVENOUS EVERY 30 MIN PRN
Status: DISCONTINUED | OUTPATIENT
Start: 2022-08-26 | End: 2022-08-27 | Stop reason: HOSPADM

## 2022-08-26 RX ORDER — METOCLOPRAMIDE HYDROCHLORIDE 5 MG/ML
10 INJECTION INTRAMUSCULAR; INTRAVENOUS EVERY 6 HOURS PRN
Status: DISCONTINUED | OUTPATIENT
Start: 2022-08-26 | End: 2022-08-26

## 2022-08-26 RX ORDER — LIDOCAINE 40 MG/G
CREAM TOPICAL
Status: DISCONTINUED | OUTPATIENT
Start: 2022-08-26 | End: 2022-08-26

## 2022-08-26 RX ORDER — PRENATAL VIT/IRON FUM/FOLIC AC 27MG-0.8MG
1 TABLET ORAL DAILY
Status: DISCONTINUED | OUTPATIENT
Start: 2022-08-26 | End: 2022-08-27 | Stop reason: HOSPADM

## 2022-08-26 RX ORDER — PROCHLORPERAZINE 25 MG
25 SUPPOSITORY, RECTAL RECTAL EVERY 12 HOURS PRN
Status: DISCONTINUED | OUTPATIENT
Start: 2022-08-26 | End: 2022-08-26

## 2022-08-26 RX ORDER — CARBOPROST TROMETHAMINE 250 UG/ML
250 INJECTION, SOLUTION INTRAMUSCULAR
Status: DISCONTINUED | OUTPATIENT
Start: 2022-08-26 | End: 2022-08-26

## 2022-08-26 RX ORDER — MISOPROSTOL 200 UG/1
800 TABLET ORAL
Status: DISCONTINUED | OUTPATIENT
Start: 2022-08-26 | End: 2022-08-26

## 2022-08-26 RX ORDER — OXYTOCIN 10 [USP'U]/ML
10 INJECTION, SOLUTION INTRAMUSCULAR; INTRAVENOUS
Status: DISCONTINUED | OUTPATIENT
Start: 2022-08-26 | End: 2022-08-27 | Stop reason: HOSPADM

## 2022-08-26 RX ORDER — METHYLERGONOVINE MALEATE 0.2 MG/ML
200 INJECTION INTRAVENOUS
Status: DISCONTINUED | OUTPATIENT
Start: 2022-08-26 | End: 2022-08-26

## 2022-08-26 RX ORDER — MISOPROSTOL 200 UG/1
400 TABLET ORAL
Status: DISCONTINUED | OUTPATIENT
Start: 2022-08-26 | End: 2022-08-26

## 2022-08-26 RX ORDER — HYDROCORTISONE 25 MG/G
CREAM TOPICAL 3 TIMES DAILY PRN
Status: DISCONTINUED | OUTPATIENT
Start: 2022-08-26 | End: 2022-08-27 | Stop reason: HOSPADM

## 2022-08-26 RX ORDER — CITRIC ACID/SODIUM CITRATE 334-500MG
30 SOLUTION, ORAL ORAL
Status: DISCONTINUED | OUTPATIENT
Start: 2022-08-26 | End: 2022-08-26

## 2022-08-26 RX ORDER — FENTANYL CITRATE-0.9 % NACL/PF 10 MCG/ML
100 PLASTIC BAG, INJECTION (ML) INTRAVENOUS EVERY 5 MIN PRN
Status: DISCONTINUED | OUTPATIENT
Start: 2022-08-26 | End: 2022-08-26

## 2022-08-26 RX ORDER — TRANEXAMIC ACID 10 MG/ML
1 INJECTION, SOLUTION INTRAVENOUS EVERY 30 MIN PRN
Status: DISCONTINUED | OUTPATIENT
Start: 2022-08-26 | End: 2022-08-26

## 2022-08-26 RX ORDER — NALOXONE HYDROCHLORIDE 0.4 MG/ML
0.2 INJECTION, SOLUTION INTRAMUSCULAR; INTRAVENOUS; SUBCUTANEOUS
Status: DISCONTINUED | OUTPATIENT
Start: 2022-08-26 | End: 2022-08-26

## 2022-08-26 RX ORDER — NALOXONE HYDROCHLORIDE 0.4 MG/ML
0.4 INJECTION, SOLUTION INTRAMUSCULAR; INTRAVENOUS; SUBCUTANEOUS
Status: DISCONTINUED | OUTPATIENT
Start: 2022-08-26 | End: 2022-08-26

## 2022-08-26 RX ORDER — ONDANSETRON 4 MG/1
4 TABLET, ORALLY DISINTEGRATING ORAL EVERY 6 HOURS PRN
Status: DISCONTINUED | OUTPATIENT
Start: 2022-08-26 | End: 2022-08-26

## 2022-08-26 RX ORDER — DOCUSATE SODIUM 100 MG/1
100 CAPSULE, LIQUID FILLED ORAL DAILY
Status: DISCONTINUED | OUTPATIENT
Start: 2022-08-26 | End: 2022-08-27 | Stop reason: HOSPADM

## 2022-08-26 RX ORDER — NALBUPHINE HYDROCHLORIDE 10 MG/ML
2.5-5 INJECTION, SOLUTION INTRAMUSCULAR; INTRAVENOUS; SUBCUTANEOUS EVERY 6 HOURS PRN
Status: DISCONTINUED | OUTPATIENT
Start: 2022-08-26 | End: 2022-08-26

## 2022-08-26 RX ORDER — MODIFIED LANOLIN
OINTMENT (GRAM) TOPICAL
Status: DISCONTINUED | OUTPATIENT
Start: 2022-08-26 | End: 2022-08-27 | Stop reason: HOSPADM

## 2022-08-26 RX ORDER — OXYTOCIN 10 [USP'U]/ML
10 INJECTION, SOLUTION INTRAMUSCULAR; INTRAVENOUS
Status: DISCONTINUED | OUTPATIENT
Start: 2022-08-26 | End: 2022-08-26

## 2022-08-26 RX ORDER — CARBOPROST TROMETHAMINE 250 UG/ML
250 INJECTION, SOLUTION INTRAMUSCULAR
Status: DISCONTINUED | OUTPATIENT
Start: 2022-08-26 | End: 2022-08-27 | Stop reason: HOSPADM

## 2022-08-26 RX ORDER — IBUPROFEN 800 MG/1
800 TABLET, FILM COATED ORAL
Status: DISCONTINUED | OUTPATIENT
Start: 2022-08-26 | End: 2022-08-26

## 2022-08-26 RX ORDER — MISOPROSTOL 200 UG/1
800 TABLET ORAL
Status: DISCONTINUED | OUTPATIENT
Start: 2022-08-26 | End: 2022-08-27 | Stop reason: HOSPADM

## 2022-08-26 RX ORDER — MISOPROSTOL 200 UG/1
400 TABLET ORAL
Status: DISCONTINUED | OUTPATIENT
Start: 2022-08-26 | End: 2022-08-27 | Stop reason: HOSPADM

## 2022-08-26 RX ORDER — SODIUM CHLORIDE, SODIUM LACTATE, POTASSIUM CHLORIDE, CALCIUM CHLORIDE 600; 310; 30; 20 MG/100ML; MG/100ML; MG/100ML; MG/100ML
INJECTION, SOLUTION INTRAVENOUS CONTINUOUS
Status: DISCONTINUED | OUTPATIENT
Start: 2022-08-26 | End: 2022-08-26

## 2022-08-26 RX ORDER — OXYTOCIN/0.9 % SODIUM CHLORIDE 30/500 ML
340 PLASTIC BAG, INJECTION (ML) INTRAVENOUS CONTINUOUS PRN
Status: DISCONTINUED | OUTPATIENT
Start: 2022-08-26 | End: 2022-08-27 | Stop reason: HOSPADM

## 2022-08-26 RX ORDER — METHYLERGONOVINE MALEATE 0.2 MG/ML
200 INJECTION INTRAVENOUS
Status: DISCONTINUED | OUTPATIENT
Start: 2022-08-26 | End: 2022-08-27 | Stop reason: HOSPADM

## 2022-08-26 RX ORDER — FENTANYL CITRATE 50 UG/ML
50 INJECTION, SOLUTION INTRAMUSCULAR; INTRAVENOUS EVERY 30 MIN PRN
Status: DISCONTINUED | OUTPATIENT
Start: 2022-08-26 | End: 2022-08-26

## 2022-08-26 RX ORDER — ONDANSETRON 2 MG/ML
4 INJECTION INTRAMUSCULAR; INTRAVENOUS EVERY 6 HOURS PRN
Status: DISCONTINUED | OUTPATIENT
Start: 2022-08-26 | End: 2022-08-26

## 2022-08-26 RX ORDER — BUPIVACAINE HYDROCHLORIDE 2.5 MG/ML
INJECTION, SOLUTION EPIDURAL; INFILTRATION; INTRACAUDAL
Status: COMPLETED | OUTPATIENT
Start: 2022-08-26 | End: 2022-08-26

## 2022-08-26 RX ORDER — METOCLOPRAMIDE 10 MG/1
10 TABLET ORAL EVERY 6 HOURS PRN
Status: DISCONTINUED | OUTPATIENT
Start: 2022-08-26 | End: 2022-08-26

## 2022-08-26 RX ORDER — BISACODYL 10 MG
10 SUPPOSITORY, RECTAL RECTAL DAILY PRN
Status: DISCONTINUED | OUTPATIENT
Start: 2022-08-26 | End: 2022-08-27 | Stop reason: HOSPADM

## 2022-08-26 RX ORDER — SODIUM CHLORIDE, SODIUM LACTATE, POTASSIUM CHLORIDE, CALCIUM CHLORIDE 600; 310; 30; 20 MG/100ML; MG/100ML; MG/100ML; MG/100ML
INJECTION, SOLUTION INTRAVENOUS CONTINUOUS PRN
Status: DISCONTINUED | OUTPATIENT
Start: 2022-08-26 | End: 2022-08-26

## 2022-08-26 RX ORDER — ACETAMINOPHEN 325 MG/1
650 TABLET ORAL EVERY 4 HOURS PRN
Status: DISCONTINUED | OUTPATIENT
Start: 2022-08-26 | End: 2022-08-27 | Stop reason: HOSPADM

## 2022-08-26 RX ORDER — OXYTOCIN/0.9 % SODIUM CHLORIDE 30/500 ML
1-24 PLASTIC BAG, INJECTION (ML) INTRAVENOUS CONTINUOUS
Status: DISCONTINUED | OUTPATIENT
Start: 2022-08-26 | End: 2022-08-26

## 2022-08-26 RX ORDER — OXYTOCIN/0.9 % SODIUM CHLORIDE 30/500 ML
100-340 PLASTIC BAG, INJECTION (ML) INTRAVENOUS CONTINUOUS PRN
Status: DISCONTINUED | OUTPATIENT
Start: 2022-08-26 | End: 2022-08-26

## 2022-08-26 RX ORDER — OXYTOCIN/0.9 % SODIUM CHLORIDE 30/500 ML
340 PLASTIC BAG, INJECTION (ML) INTRAVENOUS CONTINUOUS PRN
Status: DISCONTINUED | OUTPATIENT
Start: 2022-08-26 | End: 2022-08-26

## 2022-08-26 RX ORDER — PROCHLORPERAZINE MALEATE 10 MG
10 TABLET ORAL EVERY 6 HOURS PRN
Status: DISCONTINUED | OUTPATIENT
Start: 2022-08-26 | End: 2022-08-26

## 2022-08-26 RX ORDER — IBUPROFEN 800 MG/1
800 TABLET, FILM COATED ORAL EVERY 6 HOURS PRN
Status: DISCONTINUED | OUTPATIENT
Start: 2022-08-26 | End: 2022-08-27 | Stop reason: HOSPADM

## 2022-08-26 RX ADMIN — Medication 2 MILLI-UNITS/MIN: at 09:40

## 2022-08-26 RX ADMIN — Medication 100 ML/HR: at 16:39

## 2022-08-26 RX ADMIN — Medication 25 MCG: at 03:10

## 2022-08-26 RX ADMIN — DOCUSATE SODIUM 100 MG: 100 CAPSULE, LIQUID FILLED ORAL at 20:09

## 2022-08-26 RX ADMIN — IBUPROFEN 800 MG: 800 TABLET, FILM COATED ORAL at 17:13

## 2022-08-26 RX ADMIN — ACETAMINOPHEN 650 MG: 325 TABLET, FILM COATED ORAL at 20:15

## 2022-08-26 RX ADMIN — Medication 25 MCG: at 00:55

## 2022-08-26 RX ADMIN — Medication 25 MCG: at 07:32

## 2022-08-26 RX ADMIN — Medication 25 MCG: at 05:14

## 2022-08-26 RX ADMIN — PRENATAL VITAMINS-IRON FUMARATE 27 MG IRON-FOLIC ACID 0.8 MG TABLET 1 TABLET: at 20:09

## 2022-08-26 RX ADMIN — SODIUM CHLORIDE, POTASSIUM CHLORIDE, SODIUM LACTATE AND CALCIUM CHLORIDE 1000 ML: 600; 310; 30; 20 INJECTION, SOLUTION INTRAVENOUS at 11:13

## 2022-08-26 RX ADMIN — BUPIVACAINE HYDROCHLORIDE 10 ML: 2.5 INJECTION, SOLUTION EPIDURAL; INFILTRATION; INTRACAUDAL at 11:45

## 2022-08-26 RX ADMIN — Medication: at 11:54

## 2022-08-26 RX ADMIN — IBUPROFEN 800 MG: 800 TABLET, FILM COATED ORAL at 23:02

## 2022-08-26 ASSESSMENT — ACTIVITIES OF DAILY LIVING (ADL)
ADLS_ACUITY_SCORE: 20

## 2022-08-26 NOTE — PROVIDER NOTIFICATION
08/26/22 0740   Provider Notification   Provider Name/Title Dr. Enciso   Method of Notification At Bedside   Request Evaluate in Person   Notification Reason Status Update   SVE 1.5/60/-2. SROM, clear fluid. Plan per provider is to start Pitocin. Last dose of Cytotec was at 0732. Patient feeling occasional cramping, rates pain at 1-2.

## 2022-08-26 NOTE — PROGRESS NOTES
"Comfortable    /70   Temp 97.8  F (36.6  C) (Oral)   Resp 16   Ht 1.626 m (5' 4\")   Wt 83.5 kg (184 lb)   LMP 2021   BMI 31.58 kg/m     [place]: irreg  FHT 130s, mod variability, no decels, +accels  SVE 1.5/60/-2 AROM clear  GBS neg    A/P: 31 year old  @ 37w1d for IOL due to gestational HTN  - Pitocin  - EV prn  - Gestational thrombocytopenia, stable    Laly Enciso MD     "

## 2022-08-26 NOTE — PROVIDER NOTIFICATION
08/26/22 1148   Provider Notification   Provider Name/Title Dr. Enciso   Web paged per providers request- cervix 3/80/-1 and epidural placed.

## 2022-08-26 NOTE — PROVIDER NOTIFICATION
08/26/22 1220   Provider Notification   Provider Name/Title Dr. LILIYA Enciso   Method of Notification Phone   Request Evaluate - Remote   Notification Reason Labor Status     MD updated on patient status.  She is resting comfortably with her epidural.  She's shiloh every 1.5-3 minutes, palpating moderate.  , moderate variability, with accels and occasional variable decel.  The patient says she's feeling some pressure with contractions, but nothing uncomfortable.  Patient due to change positions at 1245.  MD would like SVE at that time and text page with update.

## 2022-08-26 NOTE — PROVIDER NOTIFICATION
08/26/22 1512   Provider Notification   Provider Name/Title Dr. LILIYA Enciso   Method of Notification Phone   Request Evaluate - Remote   Notification Reason SVE;Labor Status     MD updated on patient status.  SVE: 6/90/0, feeling pressure with contractions.  Variables with contractions, due for position change. MD will be to the unit after her last patient, unless needed sooner.

## 2022-08-26 NOTE — ANESTHESIA PROCEDURE NOTES
Epidural catheter Procedure Note    Pre-Procedure   Staff -        Anesthesiologist:  Reji Nash MD       Performed By: anesthesiologist       Referred By: Beatrice       Location: OB       Pre-Anesthestic Checklist: patient identified, IV checked, risks and benefits discussed, informed consent, monitors and equipment checked, pre-op evaluation, at physician/surgeon's request and post-op pain management  Timeout:       Correct Patient: Yes        Correct Procedure: Yes        Correct Site: Yes        Correct Position: Yes   Procedure Documentation  Procedure: epidural catheter       Patient Position: sitting       Patient Prep/Sterile Barriers: sterile gloves, mask, patient draped       Skin prep: Betadine       Local skin infiltrated with mL of 1% lidocaine.        Insertion Site: L3-4. (midline approach).       Technique: LORT saline        DINH at 5 cm.       Needle Type: ToGroup Phoebe Ingenicay needle       Needle Gauge: 17.        Needle Length (Inches): 3.5        Catheter: 19 G.          Catheter threaded easily.         6 cm epidural space.         Threaded 11 cm at skin.         # of attempts: 2 and  # of redirects:  0    Assessment/Narrative         Paresthesias: No.       Test dose of 3 mL lidocaine 1.5% w/ 1:200,000 epinephrine at 11:42 CDT.         Test dose negative, 3 minutes after injection, for signs of intravascular, subdural, or intrathecal injection.       Insertion/Infusion Method: LORT saline       Aspiration negative for Heme or CSF via Epidural Catheter.    Medication(s) Administered   0.25% Bupivacaine PF (Epidural) - EPIDURAL   10 mL - 8/26/2022 11:45:00 AM

## 2022-08-26 NOTE — PLAN OF CARE
Patient stable. x4 doses of cytotec given overnight. Marci q3-6 min on average. FHR moderate with accels. BP's stable. Continue to monitor.

## 2022-08-26 NOTE — PROVIDER NOTIFICATION
08/25/22 2441   Provider Notification   Provider Name/Title Dr. iMlan   Method of Notification Phone   Request Evaluate - Remote   Notification Reason Patient Arrived;Status Update   Dr. Aaorn Milan informed of patient arrival and assessment including the following:    Reason for maternal/fetal assessment  scheduled induction of labor for GHTN . Uterine contractions every 2-7min that patient reports feeling as tightening or not at all.  Patient denies s/s of pre-eclampsia.  Fetal status normal baseline, moderate variability, accelerations present and no decelerations. Plan per provider/orders received for cervical ripening with PO cytotec per protocol, 5mg Ambien PO PRN for sleep, PIH labs with no urine protein/creatinine ratio.   Will update pt on POC and continue to monitor.

## 2022-08-26 NOTE — PLAN OF CARE
Data: [facility]: 2022  9:24 PM.  Reason for maternal/fetal assessment is scheduled induction of labor for GHTN. Patient is a .  Prenatal record reviewed. Pregnancy  has been complicated by gestational hypertension.  Gestational Age 37w0d. VSS. Fetal movement active. Patient denies uterine contractions, leaking of vaginal fluid/rupture of membranes, vaginal bleeding, abdominal pain, pelvic pressure, nausea, vomiting, headache, visual disturbances, epigastric or URQ pain, significant edema. Support person, Darrell, is present.   Action: Verbal consent for EFM. Admission assessment completed. Bill of rights reviewed.  Response: Patient verbalized agreement with plan. Will contact Dr Aaron Milan with update and for further orders.

## 2022-08-26 NOTE — H&P
"2022      32yo  @ 37.1 wga here for IOL due to gHTN.  Of note, starting at ~ 33.5 wga, BPs were noted to be mildly elevated which persisted over 3 separate office visits.  Labs within normal however did have mildly low platelets at 126k on 22.  There have been no other complications during the pregnancy.        PNLs: B pos, ABS neg, RI, RPR NR, HIV NR, Hep Bs Ag neg, GBS NEG, COVID-19 NEG      /70   Temp 97.8  F (36.6  C) (Oral)   Resp 16   Ht 1.626 m (5' 4\")   Wt 83.5 kg (184 lb)   LMP 2021   BMI 31.58 kg/m    SVE: /2 per RN  FHT: cat 1   TOCO: irregular      Lab Results   Component Value Date    WBC 9.9 2022    WBC 7.7 2021     Lab Results   Component Value Date    RBC 3.44 2022    RBC 3.99 2021     Lab Results   Component Value Date    HGB 9.5 2022    HGB 11.2 2021     Lab Results   Component Value Date    HCT 29.4 2022    HCT 33.1 2021     No components found for: MCT  Lab Results   Component Value Date    MCV 86 2022    MCV 83 2021     Lab Results   Component Value Date    MCH 27.6 2022    MCH 28.1 2021     Lab Results   Component Value Date    MCHC 32.3 2022    MCHC 33.8 2021     Lab Results   Component Value Date    RDW 14.0 2022    RDW 14.3 2021     Lab Results   Component Value Date     2022     2021     Liver Function Studies - Recent Labs   Lab Test 226 22  1128   PROTTOTAL  --  6.0*   ALBUMIN  --  3.6   BILITOTAL  --  0.4   ALKPHOS  --  97   AST 19 20   ALT 11 11     Uric Acid   Date Value Ref Range Status   2022 3.8 2.4 - 5.7 mg/dL Final   2020 4.2 2.6 - 6.0 mg/dL Final       32yo  @ 37.1 wga here for IOL due to gHTN.  AVSS.  - proceed with cervical ripening with PO cytotec protocol due to unfavorable Price score  - plan for likely pitocin, amniotomy tomorrow  - platelets 126k on , 116k on , " now 122k.  Likely gestational thrombocytopenia.  - GBS neg  - COVID-19 neg  - EFW 7 lb      SHOSHANA HUMPHRIES MD

## 2022-08-26 NOTE — ANESTHESIA PREPROCEDURE EVALUATION
Anesthesia Pre-Procedure Evaluation    Patient: Christina Orosco   MRN: [id number] : 1990        Procedure :           Past Medical History:   Diagnosis Date     Infectious mononucleosis      LSIL (low grade squamous intraepithelial lesion) on Pap smear 2007    colp - WNL     Migraines      NONSPECIFIC MEDICAL HISTORY     rt elbow fracturem snowboarding     NONSPECIFIC MEDICAL HISTORY 9 yrs old    bilat wrist fractures due to injury      Past Surgical History:   Procedure Laterality Date     ENDOSCOPIC RETROGRADE CHOLANGIOPANCREATOGRAM N/A 3/23/2021    Procedure: ENDOSCOPIC RETROGRADE CHOLANGIOPANCREATOGRAPHY, WITH STONE EXTRACTION AND SPHINCTEROTOMY;  Surgeon: Joselin Aponte MD;  Location:  OR     ENDOSCOPIC ULTRASOUND UPPER GASTROINTESTINAL TRACT (GI) N/A 3/23/2021    Procedure: ENDOSCOPIC Ultrasound;  Surgeon: Joselin Aponte MD;  Location:  OR     HC TOOTH EXTRACTION W/FORCEP      wisdom teeth      LAPAROSCOPIC CHOLECYSTECTOMY N/A 3/24/2021    Procedure: LAPAROSCOPIC CHOLECYSTECTOMY;  Surgeon: Laurie Fernandez MD;  Location:  OR     SURGICAL HISTORY OF -       rt elbow surgery x2 for fracture      Allergies   Allergen Reactions     Amoxicillin Rash     Augmentin Hives     Penicillin [Penicillins] Hives      Social History     Tobacco Use     Smoking status: Former Smoker     Types: Cigarettes     Quit date: 2011     Years since quittin.2     Smokeless tobacco: Never Used   Substance Use Topics     Alcohol use: Not Currently      Wt Readings from Last 1 Encounters:   22 83.5 kg (184 lb)        Anesthesia Evaluation   Pt has had prior anesthetic. Type: OB Labor Epidural.    No history of anesthetic complications       ROS/MED HX  ENT/Pulmonary:  - neg pulmonary ROS     Neurologic:  - neg neurologic ROS     Cardiovascular:  - neg cardiovascular ROS     METS/Exercise Tolerance:     Hematologic:  - neg hematologic  ROS     Musculoskeletal:        GI/Hepatic:  - neg GI/hepatic ROS     Renal/Genitourinary:       Endo:     (+) Obesity,     Psychiatric/Substance Use:  - neg psychiatric ROS     Infectious Disease:       Malignancy:       Other:     (-) previous  and TOLAC candidate       Physical Exam    Airway        Mallampati: II   TM distance: > 3 FB   Neck ROM: full   Mouth opening: > 3 cm    Respiratory Devices and Support         Dental  no notable dental history         Cardiovascular   cardiovascular exam normal          Pulmonary   pulmonary exam normal                OUTSIDE LABS:  CBC:   Lab Results   Component Value Date    WBC 9.9 2022    WBC 12.0 (H) 2022    HGB 9.5 (L) 2022    HGB 9.8 (L) 2022    HCT 29.4 (L) 2022    HCT 30.9 (L) 2022     (L) 2022     (L) 2022     BMP:   Lab Results   Component Value Date     2022     2021    POTASSIUM 3.5 2022    POTASSIUM 3.5 2021    CHLORIDE 105 2022    CHLORIDE 108 2021    CO2 20 (L) 2022    CO2 27 2021    BUN 4.8 (L) 2022    BUN 7 2021    CR 0.57 2022    CR 0.75 2021    GLC 94 2022    GLC 79 2021     COAGS:   Lab Results   Component Value Date    INR 1.14 2021     POC:   Lab Results   Component Value Date    HCG Negative 2021     HEPATIC:   Lab Results   Component Value Date    ALBUMIN 3.6 2022    PROTTOTAL 6.0 (L) 2022    ALT 11 2022    AST 19 2022    ALKPHOS 97 2022    BILITOTAL 0.4 2022     OTHER:   Lab Results   Component Value Date    RANDAL 9.0 2022    LIPASE 148 2021    TSH 1.41 2020       Anesthesia Plan    ASA Status:  2      Anesthesia Type: Epidural.              Consents    Anesthesia Plan(s) and associated risks, benefits, and realistic alternatives discussed. Questions answered and patient/representative(s) expressed understanding.    - Discussed:     - Discussed with:   Patient         Postoperative Care            Comments:           neg OB ROS.       Reji Nash MD

## 2022-08-26 NOTE — PLAN OF CARE
"1220- Admits to feeling \"maybe 3\" tightening's this morning. 4 Decels noted on monitor this AM. Accels present, moderate variability. O2 has been off this AM, has maintained O2 sats 95% and above on room air. Just now, patient fell asleep for a nap, O2 sats dropped to 94%. O2 applied at 2 L for nap. Update given to Dr. Enciso. Plan per provider is to alert provider if more than 2 fetal heart rate decelerations an hour.   "

## 2022-08-26 NOTE — PROVIDER NOTIFICATION
08/26/22 1555   Provider Notification   Provider Name/Title Dr. LILIYA Enciso   Method of Notification In Department   Request Evaluate in Person   Notification Reason SVE;Decels     Patient having recurrent variables with contractions.  MD will be at the bedside to assess in a few minutes. No new orders at this time.

## 2022-08-27 VITALS
HEIGHT: 64 IN | TEMPERATURE: 98 F | SYSTOLIC BLOOD PRESSURE: 125 MMHG | DIASTOLIC BLOOD PRESSURE: 83 MMHG | RESPIRATION RATE: 18 BRPM | HEART RATE: 96 BPM | BODY MASS INDEX: 31.41 KG/M2 | WEIGHT: 184 LBS

## 2022-08-27 PROCEDURE — 250N000013 HC RX MED GY IP 250 OP 250 PS 637: Performed by: OBSTETRICS & GYNECOLOGY

## 2022-08-27 RX ADMIN — PRENATAL VITAMINS-IRON FUMARATE 27 MG IRON-FOLIC ACID 0.8 MG TABLET 1 TABLET: at 09:15

## 2022-08-27 RX ADMIN — ACETAMINOPHEN 650 MG: 325 TABLET, FILM COATED ORAL at 02:47

## 2022-08-27 RX ADMIN — DOCUSATE SODIUM 100 MG: 100 CAPSULE, LIQUID FILLED ORAL at 09:15

## 2022-08-27 RX ADMIN — IBUPROFEN 800 MG: 800 TABLET, FILM COATED ORAL at 13:47

## 2022-08-27 RX ADMIN — ACETAMINOPHEN 650 MG: 325 TABLET, FILM COATED ORAL at 15:36

## 2022-08-27 RX ADMIN — IBUPROFEN 800 MG: 800 TABLET, FILM COATED ORAL at 07:10

## 2022-08-27 RX ADMIN — ACETAMINOPHEN 650 MG: 325 TABLET, FILM COATED ORAL at 11:18

## 2022-08-27 ASSESSMENT — ACTIVITIES OF DAILY LIVING (ADL)
ADLS_ACUITY_SCORE: 20

## 2022-08-27 NOTE — DISCHARGE INSTRUCTIONS
You should be on pelvic rest with no heavy lifting >25 lb for 6 weeks.  Please call or return if you have fever >/= 100.4 degrees Farenheit (38.0 degrees Celsius), severe worsening abdominal pain not relieved by oral pain medications, heavy persistent vaginal bleeding, chest pain, palpitations, shortness of breath, dizziness, depressed mood, or for any other major concern.  You may use over the counter ibuprofen (600 mg every 6 hours) and tylenol (500-650 mg every 6 hours) as needed for pain.  You may also use stool softener (Colace/Docusate 100 mg 1-2 tabs per day) as needed for constipation.  These are safe with breastfeeding.    Please call the office to schedule a blood pressure check/initial postpartum exam in 1 week, and a routine postpartum examination in 6 weeks, or sooner if instructed so by your physician.  If you had gestational diabetes during pregnancy, then you must also schedule a 2 hour glucose test during your postpartum examination.        Postpartum Vaginal Delivery Instructions    Activity     Ask family and friends for help when you need it.  Do not place anything in your vagina for 6 weeks.  You are not restricted on other activities, but take it easy for a few weeks to allow your body to recover from delivery.  You are able to do any activities you feel up to that point.  No driving until you have stopped taking your pain medications (usually two weeks after delivery).     Call your health care provider if you have any of these symptoms:     Increased pain, swelling, redness, or fluid around your stiches from an episiotomy or perineal tear.  A fever above 100.4 F (38 C) with or without chills when placing a thermometer under your tongue.  You soak a sanitary pad with blood within 1 hour, or you see blood clots larger than a golf ball.  Bleeding that lasts more than 6 weeks.  Vaginal discharge that smells bad.  Severe pain, cramping or tenderness in your lower belly area.  A need to urinate  more frequently (use the toilet more often), more urgently (use the toilet very quickly), or it burns when you urinate.  Nausea and vomiting.  Redness, swelling or pain around a vein in your leg.  Problems breastfeeding or a red or painful area on your breast.  Chest pain and cough or are gasping for air.  Problems coping with sadness, anxiety, or depression.  If you have any concerns about hurting yourself or the baby, call your provider immediately.   You have questions or concerns after you return home.     Keep your hands clean:  Always wash your hands before touching your perineal area and stitches.  This helps reduce your risk of infection.  If your hands aren't dirty, you may use an alcohol hand-rub to clean your hands. Keep your nails clean and short.        Gestational Hypertension  What is gestational hypertension?  Gestational hypertension is high blood pressure in pregnancy. It occurs in about 3 in 50 pregnancies.    This condition is different from chronic hypertension. Chronic hypertension happens when a woman has high blood pressure before she gets pregnant. It s also different from preeclampsia and eclampsia. These are other blood pressure problems in pregnancy.     Gestational hypertension often starts in the second half of pregnancy. It normally goes away after your baby is born.    What causes gestational hypertension?  Healthcare providers don't know what causes this condition. The following things may increase your risk:   Having high blood pressure before pregnancy or with a past pregnancy  Having kidney disease  Having diabetes  Being younger than 20 years of age or older than 40 years of age  Being pregnant with multiples, such as twins or triplets  Being   What are the symptoms of gestational hypertension?  Symptoms can occur a bit differently in each pregnancy.   The main symptom is high blood pressure in the second half of pregnancy. But some women don t have any symptoms.    High blood pressure in pregnancy can lead to other serious issues. These can include preeclampsia. You should watch for signs of high blood pressure. They can include:   Headache that doesn t go away  Edema (swelling)  Sudden weight gain  Vision changes, such as blurred or double vision  Nausea or vomiting  Pain in the upper right side of your belly, or pain around your stomach  Making small amounts of urine  How is gestational hypertension diagnosed?  If your blood pressure increases, your healthcare provider may diagnose you with this condition. You may also have the following tests to check for this issue:   Blood pressure readings  Urine testing to check for protein, which is a sign that your kidneys aren t working well  Checking for swelling  Checking your weight more often  Liver and kidney function tests  Blood clotting tests  How is gestational hypertension treated?  Blood pressure monitoring  Your healthcare provider may check your blood pressure more often. You should also tell your healthcare provider if you have any new symptoms.       What are possible complications of gestational hypertension?   High blood pressure can affect your blood vessels. It may decrease blood flow in your liver, kidneys, brain, uterus, and placenta.      Can gestational hypertension be prevented?  Having this issue diagnosed and treated early may help reduce your risk for complications. That's why it s important to go to your prenatal checkups. Doing so may keep your condition from getting worse.   When should I call my healthcare provider?  Call your healthcare provider right away if you have signs of high blood pressure. Symptoms can include a headache that doesn t go away, blurred or double vision, swelling, or making less urine than normal.     Key points about gestational hypertension  Gestational hypertension is a form of high blood pressure in pregnancy. It occurs in about 3 in 50 pregnancies.  This condition can  affect the health of both the mother and the baby, depending on how severe the issue is.  Call your healthcare provider right away if you have signs of high blood pressure. Symptoms can include a headache that doesn t go away, blurred or double vision, swelling, or making less urine than normal.    The goal of treatment is to prevent the condition from getting worse and causing other problems.    Next steps  Tips to help you get the most from a visit to your healthcare provider:   Know the reason for your visit and what you want to happen.  Before your visit, write down questions you want answered.  Bring someone with you to help you ask questions and remember what your provider tells you.  At the visit, write down the name of a new diagnosis and any new medicines, treatments, or tests. Also write down any new instructions your provider gives you.  Know why a new medicine or treatment is prescribed and how it will help you. Also know what the side effects are.  Ask if your condition can be treated in other ways.  Know why a test or procedure is recommended and what the results could mean.  Know what to expect if you do not take the medicine or have the test or procedure.  If you have a follow-up appointment, write down the date, time, and purpose for that visit.  Know how you can contact your provider if you have questions.  [employer] last reviewed this educational content on 12/1/2020 2000-2021 [employer]. All rights reserved. This information is not intended as a substitute for professional medical care. Always follow your healthcare professional's instructions.

## 2022-08-27 NOTE — PLAN OF CARE
VSS BP readings reviewed by MD and patient will be able to discharge to home if remains stable . To follow up in clinic in one week for BP check . Up independently in room. BC completed . EPDS score 0 . Spouse present this afternoon and supportive .

## 2022-08-27 NOTE — PLAN OF CARE
Data: Vital signs stable, assessments within normal limits.   Discharge outcomes on care plan met.   No apparent pain.  Action: Review of care plan, teaching, and discharge instructions done with pt.   Response: Pt  states understanding .  All questions  addressed. Pt  discharged with baby  at 1805

## 2022-08-27 NOTE — PLAN OF CARE
Data: Vital signs within normal limits. Postpartum checks within normal limits - see flow record. Patient eating and drinking normally. Patient able to empty bladder independently and is up ambulating. No apparent signs of infection. Patient performing self cares and is able to care for infant.  Action: Patient medicated during the shift for pain and cramping. See MAR. Patient reassessed within 1 hour after each medication and pain was improved - patient stated she was comfortable. See flow record.  Response: Positive attachment behaviors observed with infant. Support persons spouse and mother were present but left overnight.

## 2022-08-27 NOTE — PROGRESS NOTES
"August 27, 2022      SUBJECTIVE: No acute overnight events.  Mild abdominal cramping. +Lochia moderate.  Tolerating PO. Ambulating/urinating w/o difficulty.  Denies CP/palp/SOB/LH.    OBJECTIVE:  /75 (BP Location: Right arm)   Pulse 79   Temp 97.5  F (36.4  C) (Oral)   Resp 16   Ht 1.626 m (5' 4\")   Wt 83.5 kg (184 lb)   LMP 12/09/2021   BMI 31.58 kg/m    Gen: NAD, A&O x 3  Abd: Uterus firm, contracted, NT  Ext: mild edema BL LEs, symmetric, no CT    Hemoglobin   Date Value Ref Range Status   08/25/2022 9.5 (L) 11.7 - 15.7 g/dL Final   08/13/2022 9.8 (L) 11.7 - 15.7 g/dL Final   03/25/2021 11.2 (L) 11.7 - 15.7 g/dL Final   03/24/2021 11.3 (L) 11.7 - 15.7 g/dL Final   ]    A/P: PPD#1 s/p normal vaginal delivery.  Doing well.  Afebrile, VSS.  - ibuprofen, tylenol prn pain  - breastfeeding  - regular diet as tolerated  - routine postpartum care  - possible home later today if remains stable and baby cleared for discharge      SHOSHANA HUMPHRIES MD    "

## 2022-08-27 NOTE — ANESTHESIA POSTPROCEDURE EVALUATION
Patient: Christina Orosco    Procedure: * No procedures listed *       Anesthesia Type:  Epidural    Note:  Disposition: Inpatient   Postop Pain Control: Uneventful            Sign Out: Well controlled pain   PONV: No   Neuro/Psych: Uneventful            Sign Out: Acceptable/Baseline neuro status   Airway/Respiratory: Uneventful            Sign Out: Acceptable/Baseline resp. status   CV/Hemodynamics: Uneventful            Sign Out: Acceptable CV status; No obvious hypovolemia; No obvious fluid overload   Other NRE: NONE   DID A NON-ROUTINE EVENT OCCUR? No    Event details/Postop Comments:  S/P epidural for labor. I or my partner remained immediately available, monitored the patient, and supervised nursing staff at key events and necessary intervals.    The patient is doing well. VSS Temp normal. Satisfactory cardiovascular and repiratory function. Neuro at baseline. Denies positional headache. Minimal side effects easily managed w/ PRN meds. No apparent anesthetic complications. No follow-up required.    [email or web address]           Last vitals:  Vitals:    08/26/22 1845 08/26/22 2310 08/27/22 0248   BP: 124/71 125/75 119/78   Pulse:  75 83   Resp:  18 16   Temp:  98.1  F (36.7  C) 98  F (36.7  C)       Electronically Signed By: Terrance Rosen MD  August 27, 2022  7:24 AM

## 2022-08-27 NOTE — L&D DELIVERY NOTE
Delivery Date: 2022    DIAGNOSES:  A 37-week gestation, gestational hypertension.    PROCEDURES:  Induction of labor, normal spontaneous vaginal delivery.    ANESTHESIA:  Epidural.    QUANTITATIVE BLOOD LOSS:  50 mL.    FINDINGS:  Liveborn female infant, weight was 6 pounds 13 ounces.  Apgars were 8 and 9 at 1 and 5 minutes respectively.  There were no lacerations.    HISTORY:  The patient is a 31-year-old  2, para 1 who presents at 37 weeks' gestation with a history of gestational hypertension diagnosed during this pregnancy.  She had received betamethasone earlier in the pregnancy.  She was also noted to have gestational thrombocytopenia.  She was admitted to Labor and Delivery for induction of labor.  She received oral Cytotec for cervical ripening.  Estimated fetal weight was 7 pounds.  Nonstress test was reactive on presentation.  Group B strep was noted to be negative.  Her platelet count was 122,000.  She received Cytotec overnight and in the morning was found to be 1.5 cm dilated, 60% effaced at the -2 station.  Amniotomy was performed with clear fluid.  She made steady progress through labor receiving an epidural for pain control.  She made rapid progress through active labor to become complete.    DESCRIPTION OF PROCEDURE:  The patient pushed effectively over 10 minutes.  The fetal vertex delivered in the left occiput anterior presentation.  There was a double loose nuchal cord that was reduced on the perineum.  The anterior shoulder delivered easily and the remainder of the body followed spontaneously.  The infant was placed on the patient's abdomen, where she was immediately vigorous and crying.  Cord clamping was delayed by 1 minute, at which time the cord was doubly clamped and cut by the father of the baby.  The placenta delivered spontaneously and was found to be intact with a 3-vessel cord.  The cervix, vagina and perineum were inspected and noted to be intact.  The patient tolerated  the procedure without difficulty and she remained in her delivery room in stable condition.  Sponge, lap and needle counts were correct.    Laly Enciso MD        D: 2022   T: 2022   MT: PIYUSH    Name:     MAYDA MADISON  MRN:      [id number]        Account:       [id number]   :      1990           Delivery Date: 2022     Document: [id number]

## 2022-09-26 ENCOUNTER — MEDICAL CORRESPONDENCE (OUTPATIENT)
Dept: HEALTH INFORMATION MANAGEMENT | Facility: CLINIC | Age: 32
End: 2022-09-26

## 2022-10-15 ENCOUNTER — HEALTH MAINTENANCE LETTER (OUTPATIENT)
Age: 32
End: 2022-10-15

## 2022-11-23 ENCOUNTER — MEDICAL CORRESPONDENCE (OUTPATIENT)
Dept: HEALTH INFORMATION MANAGEMENT | Facility: CLINIC | Age: 32
End: 2022-11-23

## 2023-03-26 ENCOUNTER — HEALTH MAINTENANCE LETTER (OUTPATIENT)
Age: 33
End: 2023-03-26

## 2023-07-25 ENCOUNTER — E-VISIT (OUTPATIENT)
Dept: PEDIATRICS | Facility: CLINIC | Age: 33
End: 2023-07-25
Payer: COMMERCIAL

## 2023-07-25 DIAGNOSIS — H10.9 BACTERIAL CONJUNCTIVITIS OF RIGHT EYE: Primary | ICD-10-CM

## 2023-07-25 PROCEDURE — 99421 OL DIG E/M SVC 5-10 MIN: CPT | Mod: 93 | Performed by: NURSE PRACTITIONER

## 2023-07-25 RX ORDER — OFLOXACIN 3 MG/ML
1-2 SOLUTION/ DROPS OPHTHALMIC 4 TIMES DAILY
Qty: 3 ML | Refills: 0 | Status: SHIPPED | OUTPATIENT
Start: 2023-07-25 | End: 2023-08-01

## 2023-07-25 NOTE — PATIENT INSTRUCTIONS
Thank you for choosing us for your care. I have placed an order for a prescription so that you can start treatment. View your full visit summary for details by clicking on the link below. Your pharmacist will able to address any questions you may have about the medication.     If you re not feeling better within 2-3 days, please schedule an appointment.  You can schedule an appointment right here in [facility], or call [phone]  If the visit is for the same symptoms as your eVisit, we ll refund the cost of your eVisit if seen within seven days.      Bacterial Conjunctivitis    You have an infection in the membranes covering the white part of the eye. This part of the eye is called the conjunctiva. The infection is called conjunctivitis. The most common symptoms of conjunctivitis include a thick, pus-like discharge from the eye, swollen eyelids, redness, eyelids sticking together upon awakening, and a gritty or scratchy feeling in the eye. Your infection was caused by bacteria. It may be treated with medicine. With treatment, the infection takes about 7 to 10 days to resolve.   Home care  Use prescribed antibiotic eye drops or ointment as directed to treat the infection.  Apply a warm compress (towel soaked in warm water) to the affected eye 3 to 4 times a day. Do this just before applying medicine to the eye.  Use a warm, wet cloth to wipe away crusting of the eyelids in the morning. This is caused by mucus drainage during the night. You may also use saline irrigating solution or artificial tears to rinse away mucus in the eye. Do not put a patch over the eye.  Wash your hands before and after touching the infected eye. This is to prevent spreading the infection to the other eye, and to other people. Don't share your towels or washcloths with others.  You may use acetaminophen or ibuprofen to control pain, unless another medicine was prescribed. Talk with your healthcare provider before using these medicines if you  have chronic liver or kidney disease. Also talk with your provider if you have ever had a stomach ulcer or digestive bleeding.  Don't wear contact lenses until your eyes have healed and all symptoms are gone.    Follow-up care  Follow up with your healthcare provider, or as advised.  When to seek medical advice  Call your healthcare provider right away if any of these occur:  Worsening vision  Increasing pain in the eye  Increasing swelling or redness of the eyelid  Redness spreading around the eye  [employer] last reviewed this educational content on 4/1/2020 2000-2022 [employer]. All rights reserved. This information is not intended as a substitute for professional medical care. Always follow your healthcare professional's instructions.

## 2024-05-26 ENCOUNTER — HEALTH MAINTENANCE LETTER (OUTPATIENT)
Age: 34
End: 2024-05-26

## 2025-06-14 ENCOUNTER — HEALTH MAINTENANCE LETTER (OUTPATIENT)
Age: 35
End: 2025-06-14

## (undated) DEVICE — ENDO TROCAR SLEEVE KII Z-THREADED 05X100MM CTS02

## (undated) DEVICE — GLOVE PROTEXIS W/NEU-THERA 6.5  2D73TE65

## (undated) DEVICE — ENDO TROCAR FIRST ENTRY KII FIOS Z-THRD 12X100MM CTF73

## (undated) DEVICE — SUCTION IRR STRYKERFLOW II W/TIP 250-070-520

## (undated) DEVICE — PACK LAP CHOLE SLC15LCFSD

## (undated) DEVICE — DEVICE SUTURE GRASPER TROCAR CLOSURE 14GA PMITCSG

## (undated) DEVICE — SU MONOCRYL 4-0 PS-2 18" UND Y496G

## (undated) DEVICE — LINEN TOWEL PACK X5 5464

## (undated) DEVICE — Device

## (undated) DEVICE — RAD RX ISOVUE 300 (50ML) 61% IOPAMIDOL CHARGE PER ML

## (undated) DEVICE — CLIP APPLIER ENDO ROTATING 10MM MED/LG ER320

## (undated) DEVICE — ENDO POUCH UNIV RETRIEVAL SYSTEM INZII 10MM CD001

## (undated) DEVICE — SOL WATER IRRIG 1000ML BOTTLE 2F7114

## (undated) DEVICE — DRSG GAUZE 4X4" 3033

## (undated) DEVICE — ESU GROUND PAD ADULT W/CORD E7507

## (undated) DEVICE — SU VICRYL 0 UR-6 27" J603H

## (undated) DEVICE — ENDO TROCAR FIRST ENTRY KII FIOS Z-THRD 05X100MM CTF03

## (undated) DEVICE — SYSTEM CLEARIFY VISUALIZATION 21-345

## (undated) DEVICE — EVAC SYSTEM CLEAR FLOW SC082500

## (undated) DEVICE — ESU GROUND PAD UNIVERSAL W/O CORD

## (undated) DEVICE — ESU HOLDER LAP INST DISP PURPLE LONG 330MM H-PRO-330

## (undated) DEVICE — STPL ENDO ARTICULATING 45MM EC45A

## (undated) DEVICE — SOL NACL 0.9% INJ 1000ML BAG 2B1324X

## (undated) DEVICE — ADH SKIN CLOSURE PREMIERPRO EXOFIN 1.0ML 3470

## (undated) DEVICE — SYR 30ML LL W/O NDL 302832

## (undated) DEVICE — SOL NACL 0.9% IRRIG 1000ML BOTTLE 2F7124

## (undated) DEVICE — STPL RELOAD REG TISSUE ECHELON 45 X 3.6MM BLUE GST45B

## (undated) DEVICE — PREP CHLORAPREP 26ML TINTED ORANGE  260815

## (undated) DEVICE — ESU PENCIL W/SMOKE EVAC CVPLP2000

## (undated) DEVICE — NDL CANNULA INTERLINK BLUNT 18GA

## (undated) DEVICE — ENDO TROCAR FIRST ENTRY KII FIOS Z-THRD 11X100MM CTF33

## (undated) RX ORDER — DEXAMETHASONE SODIUM PHOSPHATE 4 MG/ML
INJECTION, SOLUTION INTRA-ARTICULAR; INTRALESIONAL; INTRAMUSCULAR; INTRAVENOUS; SOFT TISSUE
Status: DISPENSED
Start: 2021-03-23

## (undated) RX ORDER — FENTANYL CITRATE 50 UG/ML
INJECTION, SOLUTION INTRAMUSCULAR; INTRAVENOUS
Status: DISPENSED
Start: 2021-03-24

## (undated) RX ORDER — PROPOFOL 10 MG/ML
INJECTION, EMULSION INTRAVENOUS
Status: DISPENSED
Start: 2021-03-24

## (undated) RX ORDER — HYDROMORPHONE HYDROCHLORIDE 1 MG/ML
INJECTION, SOLUTION INTRAMUSCULAR; INTRAVENOUS; SUBCUTANEOUS
Status: DISPENSED
Start: 2021-03-24

## (undated) RX ORDER — LIDOCAINE HYDROCHLORIDE 20 MG/ML
INJECTION, SOLUTION EPIDURAL; INFILTRATION; INTRACAUDAL; PERINEURAL
Status: DISPENSED
Start: 2021-03-23

## (undated) RX ORDER — ERTAPENEM 1 G/1
INJECTION, POWDER, LYOPHILIZED, FOR SOLUTION INTRAMUSCULAR; INTRAVENOUS
Status: DISPENSED
Start: 2021-03-23

## (undated) RX ORDER — FENTANYL CITRATE 50 UG/ML
INJECTION, SOLUTION INTRAMUSCULAR; INTRAVENOUS
Status: DISPENSED
Start: 2021-03-23

## (undated) RX ORDER — DEXAMETHASONE SODIUM PHOSPHATE 4 MG/ML
INJECTION, SOLUTION INTRA-ARTICULAR; INTRALESIONAL; INTRAMUSCULAR; INTRAVENOUS; SOFT TISSUE
Status: DISPENSED
Start: 2021-03-24

## (undated) RX ORDER — LIDOCAINE HYDROCHLORIDE 20 MG/ML
INJECTION, SOLUTION EPIDURAL; INFILTRATION; INTRACAUDAL; PERINEURAL
Status: DISPENSED
Start: 2021-03-24

## (undated) RX ORDER — ONDANSETRON 2 MG/ML
INJECTION INTRAMUSCULAR; INTRAVENOUS
Status: DISPENSED
Start: 2021-03-24

## (undated) RX ORDER — ALBUMIN, HUMAN INJ 5% 5 %
SOLUTION INTRAVENOUS
Status: DISPENSED
Start: 2021-03-24

## (undated) RX ORDER — LIDOCAINE HYDROCHLORIDE 10 MG/ML
INJECTION, SOLUTION INFILTRATION; PERINEURAL
Status: DISPENSED
Start: 2021-03-24

## (undated) RX ORDER — BUPIVACAINE HYDROCHLORIDE AND EPINEPHRINE 5; 5 MG/ML; UG/ML
INJECTION, SOLUTION EPIDURAL; INTRACAUDAL; PERINEURAL
Status: DISPENSED
Start: 2021-03-24

## (undated) RX ORDER — PROPOFOL 10 MG/ML
INJECTION, EMULSION INTRAVENOUS
Status: DISPENSED
Start: 2021-03-23

## (undated) RX ORDER — ONDANSETRON 2 MG/ML
INJECTION INTRAMUSCULAR; INTRAVENOUS
Status: DISPENSED
Start: 2021-03-23

## (undated) RX ORDER — DIPHENHYDRAMINE HYDROCHLORIDE 50 MG/ML
INJECTION INTRAMUSCULAR; INTRAVENOUS
Status: DISPENSED
Start: 2021-03-24